# Patient Record
Sex: FEMALE | Race: WHITE | NOT HISPANIC OR LATINO | Employment: OTHER | URBAN - METROPOLITAN AREA
[De-identification: names, ages, dates, MRNs, and addresses within clinical notes are randomized per-mention and may not be internally consistent; named-entity substitution may affect disease eponyms.]

---

## 2017-01-03 ENCOUNTER — HOSPITAL ENCOUNTER (OUTPATIENT)
Dept: LAB | Facility: MEDICAL CENTER | Age: 82
End: 2017-01-03
Attending: FAMILY MEDICINE
Payer: MEDICARE

## 2017-01-03 DIAGNOSIS — E78.49 OTHER HYPERLIPIDEMIA: ICD-10-CM

## 2017-01-03 DIAGNOSIS — E03.8 SUBCLINICAL HYPOTHYROIDISM: ICD-10-CM

## 2017-01-03 LAB
CHOLEST SERPL-MCNC: 180 MG/DL (ref 100–199)
HDLC SERPL-MCNC: 66 MG/DL
LDLC SERPL CALC-MCNC: 99 MG/DL
T4 FREE SERPL-MCNC: 0.99 NG/DL (ref 0.53–1.43)
TRIGL SERPL-MCNC: 76 MG/DL (ref 0–149)
TSH SERPL DL<=0.005 MIU/L-ACNC: 4.8 UIU/ML (ref 0.3–3.7)

## 2017-01-03 PROCEDURE — 80061 LIPID PANEL: CPT

## 2017-01-03 PROCEDURE — 84439 ASSAY OF FREE THYROXINE: CPT

## 2017-01-03 PROCEDURE — 36415 COLL VENOUS BLD VENIPUNCTURE: CPT

## 2017-01-03 PROCEDURE — 84443 ASSAY THYROID STIM HORMONE: CPT

## 2017-01-05 ENCOUNTER — TELEPHONE (OUTPATIENT)
Dept: MEDICAL GROUP | Facility: PHYSICIAN GROUP | Age: 82
End: 2017-01-05

## 2017-01-05 NOTE — TELEPHONE ENCOUNTER
----- Message from Ayesha Andujar D.O. sent at 1/5/2017  1:01 PM PST -----  Please have patient schedule an appointment to go over results.  Thank you,  Dr. Andujar

## 2017-01-13 ENCOUNTER — OFFICE VISIT (OUTPATIENT)
Dept: MEDICAL GROUP | Facility: PHYSICIAN GROUP | Age: 82
End: 2017-01-13
Payer: MEDICARE

## 2017-01-13 VITALS
BODY MASS INDEX: 30.73 KG/M2 | RESPIRATION RATE: 16 BRPM | WEIGHT: 167 LBS | HEIGHT: 62 IN | TEMPERATURE: 97.5 F | HEART RATE: 82 BPM | SYSTOLIC BLOOD PRESSURE: 150 MMHG | OXYGEN SATURATION: 92 % | DIASTOLIC BLOOD PRESSURE: 68 MMHG

## 2017-01-13 DIAGNOSIS — E03.9 HYPOTHYROIDISM, UNSPECIFIED TYPE: ICD-10-CM

## 2017-01-13 PROCEDURE — 99213 OFFICE O/P EST LOW 20 MIN: CPT | Performed by: FAMILY MEDICINE

## 2017-01-13 RX ORDER — LEVOTHYROXINE SODIUM 0.03 MG/1
25 TABLET ORAL
Qty: 30 TAB | Refills: 1 | Status: SHIPPED | OUTPATIENT
Start: 2017-01-13 | End: 2017-03-02 | Stop reason: SDUPTHER

## 2017-01-13 NOTE — PROGRESS NOTES
Subjective:     Chief Complaint   Patient presents with   • Results       Corrinne Alice Carmignani is a 81 y.o. female here today for follow-up abnormal TSH and labs.  Patient reports fatigue that is nonspecific. Patient denies any heat intolerance, cold intolerance, weight changes, hair loss, other factors significant for thyroid disease. On review of patient's chart TSH has been elevated for one year. Patient reports history of hypothyroidism many years prior for which she was on thyroid medication. Pt is interested in treatment as she does have a symptom of fatigue.      No Known Allergies  Current medicines (including changes today)  Current Outpatient Prescriptions   Medication Sig Dispense Refill   • levothyroxine (SYNTHROID) 25 MCG Tab Take 1 Tab by mouth Every morning on an empty stomach. 30 Tab 1   • anastrozole (ARIMIDEX) 1 MG Tab RX by oncologist 30 Tab 0   • simvastatin (ZOCOR) 20 MG Tab TAKE 1 TABLET EVERY EVENINGPLEASE MAKE AN APPOINTMENT WITH YOUR DOCTOR 90 Tab 1   • vitamin D (CHOLECALCIFEROL) 1000 UNIT Tab Take 1,000 Units by mouth every evening.     • ascorbic acid (ASCORBIC ACID) 500 MG Tab Take 500 mg by mouth every day.     • Multiple Vitamin (MULTI-VITAMIN DAILY PO) Take  by mouth.     • Cyanocobalamin (VITAMIN B-12) 1000 MCG TABS Take 1,000 mcg by mouth every evening.       No current facility-administered medications for this visit.     Social History   Substance Use Topics   • Smoking status: Former Smoker -- 0.50 packs/day for 45 years     Types: Cigarettes     Quit date: 1998   • Smokeless tobacco: Never Used      Comment: avoid all tobacco products   • Alcohol Use: 0.0 oz/week     0 Standard drinks or equivalent per week      Comment: 7/year     Family Status   Relation Status Death Age   • Mother     • Father       Family History   Problem Relation Age of Onset   • Heart Disease Mother    • Other Father      She    has a past medical history of Hyperlipidemia; Back  "pain; Hypothyroid; Cancer (CMS-HCC); Lumbar spondylosis; Arthritis; Dental disorder; Snoring; and Cataract.        ROS   GEN: no weight loss, fevers, or chills  HEENT: no blurry vision or change in vision  Resp: no shortness of breath, no cough  CV: no racing heart, no irregular beats, no chest pain  Abd: no nausea, no vomiting, no diarrhea, no constipation, no blood in stool, no dark stools, no incontinence  MSK: no muscle aches, no joint pain, no limited motion         Objective:     Blood pressure 150/68, pulse 82, temperature 36.4 °C (97.5 °F), resp. rate 16, height 1.575 m (5' 2.01\"), weight 75.751 kg (167 lb), SpO2 92 %. Body mass index is 30.54 kg/(m^2).   Physical Exam:  Constitutional: Alert, no distress.  Skin: Warm, dry, good turgor, no rashes in visible areas.  Eye: Equal, round and reactive, conjunctiva clear, lids normal.  ENMT: Lips without lesions, good dentition, oropharynx non-erythematous, no exudate, moist oral mucosa  Neck: Trachea midline, no masses, no thyromegaly. No cervical or supraclavicular lymphadenopathy. Full ROM  Respiratory: Unlabored respiratory effort, good air movement, lungs clear to auscultation, no wheezes, no ronchi.  Cardiovascular:RRR, +S1, S2, no murmur, no peripheral edema, pedal and radial pulses equal and intact bilat  Abdomen: Soft, non-tender, no masses, no hepatosplenomegaly.  MSK:5/5 muscle strength in upper extremities as well as lower extremity bilaterally  Psych: Alert and oriented x3, appropriate affect and mood.  Neuro: CN2-12 intact, no gross motor or sensory deficits      Assessment and Plan:   The following treatment plan was discussed    1. Hypothyroidism, unspecified type  New problem: Risk and benefits of treatment extensively discussed with patient. Patient is interested in treatment at this time. Advised patient to take medicine each morning on empty stomach with a full glass of water one hour before food. Patient understands the need for compliance with " this medication. Advised patient to have recheck of labs in 6 weeks for resolution. Signs and symptoms of hyper and hypothyroidism discussed.  - levothyroxine (SYNTHROID) 25 MCG Tab; Take 1 Tab by mouth Every morning on an empty stomach.  Dispense: 30 Tab; Refill: 1  - TSH WITH REFLEX TO FT4; Future      Followup: No Follow-up on file.    Please note that this dictation was created using voice recognition software. I have made every reasonable attempt to correct obvious errors, but I expect that there are errors of grammar and possibly content that I did not discover before finalizing the note.

## 2017-01-13 NOTE — MR AVS SNAPSHOT
"        Corrinne Alice Carmignnaveed   2017 2:00 PM   Office Visit   MRN: [id number]    Department:  [facility]   Dept Phone:  [phone]    Description:  Female : 1935   Provider:  Ayesha Andujar D.O.           Reason for Visit     Results           Allergies as of 2017     No Known Allergies      You were diagnosed with     Hypothyroidism, unspecified type   [[id number]]         Vital Signs     Blood Pressure Pulse Temperature Respirations Height Weight    150/68 mmHg 82 36.4 °C (97.5 °F) 16 1.575 m (5' 2.01\") 75.751 kg (167 lb)    Body Mass Index Oxygen Saturation Smoking Status             30.54 kg/m2 92% Former Smoker         Basic Information     Date Of Birth Sex Race Ethnicity Preferred Language    1935 Female White Non- English      Problem List              ICD-10-CM Priority Class Noted - Resolved    Hyperlipidemia E78.5 Low  2010 - Present    Hx of Nonspecific abnormal electrocardiogram (ECG) (EKG) R94.31 Medium  6/3/2014 - Present    Malignant neoplasm of right breast (CMS-HCC) C50.911   2016 - Present    Vitamin D deficiency E55.9   2016 - Present    Subclinical hypothyroidism E03.9   2016 - Present      Health Maintenance        Date Due Completion Dates    IMM INFLUENZA (1) 2016 ---    MAMMOGRAM 2017, 2016, 2016, 2015, 2015, 2014, 2014, 2014, 2013, 2013, 2012, 2011, 2010, 2010, 3/23/2009, 3/23/2009, 2008, 2008    IMM PNEUMOCOCCAL 65+ (ADULT) LOW/MEDIUM RISK SERIES (2 of 2 - PPSV23) 2017    COLONOSCOPY 10/2/2018 10/2/2013    BONE DENSITY 2021, 2013    IMM DTaP/Tdap/Td Vaccine (2 - Td) 2023            Current Immunizations     13-VALENT PCV PREVNAR 2016    SHINGLES VACCINE 2016    Tdap Vaccine 2013  8:25 AM      Below and/or attached are the medications your provider expects you to take. Review all of " your home medications and newly ordered medications with your provider and/or pharmacist. Follow medication instructions as directed by your provider and/or pharmacist. Please keep your medication list with you and share with your provider. Update the information when medications are discontinued, doses are changed, or new medications (including over-the-counter products) are added; and carry medication information at all times in the event of emergency situations     Allergies:  No Known Allergies          Medications  Valid as of: January 13, 2017 -  3:57 PM    Generic Name Brand Name Tablet Size Instructions for use    Anastrozole (Tab) ARIMIDEX 1 MG RX by oncologist        Ascorbic Acid (Tab) ascorbic acid 500 MG Take 500 mg by mouth every day.        Cholecalciferol (Tab) cholecalciferol 1000 UNIT Take 1,000 Units by mouth every evening.        Cyanocobalamin (Tab) vitamin B-12 1000 MCG Take 1,000 mcg by mouth every evening.        Levothyroxine Sodium (Tab) SYNTHROID 25 MCG Take 1 Tab by mouth Every morning on an empty stomach.        Multiple Vitamin   Take  by mouth.        Simvastatin (Tab) ZOCOR 20 MG TAKE 1 TABLET EVERY EVENINGPLEASE MAKE AN APPOINTMENT WITH YOUR DOCTOR        .                 Medicines prescribed today were sent to:     [facility] - [place] - [address]    [address] [place] [address]    Phone: [phone] Fax: [phone]    Open 24 Hours?: No    [facility] [place] [place] - [address] AT PORTAL TO REGISTERED [facility] SITES    [address] [facility] [address]    Phone: [phone] Fax: [phone]    Open 24 Hours?: No      Medication refill instructions:       If your prescription bottle indicates you have medication refills left, it is not necessary to call your provider’s office. Please contact your pharmacy and they will refill your medication.    If your prescription bottle indicates you do not have any refills  left, you may request refills at any time through one of the following ways: The online [employer] system (except Urgent Care), by calling your provider’s office, or by asking your pharmacy to contact your provider’s office with a refill request. Medication refills are processed only during regular business hours and may not be available until the next business day. Your provider may request additional information or to have a follow-up visit with you prior to refilling your medication.   *Please Note: Medication refills are assigned a new Rx number when refilled electronically. Your pharmacy may indicate that no refills were authorized even though a new prescription for the same medication is available at the pharmacy. Please request the medicine by name with the pharmacy before contacting your provider for a refill.        Your To Do List     Future Labs/Procedures Complete By Expires    TSH WITH REFLEX TO FT4  As directed 1/13/2018         [employer] Access Code: Activation code not generated  Current [employer] Status: Active

## 2017-01-21 ENCOUNTER — OFFICE VISIT (OUTPATIENT)
Dept: URGENT CARE | Facility: PHYSICIAN GROUP | Age: 82
End: 2017-01-21
Payer: MEDICARE

## 2017-01-21 ENCOUNTER — HOSPITAL ENCOUNTER (OUTPATIENT)
Facility: MEDICAL CENTER | Age: 82
End: 2017-01-21
Attending: FAMILY MEDICINE
Payer: MEDICARE

## 2017-01-21 VITALS
BODY MASS INDEX: 31.21 KG/M2 | TEMPERATURE: 98.1 F | HEIGHT: 62 IN | RESPIRATION RATE: 16 BRPM | SYSTOLIC BLOOD PRESSURE: 138 MMHG | OXYGEN SATURATION: 94 % | HEART RATE: 92 BPM | WEIGHT: 169.6 LBS | DIASTOLIC BLOOD PRESSURE: 70 MMHG

## 2017-01-21 DIAGNOSIS — N39.0 ACUTE UTI: ICD-10-CM

## 2017-01-21 LAB
APPEARANCE UR: NORMAL
BILIRUB UR STRIP-MCNC: NORMAL MG/DL
COLOR UR AUTO: YELLOW
GLUCOSE UR STRIP.AUTO-MCNC: NORMAL MG/DL
KETONES UR STRIP.AUTO-MCNC: NORMAL MG/DL
LEUKOCYTE ESTERASE UR QL STRIP.AUTO: NORMAL
NITRITE UR QL STRIP.AUTO: NORMAL
PH UR STRIP.AUTO: 6 [PH] (ref 5–8)
PROT UR QL STRIP: NORMAL MG/DL
RBC UR QL AUTO: NORMAL
SP GR UR STRIP.AUTO: 1.02
UROBILINOGEN UR STRIP-MCNC: NORMAL MG/DL

## 2017-01-21 PROCEDURE — 87086 URINE CULTURE/COLONY COUNT: CPT

## 2017-01-21 PROCEDURE — 99214 OFFICE O/P EST MOD 30 MIN: CPT | Performed by: FAMILY MEDICINE

## 2017-01-21 PROCEDURE — 99000 SPECIMEN HANDLING OFFICE-LAB: CPT | Performed by: FAMILY MEDICINE

## 2017-01-21 PROCEDURE — 81002 URINALYSIS NONAUTO W/O SCOPE: CPT | Performed by: FAMILY MEDICINE

## 2017-01-21 RX ORDER — CEFUROXIME AXETIL 250 MG/1
250 TABLET ORAL 2 TIMES DAILY
Qty: 14 TAB | Refills: 0 | Status: SHIPPED | OUTPATIENT
Start: 2017-01-21 | End: 2017-01-28

## 2017-01-21 ASSESSMENT — ENCOUNTER SYMPTOMS
MYALGIAS: 0
EYE DISCHARGE: 0
WEIGHT LOSS: 0
HEADACHES: 0
EYE REDNESS: 0

## 2017-01-21 NOTE — MR AVS SNAPSHOT
"        Corrinne Alice Carmignani   2017 12:15 PM   Office Visit   MRN: [id number]    Department:  [facility]   Dept Phone:  [phone]    Description:  Female : 1935   Provider:  Chavez Carlton M.D.           Reason for Visit     UTI discomfort while urinating / frequent urination       Allergies as of 2017     No Known Allergies      You were diagnosed with     Acute UTI   [[id number]]         Vital Signs     Blood Pressure Pulse Temperature Respirations Height Weight    138/70 mmHg 92 36.7 °C (98.1 °F) 16 1.575 m (5' 2.01\") 76.93 kg (169 lb 9.6 oz)    Body Mass Index Oxygen Saturation Smoking Status             31.01 kg/m2 94% Former Smoker         Basic Information     Date Of Birth Sex Race Ethnicity Preferred Language    1935 Female White Non- English      Problem List              ICD-10-CM Priority Class Noted - Resolved    Hyperlipidemia E78.5 Low  2010 - Present    Hx of Nonspecific abnormal electrocardiogram (ECG) (EKG) R94.31 Medium  6/3/2014 - Present    Malignant neoplasm of right breast (CMS-HCC) C50.911   2016 - Present    Vitamin D deficiency E55.9   2016 - Present    Subclinical hypothyroidism E03.9   2016 - Present      Health Maintenance        Date Due Completion Dates    IMM INFLUENZA (1) 2016 ---    MAMMOGRAM 2017, 2015, 2015, 2014, 2014, 2014, 2013, 2013, 2012, 2011, 2010, 2010, 3/23/2009, 3/23/2009, 2008, 2008    IMM PNEUMOCOCCAL 65+ (ADULT) LOW/MEDIUM RISK SERIES (2 of 2 - PPSV23) 2017    COLONOSCOPY 10/2/2018 10/2/2013    BONE DENSITY 2021, 2013    IMM DTaP/Tdap/Td Vaccine (2 - Td) 2023            Current Immunizations     13-VALENT PCV PREVNAR 2016    SHINGLES VACCINE 2016    Tdap Vaccine 2013  8:25 AM      Below and/or attached are the medications your provider expects you to take. Review all of " your home medications and newly ordered medications with your provider and/or pharmacist. Follow medication instructions as directed by your provider and/or pharmacist. Please keep your medication list with you and share with your provider. Update the information when medications are discontinued, doses are changed, or new medications (including over-the-counter products) are added; and carry medication information at all times in the event of emergency situations     Allergies:  No Known Allergies          Medications  Valid as of: January 21, 2017 - 12:36 PM    Generic Name Brand Name Tablet Size Instructions for use    Anastrozole (Tab) ARIMIDEX 1 MG RX by oncologist        Ascorbic Acid (Tab) ascorbic acid 500 MG Take 500 mg by mouth every day.        Cefuroxime Axetil (Tab) CEFTIN 250 MG Take 1 Tab by mouth 2 times a day for 7 days.        Cholecalciferol (Tab) cholecalciferol 1000 UNIT Take 1,000 Units by mouth every evening.        Cyanocobalamin (Tab) vitamin B-12 1000 MCG Take 1,000 mcg by mouth every evening.        Levothyroxine Sodium (Tab) SYNTHROID 25 MCG Take 1 Tab by mouth Every morning on an empty stomach.        Multiple Vitamin   Take  by mouth.        Simvastatin (Tab) ZOCOR 20 MG TAKE 1 TABLET EVERY EVENINGPLEASE MAKE AN APPOINTMENT WITH YOUR DOCTOR        .                 Medicines prescribed today were sent to:     [facility] - [place] - [address] [facility]    [address] [facility] [place] [address]    Phone: [phone] Fax: [phone]    Open 24 Hours?: No    [facility] [place], [place] - [address] AT PORTAL TO [facility] SITES    [address] [facility] [address]    Phone: [phone] Fax: [phone]    Open 24 Hours?: No      Medication refill instructions:       If your prescription bottle indicates you have medication refills left, it is not necessary to call your provider’s office. Please contact your pharmacy and they will  refill your medication.    If your prescription bottle indicates you do not have any refills left, you may request refills at any time through one of the following ways: The online [employer] system (except Urgent Care), by calling your provider’s office, or by asking your pharmacy to contact your provider’s office with a refill request. Medication refills are processed only during regular business hours and may not be available until the next business day. Your provider may request additional information or to have a follow-up visit with you prior to refilling your medication.   *Please Note: Medication refills are assigned a new Rx number when refilled electronically. Your pharmacy may indicate that no refills were authorized even though a new prescription for the same medication is available at the pharmacy. Please request the medicine by name with the pharmacy before contacting your provider for a refill.        Your To Do List     Future Labs/Procedures Complete By Expires    URINE CULTURE(NEW)  As directed 1/21/2018         [employer] Access Code: Activation code not generated  Current [employer] Status: Active

## 2017-01-21 NOTE — PROGRESS NOTES
"Subjective:      Corrinne Alice Carmignani is a 81 y.o. female who presents with UTI            UTI  This is a new problem. Episode onset: 1 month intermittent dysuria. Worse last night. Mild urgency and frequency. No hematuria. No fever No flank pain. No PMH pyelonephritis. No OTC medications.  Pertinent negatives include no headaches, myalgias or rash.   No other aggravating or alleviating factors.      Review of Systems   Constitutional: Positive for malaise/fatigue. Negative for weight loss.   Eyes: Negative for discharge and redness.   Musculoskeletal: Negative for myalgias and joint pain.   Skin: Negative for itching and rash.   Neurological: Negative for headaches.          Objective:     /70 mmHg  Pulse 92  Temp(Src) 36.7 °C (98.1 °F)  Resp 16  Ht 1.575 m (5' 2.01\")  Wt 76.93 kg (169 lb 9.6 oz)  BMI 31.01 kg/m2  SpO2 94%     Physical Exam   Constitutional: She appears well-developed and well-nourished. No distress.   Eyes: Conjunctivae are normal.   Genitourinary:   No cvat, no suprapubic tenderness   Neurological:   Speech is clear. Patient is appropriate and cooperative.     Skin: Skin is warm and dry. No rash noted.               Assessment/Plan:     There are no diagnoses linked to this encounter.      "

## 2017-01-22 DIAGNOSIS — N39.0 ACUTE UTI: ICD-10-CM

## 2017-01-24 LAB
BACTERIA UR CULT: NORMAL
SIGNIFICANT IND 70042: NORMAL
SOURCE SOURCE: NORMAL

## 2017-02-21 RX ORDER — SIMVASTATIN 20 MG
TABLET ORAL
Qty: 90 TAB | Refills: 0 | Status: SHIPPED | OUTPATIENT
Start: 2017-02-21 | End: 2017-04-30 | Stop reason: SDUPTHER

## 2017-02-21 NOTE — TELEPHONE ENCOUNTER
*LAST Rx SIG: REQUESTING FOR DR APPOINTMENT 08/12/16*  Was the patient seen in the last year in this department? Yes     Does patient have an active prescription for medications requested? No     Received Request Via: Pharmacy      Pt met protocol?: Yes    LAST OV 01/13/17    LAST LIPID PROFILE 01/03/17    BP Readings from Last 1 Encounters:   01/21/17 138/70

## 2017-02-21 NOTE — TELEPHONE ENCOUNTER
Last seen by PCP 1/17. Will send 3 months to pharmacy.  Lab Results   Component Value Date/Time    CHOLESTEROL, 01/03/2017 12:02 PM    LDL 99 01/03/2017 12:02 PM    HDL 66 01/03/2017 12:02 PM    TRIGLYCERIDES 76 01/03/2017 12:02 PM       Lab Results   Component Value Date/Time    SODIUM 140 04/29/2016 09:21 AM    POTASSIUM 4.0 04/29/2016 09:21 AM    CHLORIDE 102 04/29/2016 09:21 AM    CO2 31 04/29/2016 09:21 AM    GLUCOSE 96 04/29/2016 09:21 AM    BUN 16 04/29/2016 09:21 AM    CREATININE 0.64 04/29/2016 09:21 AM     Lab Results   Component Value Date/Time    ALKALINE PHOSPHATASE 69 04/29/2016 09:21 AM    AST(SGOT) 17 04/29/2016 09:21 AM    ALT(SGPT) 15 04/29/2016 09:21 AM    TOTAL BILIRUBIN 0.8 04/29/2016 09:21 AM

## 2017-02-24 ENCOUNTER — HOSPITAL ENCOUNTER (OUTPATIENT)
Dept: LAB | Facility: MEDICAL CENTER | Age: 82
End: 2017-02-24
Attending: FAMILY MEDICINE
Payer: MEDICARE

## 2017-02-24 DIAGNOSIS — E03.9 HYPOTHYROIDISM, UNSPECIFIED TYPE: ICD-10-CM

## 2017-02-24 LAB — TSH SERPL DL<=0.005 MIU/L-ACNC: 3.67 UIU/ML (ref 0.3–3.7)

## 2017-02-24 PROCEDURE — 84443 ASSAY THYROID STIM HORMONE: CPT

## 2017-02-24 PROCEDURE — 36415 COLL VENOUS BLD VENIPUNCTURE: CPT

## 2017-03-02 DIAGNOSIS — E03.9 HYPOTHYROIDISM, UNSPECIFIED TYPE: ICD-10-CM

## 2017-03-02 RX ORDER — LEVOTHYROXINE SODIUM 0.03 MG/1
25 TABLET ORAL
Qty: 30 TAB | Refills: 10 | Status: SHIPPED | OUTPATIENT
Start: 2017-03-02 | End: 2017-12-27 | Stop reason: SDUPTHER

## 2017-03-02 NOTE — TELEPHONE ENCOUNTER
Was the patient seen in the last year in this department? Yes     Does patient have an active prescription for medications requested? No     Received Request Via: Patient     Pt states she was suppose to do another blood test to see her TSH levels to see if her medication needs to be adjusted.  Her test results are in and now needs a refill.  She would like to know if she could get a 90 day supply?

## 2017-05-01 RX ORDER — SIMVASTATIN 20 MG
TABLET ORAL
Qty: 90 TAB | Refills: 1 | Status: SHIPPED | OUTPATIENT
Start: 2017-05-01 | End: 2017-06-09 | Stop reason: SDUPTHER

## 2017-05-01 NOTE — TELEPHONE ENCOUNTER
Pt has had OV within the 12 month protocol and lipid panel is current from 1/17. 6 month supply sent to pharmacy.   Lab Results   Component Value Date/Time    CHOLESTEROL, 01/03/2017 12:02 PM    LDL 99 01/03/2017 12:02 PM    HDL 66 01/03/2017 12:02 PM    TRIGLYCERIDES 76 01/03/2017 12:02 PM       Lab Results   Component Value Date/Time    SODIUM 140 04/29/2016 09:21 AM    POTASSIUM 4.0 04/29/2016 09:21 AM    CHLORIDE 102 04/29/2016 09:21 AM    CO2 31 04/29/2016 09:21 AM    GLUCOSE 96 04/29/2016 09:21 AM    BUN 16 04/29/2016 09:21 AM    CREATININE 0.64 04/29/2016 09:21 AM     Lab Results   Component Value Date/Time    ALKALINE PHOSPHATASE 69 04/29/2016 09:21 AM    AST(SGOT) 17 04/29/2016 09:21 AM    ALT(SGPT) 15 04/29/2016 09:21 AM    TOTAL BILIRUBIN 0.8 04/29/2016 09:21 AM

## 2017-05-29 ENCOUNTER — OFFICE VISIT (OUTPATIENT)
Dept: URGENT CARE | Facility: PHYSICIAN GROUP | Age: 82
End: 2017-05-29
Payer: MEDICARE

## 2017-05-29 VITALS
HEIGHT: 61 IN | SYSTOLIC BLOOD PRESSURE: 126 MMHG | RESPIRATION RATE: 20 BRPM | OXYGEN SATURATION: 94 % | DIASTOLIC BLOOD PRESSURE: 62 MMHG | TEMPERATURE: 99.7 F | BODY MASS INDEX: 30.96 KG/M2 | HEART RATE: 80 BPM | WEIGHT: 164 LBS

## 2017-05-29 DIAGNOSIS — J06.9 VIRAL URI WITH COUGH: ICD-10-CM

## 2017-05-29 LAB
INT CON NEG: NEGATIVE
INT CON POS: POSITIVE
S PYO AG THROAT QL: NEGATIVE

## 2017-05-29 PROCEDURE — 99213 OFFICE O/P EST LOW 20 MIN: CPT | Performed by: PHYSICIAN ASSISTANT

## 2017-05-29 PROCEDURE — G8419 CALC BMI OUT NRM PARAM NOF/U: HCPCS | Performed by: PHYSICIAN ASSISTANT

## 2017-05-29 PROCEDURE — G8432 DEP SCR NOT DOC, RNG: HCPCS | Performed by: PHYSICIAN ASSISTANT

## 2017-05-29 PROCEDURE — 4040F PNEUMOC VAC/ADMIN/RCVD: CPT | Performed by: PHYSICIAN ASSISTANT

## 2017-05-29 PROCEDURE — 1101F PT FALLS ASSESS-DOCD LE1/YR: CPT | Performed by: PHYSICIAN ASSISTANT

## 2017-05-29 PROCEDURE — 87880 STREP A ASSAY W/OPTIC: CPT | Performed by: PHYSICIAN ASSISTANT

## 2017-05-29 PROCEDURE — 1036F TOBACCO NON-USER: CPT | Performed by: PHYSICIAN ASSISTANT

## 2017-05-29 RX ORDER — ASPIRIN 81 MG/1
81 TABLET, CHEWABLE ORAL DAILY
COMMUNITY

## 2017-05-29 RX ORDER — FLUTICASONE PROPIONATE 50 MCG
1 SPRAY, SUSPENSION (ML) NASAL 2 TIMES DAILY
Qty: 16 G | Refills: 0 | Status: SHIPPED | OUTPATIENT
Start: 2017-05-29 | End: 2019-05-30

## 2017-05-29 NOTE — MR AVS SNAPSHOT
"        Corrinne Alice Carmignnaveed   2017 9:20 AM   Office Visit   MRN: [id number]    Department:  [facility]   Dept Phone:  [phone]    Description:  Female : 1935   Provider:  Palak Lagos PA-C           Reason for Visit     Sore Throat C/o sore throat, runny nose x2 days.  Took an old penicillin pill from previous encounter.      Allergies as of 2017     No Known Allergies      You were diagnosed with     Viral URI with cough   [[id number]]         Vital Signs     Blood Pressure Pulse Temperature Respirations Height Weight    126/62 mmHg 80 37.6 °C (99.7 °F) 20 1.562 m (5' 1.5\") 74.39 kg (164 lb)    Body Mass Index Oxygen Saturation Smoking Status             30.49 kg/m2 94% Former Smoker         Basic Information     Date Of Birth Sex Race Ethnicity Preferred Language    1935 Female White Non- English      Your appointments     2017  8:00 AM   MA [id number] with [facility] MG 3   [facility] [place] ([address])    [address] [place] [id number]   [phone]           No deodorant, powder, perfume or lotion under the arm or breast area.              Problem List              ICD-10-CM Priority Class Noted - Resolved    Hyperlipidemia E78.5 Low  2010 - Present    Hx of Nonspecific abnormal electrocardiogram (ECG) (EKG) R94.31 Medium  6/3/2014 - Present    Malignant neoplasm of right breast (CMS-HCC) C50.911   2016 - Present    Vitamin D deficiency E55.9   2016 - Present    Subclinical hypothyroidism E03.9   2016 - Present      Health Maintenance        Date Due Completion Dates    MAMMOGRAM 2017, 2015, 2015, 2014, 2014, 2014, 2013, 2013, 2012, 2011, 2010, 2010, 3/23/2009, 3/23/2009, 2008, 2008    IMM PNEUMOCOCCAL 65+ (ADULT) LOW/MEDIUM RISK SERIES (2 of 2 - PPSV23) 2017    COLONOSCOPY 10/2/2018 10/2/2013    BONE DENSITY " 4/12/2021 4/12/2016, 7/11/2013    IMM DTaP/Tdap/Td Vaccine (2 - Td) 7/9/2023 7/9/2013            Results     POCT Rapid Strep A      Component    Rapid Strep Screen    Negative    Internal Control Positive    Positive    Internal Control Negative    Negative                        Current Immunizations     13-VALENT PCV PREVNAR 9/8/2016    SHINGLES VACCINE 9/8/2016    Tdap Vaccine 7/9/2013  8:25 AM      Below and/or attached are the medications your provider expects you to take. Review all of your home medications and newly ordered medications with your provider and/or pharmacist. Follow medication instructions as directed by your provider and/or pharmacist. Please keep your medication list with you and share with your provider. Update the information when medications are discontinued, doses are changed, or new medications (including over-the-counter products) are added; and carry medication information at all times in the event of emergency situations     Allergies:  No Known Allergies          Medications  Valid as of: May 29, 2017 - 10:19 AM    Generic Name Brand Name Tablet Size Instructions for use    Anastrozole (Tab) ARIMIDEX 1 MG RX by oncologist        Ascorbic Acid (Tab) ascorbic acid 500 MG Take 500 mg by mouth every day.        Aspirin (Chew Tab) ASA 81 MG Take 81 mg by mouth every day.        Cholecalciferol (Tab) cholecalciferol 1000 UNIT Take 1,000 Units by mouth every evening.        Cholecalciferol   Take  by mouth.        Cyanocobalamin (Tab) vitamin B-12 1000 MCG Take 1,000 mcg by mouth every evening.        Fluticasone Propionate (Suspension) FLONASE 50 MCG/ACT Spray 1 Spray in nose 2 times a day.        Levothyroxine Sodium (Tab) SYNTHROID 25 MCG Take 1 Tab by mouth Every morning on an empty stomach.        Multiple Vitamin   Take  by mouth.        Simvastatin (Tab) ZOCOR 20 MG TAKE 1 TABLET EVERY  EVENING.        Sodium Fluoride (Paste) Sodium Fluoride 1.1 % by dental route.        .                  Medicines prescribed today were sent to:     [facility] - [place], [place] - [address] [facility]. [address]    [address] [facility]. [address] [place] [address]    Phone: [phone] Fax: [phone]    Open 24 Hours?: No      Medication refill instructions:       If your prescription bottle indicates you have medication refills left, it is not necessary to call your provider’s office. Please contact your pharmacy and they will refill your medication.    If your prescription bottle indicates you do not have any refills left, you may request refills at any time through one of the following ways: The online [email or web address] system (except Urgent Care), by calling your provider’s office, or by asking your pharmacy to contact your provider’s office with a refill request. Medication refills are processed only during regular business hours and may not be available until the next business day. Your provider may request additional information or to have a follow-up visit with you prior to refilling your medication.   *Please Note: Medication refills are assigned a new Rx number when refilled electronically. Your pharmacy may indicate that no refills were authorized even though a new prescription for the same medication is available at the pharmacy. Please request the medicine by name with the pharmacy before contacting your provider for a refill.           [email or web address] ([email or web address] Access Code: Activation code not generated  Current [email or web address] Status: Active

## 2017-05-29 NOTE — PROGRESS NOTES
Chief Complaint   Patient presents with   • Sore Throat     C/o sore throat, runny nose x2 days.  Took an old penicillin pill from previous encounter.       HISTORY OF PRESENT ILLNESS: Patient is a 81 y.o. female who presents today for 3 days of feeling unwell. She started feeling dry and aching throat and got worse every day.  She states her nose started running as well.  She has had slight headache but no temperature at home. She is coughing a bit in the night and today and feels due to the drainage. She took 1 old PCN she had leftover yesterday but it did not help. No other interventions.      Patient Active Problem List    Diagnosis Date Noted   • Hx of Nonspecific abnormal electrocardiogram (ECG) (EKG) 06/03/2014     Priority: Medium   • Hyperlipidemia 02/22/2010     Priority: Low   • Subclinical hypothyroidism 02/23/2016   • Vitamin D deficiency 02/18/2016   • Malignant neoplasm of right breast (CMS-HCC) 01/26/2016       Allergies:Review of patient's allergies indicates no known allergies.    Current Outpatient Prescriptions Ordered in [facility]   Medication Sig Dispense Refill   • simvastatin (ZOCOR) 20 MG Tab TAKE 1 TABLET EVERY  EVENING. 90 Tab 1   • levothyroxine (SYNTHROID) 25 MCG Tab Take 1 Tab by mouth Every morning on an empty stomach. 30 Tab 10   • anastrozole (ARIMIDEX) 1 MG Tab RX by oncologist 30 Tab 0   • vitamin D (CHOLECALCIFEROL) 1000 UNIT Tab Take 1,000 Units by mouth every evening.     • ascorbic acid (ASCORBIC ACID) 500 MG Tab Take 500 mg by mouth every day.     • Multiple Vitamin (MULTI-VITAMIN DAILY PO) Take  by mouth.     • Cyanocobalamin (VITAMIN B-12) 1000 MCG TABS Take 1,000 mcg by mouth every evening.       No current Epic-ordered facility-administered medications on file.       Past Medical History   Diagnosis Date   • Hyperlipidemia    • Back pain    • Hypothyroid    • Cancer (CMS-HCC)    • Lumbar spondylosis      with intermittent radiculopathy   • Arthritis      shoulder   • Dental  "disorder      lower partial   • Snoring      sleep study done, negative, years ago   • Cataract      genna IOLI       Social History   Substance Use Topics   • Smoking status: Former Smoker -- 0.50 packs/day for 45 years     Types: Cigarettes     Quit date: 1998   • Smokeless tobacco: Never Used      Comment: avoid all tobacco products   • Alcohol Use: 0.0 oz/week     0 Standard drinks or equivalent per week      Comment: 7/year       Family Status   Relation Status Death Age   • Mother     • Father       Family History   Problem Relation Age of Onset   • Heart Disease Mother    • Other Father        ROS:  Review of Systems   Constitutional: SEE HPI  HENT: SEE HPI  Eyes: Negative for blurred vision.   Respiratory: SEE HPI  Cardiovascular: Negative for chest pain, palpitations, orthopnea and leg swelling.   Gastrointestinal: Negative for heartburn, nausea, vomiting and abdominal pain.   All other systems reviewed and are negative.       Exam:  Blood pressure 126/62, pulse 80, temperature 37.6 °C (99.7 °F), resp. rate 20, height 1.562 m (5' 1.5\"), weight 74.39 kg (164 lb), SpO2 94 %.  General:  Well nourished, well developed female in NAD  Eyes: PERRLA, EOM within normal limits, no conjunctival injection, no scleral icterus, visual fields and acuity grossly intact.  Ears: Normal shape and symmetry, no tenderness, no discharge. External canals are without any significant edema or erythema. Tympanic membranes are without any inflammation, no effusion. Gross auditory acuity is intact  Nose: Symmetrical, sinuses without tenderness, clear rhinorrhea.   Mouth: reasonable hygiene, no erythema exudates or tonsillar enlargement.  Neck: no masses, range of motion within normal limits, no tracheal deviation. No lymphadenopathy  Pulmonary: Normal respiratory effort, no wheezes, crackles, or rhonchi.  Cardiovascular: regular rate and rhythm without murmurs, rubs, or gallops.  Skin: No visible rashes or lesion. " Warm, pink, dry.    Extremities: no clubbing, cyanosis, or edema.  Neuro: A&O x 3. Speech normal/clear.  Normal gait.         Assessment/Plan:  1. Viral URI with cough  fluticasone (FLONASE ALLERGY RELIEF) 50 MCG/ACT nasal spray    POCT Rapid Strep A       -lungs CTA today   -discussed that I felt this was viral in nature. Did not see any evidence of a bacterial process. Discussed natural progression and sx care.  -fluids, rest emphasized.  Flonase/Allegra or similar for post nasal drainage trial.   -humidifier/steam inhalations.    -salt gargles.    -RTC precautions.     Supportive care, differential diagnoses, and indications for immediate follow-up discussed with patient.   Pathogenesis of diagnosis discussed including typical length and natural progression.   Instructed to return to clinic or nearest emergency department for any change in condition, further concerns, or worsening of symptoms.  Patient states understanding of the plan of care and discharge instructions.      Palak Lagos PA-C

## 2017-06-09 DIAGNOSIS — E78.2 MIXED HYPERLIPIDEMIA: ICD-10-CM

## 2017-06-09 RX ORDER — SIMVASTATIN 20 MG
TABLET ORAL
Qty: 90 TAB | Refills: 4 | Status: SHIPPED | OUTPATIENT
Start: 2017-06-09 | End: 2018-01-04 | Stop reason: SDUPTHER

## 2017-06-20 ENCOUNTER — HOSPITAL ENCOUNTER (OUTPATIENT)
Dept: RADIOLOGY | Facility: MEDICAL CENTER | Age: 82
End: 2017-06-20
Attending: INTERNAL MEDICINE
Payer: MEDICARE

## 2017-06-20 DIAGNOSIS — Z12.31 VISIT FOR SCREENING MAMMOGRAM: ICD-10-CM

## 2017-06-20 PROCEDURE — 77063 BREAST TOMOSYNTHESIS BI: CPT

## 2017-06-21 ENCOUNTER — HOSPITAL ENCOUNTER (OUTPATIENT)
Dept: LAB | Facility: MEDICAL CENTER | Age: 82
End: 2017-06-21
Attending: INTERNAL MEDICINE
Payer: MEDICARE

## 2017-06-21 LAB
25(OH)D3 SERPL-MCNC: 35 NG/ML (ref 30–100)
ALBUMIN SERPL BCP-MCNC: 3.7 G/DL (ref 3.2–4.9)
ALBUMIN/GLOB SERPL: 1.4 G/DL
ALP SERPL-CCNC: 71 U/L (ref 30–99)
ALT SERPL-CCNC: 12 U/L (ref 2–50)
ANION GAP SERPL CALC-SCNC: 6 MMOL/L (ref 0–11.9)
AST SERPL-CCNC: 17 U/L (ref 12–45)
BILIRUB SERPL-MCNC: 0.8 MG/DL (ref 0.1–1.5)
BUN SERPL-MCNC: 15 MG/DL (ref 8–22)
CALCIUM SERPL-MCNC: 9.4 MG/DL (ref 8.5–10.5)
CHLORIDE SERPL-SCNC: 103 MMOL/L (ref 96–112)
CO2 SERPL-SCNC: 30 MMOL/L (ref 20–33)
CREAT SERPL-MCNC: 0.63 MG/DL (ref 0.5–1.4)
GFR SERPL CREATININE-BSD FRML MDRD: >60 ML/MIN/1.73 M 2
GLOBULIN SER CALC-MCNC: 2.7 G/DL (ref 1.9–3.5)
GLUCOSE SERPL-MCNC: 96 MG/DL (ref 65–99)
POTASSIUM SERPL-SCNC: 4 MMOL/L (ref 3.6–5.5)
PROT SERPL-MCNC: 6.4 G/DL (ref 6–8.2)
SODIUM SERPL-SCNC: 139 MMOL/L (ref 135–145)

## 2017-06-21 PROCEDURE — 80053 COMPREHEN METABOLIC PANEL: CPT

## 2017-06-21 PROCEDURE — 36415 COLL VENOUS BLD VENIPUNCTURE: CPT

## 2017-06-21 PROCEDURE — 82306 VITAMIN D 25 HYDROXY: CPT

## 2017-10-20 ENCOUNTER — HOSPITAL ENCOUNTER (OUTPATIENT)
Dept: LAB | Facility: MEDICAL CENTER | Age: 82
End: 2017-10-20
Attending: INTERNAL MEDICINE
Payer: MEDICARE

## 2017-10-20 LAB
25(OH)D3 SERPL-MCNC: 34 NG/ML (ref 30–100)
ALBUMIN SERPL BCP-MCNC: 3.8 G/DL (ref 3.2–4.9)
ALBUMIN/GLOB SERPL: 1.4 G/DL
ALP SERPL-CCNC: 74 U/L (ref 30–99)
ALT SERPL-CCNC: 11 U/L (ref 2–50)
ANION GAP SERPL CALC-SCNC: 5 MMOL/L (ref 0–11.9)
AST SERPL-CCNC: 18 U/L (ref 12–45)
BASOPHILS # BLD AUTO: 1.2 % (ref 0–1.8)
BASOPHILS # BLD: 0.08 K/UL (ref 0–0.12)
BILIRUB SERPL-MCNC: 0.8 MG/DL (ref 0.1–1.5)
BUN SERPL-MCNC: 14 MG/DL (ref 8–22)
CALCIUM SERPL-MCNC: 9.5 MG/DL (ref 8.5–10.5)
CHLORIDE SERPL-SCNC: 103 MMOL/L (ref 96–112)
CO2 SERPL-SCNC: 33 MMOL/L (ref 20–33)
CREAT SERPL-MCNC: 0.59 MG/DL (ref 0.5–1.4)
EOSINOPHIL # BLD AUTO: 0.13 K/UL (ref 0–0.51)
EOSINOPHIL NFR BLD: 1.9 % (ref 0–6.9)
ERYTHROCYTE [DISTWIDTH] IN BLOOD BY AUTOMATED COUNT: 44.9 FL (ref 35.9–50)
GFR SERPL CREATININE-BSD FRML MDRD: >60 ML/MIN/1.73 M 2
GLOBULIN SER CALC-MCNC: 2.7 G/DL (ref 1.9–3.5)
GLUCOSE SERPL-MCNC: 137 MG/DL (ref 65–99)
HCT VFR BLD AUTO: 46 % (ref 37–47)
HGB BLD-MCNC: 14.5 G/DL (ref 12–16)
IMM GRANULOCYTES # BLD AUTO: 0.01 K/UL (ref 0–0.11)
IMM GRANULOCYTES NFR BLD AUTO: 0.1 % (ref 0–0.9)
LYMPHOCYTES # BLD AUTO: 1.39 K/UL (ref 1–4.8)
LYMPHOCYTES NFR BLD: 20.3 % (ref 22–41)
MCH RBC QN AUTO: 29.8 PG (ref 27–33)
MCHC RBC AUTO-ENTMCNC: 31.5 G/DL (ref 33.6–35)
MCV RBC AUTO: 94.7 FL (ref 81.4–97.8)
MONOCYTES # BLD AUTO: 0.54 K/UL (ref 0–0.85)
MONOCYTES NFR BLD AUTO: 7.9 % (ref 0–13.4)
NEUTROPHILS # BLD AUTO: 4.71 K/UL (ref 2–7.15)
NEUTROPHILS NFR BLD: 68.6 % (ref 44–72)
NRBC # BLD AUTO: 0 K/UL
NRBC BLD AUTO-RTO: 0 /100 WBC
PLATELET # BLD AUTO: 232 K/UL (ref 164–446)
PMV BLD AUTO: 11.6 FL (ref 9–12.9)
POTASSIUM SERPL-SCNC: 3.9 MMOL/L (ref 3.6–5.5)
PROT SERPL-MCNC: 6.5 G/DL (ref 6–8.2)
RBC # BLD AUTO: 4.86 M/UL (ref 4.2–5.4)
SODIUM SERPL-SCNC: 141 MMOL/L (ref 135–145)
WBC # BLD AUTO: 6.9 K/UL (ref 4.8–10.8)

## 2017-10-20 PROCEDURE — 85025 COMPLETE CBC W/AUTO DIFF WBC: CPT

## 2017-10-20 PROCEDURE — 36415 COLL VENOUS BLD VENIPUNCTURE: CPT

## 2017-10-20 PROCEDURE — 82306 VITAMIN D 25 HYDROXY: CPT

## 2017-10-20 PROCEDURE — 80053 COMPREHEN METABOLIC PANEL: CPT

## 2017-12-27 DIAGNOSIS — E03.9 HYPOTHYROIDISM, UNSPECIFIED TYPE: ICD-10-CM

## 2017-12-28 RX ORDER — LEVOTHYROXINE SODIUM 0.03 MG/1
25 TABLET ORAL
Qty: 90 TAB | Refills: 0 | Status: SHIPPED | OUTPATIENT
Start: 2017-12-28 | End: 2018-03-09 | Stop reason: SDUPTHER

## 2018-01-04 DIAGNOSIS — E78.2 MIXED HYPERLIPIDEMIA: ICD-10-CM

## 2018-01-05 RX ORDER — SIMVASTATIN 20 MG
TABLET ORAL
Qty: 90 TAB | Refills: 1 | Status: SHIPPED | OUTPATIENT
Start: 2018-01-05 | End: 2018-03-09 | Stop reason: SDUPTHER

## 2018-01-05 NOTE — TELEPHONE ENCOUNTER
Pt has had OV within the 12 month protocol and lipid panel is current. 6 month supply sent to pharmacy.   Lab Results   Component Value Date/Time    CHOLSTRLTOT 180 01/03/2017 12:02 PM    LDL 99 01/03/2017 12:02 PM    HDL 66 01/03/2017 12:02 PM    TRIGLYCERIDE 76 01/03/2017 12:02 PM       Lab Results   Component Value Date/Time    SODIUM 141 10/20/2017 03:07 PM    POTASSIUM 3.9 10/20/2017 03:07 PM    CHLORIDE 103 10/20/2017 03:07 PM    CO2 33 10/20/2017 03:07 PM    GLUCOSE 137 (H) 10/20/2017 03:07 PM    BUN 14 10/20/2017 03:07 PM    CREATININE 0.59 10/20/2017 03:07 PM     Lab Results   Component Value Date/Time    ALKPHOSPHAT 74 10/20/2017 03:07 PM    ASTSGOT 18 10/20/2017 03:07 PM    ALTSGPT 11 10/20/2017 03:07 PM    TBILIRUBIN 0.8 10/20/2017 03:07 PM

## 2018-03-08 ENCOUNTER — TELEPHONE (OUTPATIENT)
Dept: MEDICAL GROUP | Facility: PHYSICIAN GROUP | Age: 83
End: 2018-03-08

## 2018-03-08 NOTE — TELEPHONE ENCOUNTER
Future Appointments       Provider Department Center    3/9/2018 8:20 AM Carrie Albrecht M.D. [facility]        ESTABLISHED PATIENT PRE-VISIT PLANNING     Note: Patient will not be contacted if there is no indication to call.     1.  Reviewed notes from the last few office visits within the medical group: Yes 01/13/2017    2.  If any orders were placed at last visit or intended to be done for this visit (i.e. 6 mos follow-up), do we have Results/Consult Notes?        •  Labs - Labs ordered, completed on 10/20/2017 and results are in chart.       •  Imaging - Imaging ordered, completed and results are in chart.       •  Referrals - No referrals were ordered at last office visit.    3. Is this appointment scheduled as a Hospital Follow-Up? No    4.  Immunizations were updated in Epic using WebIZ?: Yes       •  Web Iz Recommendations: MMR , PNEUMOVAX (PPSV23) and TD    5.  Patient is due for the following Health Maintenance Topics:   Health Maintenance Due   Topic Date Due   • Annual Wellness Visit  09/03/2016   • IMM PNEUMOCOCCAL (2 of 2 - PPSV23) 09/08/2017     6.  MDX printed and highlighted for Provider? N\A Ins MCR     7.  Patient was informed to arrive 15 min prior to their scheduled appointment and bring in their medication bottles.

## 2018-03-09 ENCOUNTER — OFFICE VISIT (OUTPATIENT)
Dept: MEDICAL GROUP | Facility: PHYSICIAN GROUP | Age: 83
End: 2018-03-09
Payer: MEDICARE

## 2018-03-09 VITALS
TEMPERATURE: 97.9 F | BODY MASS INDEX: 27.66 KG/M2 | RESPIRATION RATE: 16 BRPM | HEART RATE: 84 BPM | HEIGHT: 65 IN | DIASTOLIC BLOOD PRESSURE: 76 MMHG | WEIGHT: 166 LBS | OXYGEN SATURATION: 94 % | SYSTOLIC BLOOD PRESSURE: 124 MMHG

## 2018-03-09 DIAGNOSIS — E03.9 HYPOTHYROIDISM, UNSPECIFIED TYPE: ICD-10-CM

## 2018-03-09 DIAGNOSIS — C50.911 MALIGNANT NEOPLASM OF RIGHT FEMALE BREAST, UNSPECIFIED ESTROGEN RECEPTOR STATUS, UNSPECIFIED SITE OF BREAST (HCC): ICD-10-CM

## 2018-03-09 DIAGNOSIS — E03.8 OTHER SPECIFIED HYPOTHYROIDISM: ICD-10-CM

## 2018-03-09 DIAGNOSIS — R60.9 PERIPHERAL EDEMA: ICD-10-CM

## 2018-03-09 DIAGNOSIS — Z00.00 HEALTHCARE MAINTENANCE: ICD-10-CM

## 2018-03-09 DIAGNOSIS — E78.2 MIXED HYPERLIPIDEMIA: ICD-10-CM

## 2018-03-09 DIAGNOSIS — M51.36 DDD (DEGENERATIVE DISC DISEASE), LUMBAR: ICD-10-CM

## 2018-03-09 DIAGNOSIS — Z12.11 COLON CANCER SCREENING: ICD-10-CM

## 2018-03-09 PROBLEM — M51.369 DDD (DEGENERATIVE DISC DISEASE), LUMBAR: Status: ACTIVE | Noted: 2018-03-09

## 2018-03-09 PROBLEM — R60.0 PERIPHERAL EDEMA: Status: ACTIVE | Noted: 2018-03-09

## 2018-03-09 PROCEDURE — 99204 OFFICE O/P NEW MOD 45 MIN: CPT | Performed by: INTERNAL MEDICINE

## 2018-03-09 RX ORDER — SIMVASTATIN 20 MG
20 TABLET ORAL EVERY EVENING
Qty: 90 TAB | Refills: 2 | Status: SHIPPED | OUTPATIENT
Start: 2018-03-09 | End: 2018-06-19 | Stop reason: SDUPTHER

## 2018-03-09 RX ORDER — LEVOTHYROXINE SODIUM 0.03 MG/1
25 TABLET ORAL
Qty: 90 TAB | Refills: 2 | Status: SHIPPED | OUTPATIENT
Start: 2018-03-09 | End: 2019-01-09 | Stop reason: SDUPTHER

## 2018-03-09 ASSESSMENT — PATIENT HEALTH QUESTIONNAIRE - PHQ9: CLINICAL INTERPRETATION OF PHQ2 SCORE: 0

## 2018-03-09 NOTE — PROGRESS NOTES
CC:  To establish care with new PCP, hypothyroidism.    HISTORY OF THE PRESENT ILLNESS: Patient is a 82 y.o. female. This pleasant patient is here today to establish care with new primary care physician, discuss some medical problems as mentioned in history of present illness below.    Health Maintenance: Completed      DDD (degenerative disc disease), lumbar  Stable currently , took epidural injection in the back in 08/2016 . Not on any OTC medications but will be careful to avoid lifting wts and prevent the back pain.     Other specified hypothyroidism  This is a chronic health problem that is well controlled with current medications and lifestyle measures.diagnosed Since 2 yrs , TSH in 02/2017 came down to normal from the past elevated TSH. On levothyroxin 25 mcg QAM. Denies any symptoms of depression, cold intolerance, fatigue.    Hyperlipidemia  This is a chronic health problem that is well controlled with current medications and lifestyle measures. Currently On Simvastatin , last Lipid panel 01/2017 has improved to total cholesterol 180, triglycerides 76, HDL 66, LDL 99 compared to the previous LDL levels in 2014 and 13 when it was 108 and 180 respectively.        Healthcare maintenance  Colonoscopy 9 yrs back , due for it .  Will give referral for scheduling one. Got Flu shot this year. Last DEXA scan in 2016, showing osteopenia, patient on vitamin D supplements and her vitamin D deficiency, corrected in the last 2 checks.    Malignant neoplasm of right breast  This is a chronic health problem that is well controlled with current medications and lifestyle measures. Patient had her right partial mastectomy in 2016, followed by radiotherapy she did not need any chemotherapy. She follows oncology Dr. Ysabel Márquez whom she sees every 6 months last appointment in February 2018. We'll get the records from him. Currently she is on anastrozole 1 mg daily as managed by the oncologist. She gets yearly mammogram last  one done in June 2017 negative for any malignancy.      Peripheral edema  This is a new problem which patient is experiencing with bilateral pedal edema. Denies any exertional dyspnea, orthopnea, chest pain palpitations or syncope. Does admit that she's been stressful and most of the time stranding due to her  being hospitalized.    PHQ score 0, BMI within normal limits, former tobacco user counseled to remain abstained, no fall injuries    Allergies: Patient has no known allergies.    Current Outpatient Prescriptions Ordered in [facility]   Medication Sig Dispense Refill   • simvastatin (ZOCOR) 20 MG Tab Take 1 Tab by mouth every evening. 90 Tab 2   • levothyroxine (SYNTHROID) 25 MCG Tab Take 1 Tab by mouth Every morning on an empty stomach. 90 Tab 2   • aspirin (ASA) 81 MG Chew Tab chewable tablet Take 81 mg by mouth every day.     • Sodium Fluoride (PREVIDENT 5000 BOOSTER PLUS) 1.1 % Paste by dental route.     • Cholecalciferol (VITAMIN D-3 PO) Take  by mouth.     • anastrozole (ARIMIDEX) 1 MG Tab RX by oncologist 30 Tab 0   • vitamin D (CHOLECALCIFEROL) 1000 UNIT Tab Take 1,000 Units by mouth every evening.     • ascorbic acid (ASCORBIC ACID) 500 MG Tab Take 500 mg by mouth every day.     • Multiple Vitamin (MULTI-VITAMIN DAILY PO) Take  by mouth.     • Cyanocobalamin (VITAMIN B-12) 1000 MCG TABS Take 1,000 mcg by mouth every evening.     • fluticasone (FLONASE ALLERGY RELIEF) 50 MCG/ACT nasal spray Spray 1 Spray in nose 2 times a day. 16 g 0     No current Epic-ordered facility-administered medications on file.        Past Medical History:   Diagnosis Date   • Arthritis     shoulder   • Back pain    • Cancer (CMS-HCC)    • Cataract     genna IOLI   • DDD (degenerative disc disease), lumbar 3/9/2018   • Dental disorder     lower partial   • Healthcare maintenance 3/9/2018   • Hyperlipidemia    • Hypothyroid    • Lumbar spondylosis     with intermittent radiculopathy   • Peripheral edema 3/9/2018   • Snoring      sleep study done, negative, years ago       Past Surgical History:   Procedure Laterality Date   • MASTECTOMY Right 1/26/2016    Procedure: MASTECTOMY partial  ;  Surgeon: Rosy Parry M.D.;  Location: SURGERY [facility];  Service:    • NODE BIOPSY SENTINEL Right 1/26/2016    Procedure: NODE BIOPSY SENTINEL Lymph  ;  Surgeon: Rosy Parry M.D.;  Location: SURGERY SAME DAY [facility];  Service:    • ABDOMINAL HYSTERECTOMY TOTAL      non-cancer   • OOPHORECTOMY      tumor   • OTHER  2009, 2010    cataract IOLI genna   • US-NEEDLE CORE BX-BREAST PANEL         Social History   Substance Use Topics   • Smoking status: Former Smoker     Packs/day: 0.50     Years: 45.00     Types: Cigarettes     Quit date: 1/1/1998   • Smokeless tobacco: Never Used      Comment: avoid all tobacco products   • Alcohol use 0.0 oz/week      Comment: 7/year       Social History     Social History Narrative   • No narrative on file       Family History   Problem Relation Age of Onset   • Heart Disease Mother    • Other Father        ROS:     - Constitutional: Negative for fever, chills, unexpected weight change, and fatigue/generalized weakness.     - HEENT: Negative for headaches, vision changes, hearing changes, ear pain, ear discharge, rhinorrhea, sinus congestion, sore throat, and neck pain.      - Respiratory: Negative for cough, sputum production, chest congestion, dyspnea, wheezing, and crackles.      - Cardiovascular: Negative for chest pain, palpitations, orthopnea, and bilateral lower extremity edema.     - Gastrointestinal: Negative for heartburn, nausea, vomiting, abdominal pain, hematochezia, melena, diarrhea, constipation, and greasy/foul-smelling stools.     - Genitourinary: Negative for dysuria, polyuria, hematuria, pyuria, urinary urgency, and urinary incontinence.     - Musculoskeletal: Negative for myalgias, back pain, and joint pain.     - Skin: Negative for rash, itching, cyanotic skin color change.     -  "Neurological: Negative for dizziness, tingling, tremors, focal sensory deficit, focal weakness and headaches.     - Endo/Heme/Allergies: Does not bruise/bleed easily.     - Psychiatric/Behavioral: Negative for depression, suicidal/homicidal ideation and memory loss.        Exam: Blood pressure 124/76, pulse 84, temperature 36.6 °C (97.9 °F), resp. rate 16, height 1.654 m (5' 5.1\"), weight 75.3 kg (166 lb), SpO2 94 %. Body mass index is 27.54 kg/m².    General: Normal appearing. No distress.  HEENT: Normocephalic. Eyes conjunctiva clear lids without ptosis, pupils equal and reactive to light accommodation, ears normal shape and contour, canals are clear bilaterally, tympanic membranes are benign, nasal mucosa benign, oropharynx is without erythema, edema or exudates.   Neck: Supple without JVD or bruit. Thyroid is not enlarged.  Pulmonary: Clear to ausculation.  Normal effort. No rales, ronchi, or wheezing.  Cardiovascular: Regular rate and rhythm without murmur. Carotid and radial pulses are intact and equal bilaterally. Bilateral pedal edema of 1+.  Abdomen: Soft, nontender, nondistended. Normal bowel sounds. Liver and spleen are not palpable  Neurologic: Grossly nonfocal  Lymph: No cervical, supraclavicular or axillary lymph nodes are palpable  Skin: Warm and dry.  No obvious lesions.  Musculoskeletal: Normal gait. No extremity cyanosis, clubbing, or edema.  Psych: Normal mood and affect. Alert and oriented x3. Judgment and insight is normal.    Please note that this dictation was created using voice recognition software. I have made every reasonable attempt to correct obvious errors, but I expect that there are errors of grammar and possibly content that I did not discover before finalizing the note.      Assessment/Plan  1. Mixed hyperlipidemia  Well-controlled on current simvastatin 20 mg every afternoon and to continue the same we'll do the results.  - LIPID PROFILE; Future  - simvastatin (ZOCOR) 20 MG Tab; " Take 1 Tab by mouth every evening.  Dispense: 90 Tab; Refill: 2    2. Hypothyroidism, unspecified type  Well-controlled on current levothyroxine dose of 25 µg every a.m., will recheck the TSH levels this year.  - TSH WITH REFLEX TO FT4; Future  - levothyroxine (SYNTHROID) 25 MCG Tab; Take 1 Tab by mouth Every morning on an empty stomach.  Dispense: 90 Tab; Refill: 2    3. Malignant neoplasm of right female breast, unspecified estrogen receptor status, unspecified site of breast (CMS-HCC)  Stable currently as per the last mammogram in June 2017. Patient follows Dr. Márquez oncologist and is on anastrozole medication managed by him. We will give the orders for repeat mammogram in June 2018. Patient signed medical's request form and we'll get the records from the oncologist.  - [id number]; Future    4. Peripheral edema  Likely dependent edema given absence of any symptoms of cardiovascular or respiratory. We will check a BNP level and give elastic stockings at this time.  - BTYPE NATRIURETIC PEPTIDE; Future  - ELASTIC SUPPORT STOCKINGS    5. DDD (degenerative disc disease), lumbar  As seen in the CT lumbar spine at L4-L5 levels with foraminal stenosis . Stable with no symptoms, previous epidural last taken in 2016.    6. Colon cancer screening  Last colonoscopy done 9 years back which was within normal limits done by Dr. Anjelica OLMSTEAD. Patient prefers to follow only him again we'll give a referral for colonoscopy to be scheduled.  - REFERRAL TO GI FOR COLONOSCOPY

## 2018-03-09 NOTE — ASSESSMENT & PLAN NOTE
This is a chronic health problem that is well controlled with current medications and lifestyle measures. Patient had her right partial mastectomy in 2016, followed by radiotherapy she did not need any chemotherapy. She follows oncology Dr. Ysabel Márquez whom she sees every 6 months last appointment in February 2018. We'll get the records from him. Currently she is on anastrozole 1 mg daily as managed by the oncologist. She gets yearly mammogram last one done in June 2017 negative for any malignancy.

## 2018-03-09 NOTE — ASSESSMENT & PLAN NOTE
This is a chronic health problem that is well controlled with current medications and lifestyle measures. Currently On Simvastatin , last Lipid panel 01/2017 has improved to total cholesterol 180, triglycerides 76, HDL 66, LDL 99 compared to the previous LDL levels in 2014 and 13 when it was 108 and 180 respectively.

## 2018-03-09 NOTE — ASSESSMENT & PLAN NOTE
Stable currently , took epidural injection in the back in 08/2016 . Not on any OTC medications but will be careful to avoid lifting wts and prevent the back pain.

## 2018-03-09 NOTE — ASSESSMENT & PLAN NOTE
This is a chronic health problem that is well controlled with current medications and lifestyle measures.diagnosed Since 2 yrs , TSH in 02/2017 came down to normal from the past elevated TSH. On levothyroxin 25 mcg QAM. Denies any symptoms of depression, cold intolerance, fatigue.

## 2018-03-09 NOTE — ASSESSMENT & PLAN NOTE
This is a new problem which patient is experiencing with bilateral pedal edema. Denies any exertional dyspnea, orthopnea, chest pain palpitations or syncope. Does admit that she's been stressful and most of the time stranding due to her  being hospitalized.

## 2018-03-09 NOTE — ASSESSMENT & PLAN NOTE
Colonoscopy 9 yrs back , due for it .  Will give referral for scheduling one. Got Flu shot this year. Last DEXA scan in 2016, showing osteopenia, patient on vitamin D supplements and her vitamin D deficiency, corrected in the last 2 checks.

## 2018-03-12 ENCOUNTER — HOSPITAL ENCOUNTER (OUTPATIENT)
Dept: LAB | Facility: MEDICAL CENTER | Age: 83
End: 2018-03-12
Attending: INTERNAL MEDICINE
Payer: MEDICARE

## 2018-03-12 DIAGNOSIS — E03.9 HYPOTHYROIDISM, UNSPECIFIED TYPE: ICD-10-CM

## 2018-03-12 DIAGNOSIS — E78.2 MIXED HYPERLIPIDEMIA: ICD-10-CM

## 2018-03-12 DIAGNOSIS — R60.9 PERIPHERAL EDEMA: ICD-10-CM

## 2018-03-12 LAB
25(OH)D3 SERPL-MCNC: 43 NG/ML (ref 30–100)
ALBUMIN SERPL BCP-MCNC: 3.7 G/DL (ref 3.2–4.9)
ALBUMIN/GLOB SERPL: 1.5 G/DL
ALP SERPL-CCNC: 62 U/L (ref 30–99)
ALT SERPL-CCNC: 14 U/L (ref 2–50)
ANION GAP SERPL CALC-SCNC: 6 MMOL/L (ref 0–11.9)
AST SERPL-CCNC: 17 U/L (ref 12–45)
BILIRUB SERPL-MCNC: 0.9 MG/DL (ref 0.1–1.5)
BNP SERPL-MCNC: 67 PG/ML (ref 0–100)
BUN SERPL-MCNC: 11 MG/DL (ref 8–22)
CALCIUM SERPL-MCNC: 9.3 MG/DL (ref 8.5–10.5)
CHLORIDE SERPL-SCNC: 104 MMOL/L (ref 96–112)
CHOLEST SERPL-MCNC: 142 MG/DL (ref 100–199)
CO2 SERPL-SCNC: 31 MMOL/L (ref 20–33)
CREAT SERPL-MCNC: 0.65 MG/DL (ref 0.5–1.4)
GLOBULIN SER CALC-MCNC: 2.4 G/DL (ref 1.9–3.5)
GLUCOSE SERPL-MCNC: 94 MG/DL (ref 65–99)
HDLC SERPL-MCNC: 59 MG/DL
LDLC SERPL CALC-MCNC: 70 MG/DL
POTASSIUM SERPL-SCNC: 4.3 MMOL/L (ref 3.6–5.5)
PROT SERPL-MCNC: 6.1 G/DL (ref 6–8.2)
SODIUM SERPL-SCNC: 141 MMOL/L (ref 135–145)
TRIGL SERPL-MCNC: 67 MG/DL (ref 0–149)
TSH SERPL DL<=0.005 MIU/L-ACNC: 4.94 UIU/ML (ref 0.38–5.33)

## 2018-03-12 PROCEDURE — 80053 COMPREHEN METABOLIC PANEL: CPT

## 2018-03-12 PROCEDURE — 84443 ASSAY THYROID STIM HORMONE: CPT

## 2018-03-12 PROCEDURE — 36415 COLL VENOUS BLD VENIPUNCTURE: CPT

## 2018-03-12 PROCEDURE — 82306 VITAMIN D 25 HYDROXY: CPT

## 2018-03-12 PROCEDURE — 83880 ASSAY OF NATRIURETIC PEPTIDE: CPT | Mod: GA

## 2018-03-12 PROCEDURE — 80061 LIPID PANEL: CPT

## 2018-03-16 ENCOUNTER — TELEPHONE (OUTPATIENT)
Dept: URGENT CARE | Facility: PHYSICIAN GROUP | Age: 83
End: 2018-03-16

## 2018-03-16 NOTE — TELEPHONE ENCOUNTER
----- Message from Carrie Albrecht M.D. sent at 3/15/2018 11:47 PM PDT -----      ----- Message -----  From: Kath Dowd, Bryson Ass't  Sent: 3/15/2018   1:33 PM  To: Carrie Albrecht M.D.    Please forward to correct pool sent to [facility] med group.Thanks Katey   ----- Message -----  From: Carrie Albrecht M.D.  Sent: 3/15/2018  12:45 PM  To: [facility]    All of your labs were normal. You may see some that are highlighted, however these have no clinical significance.  Carrie Albrecht M.D.

## 2018-06-19 DIAGNOSIS — E78.2 MIXED HYPERLIPIDEMIA: ICD-10-CM

## 2018-06-19 NOTE — TELEPHONE ENCOUNTER
Was the patient seen in the last year in this department? Yes     Does patient have an active prescription for medications requested? Yes     Received Request Via: Pharmacy      Pt met protocol?: Yes pt last ov 3/18 change in pharmacy   Lab Results  Component Value Date/Time   CHOLSTRLTOT 142 03/12/2018 0738   TRIGLYCERIDE 67 03/12/2018 0738   HDL 59 03/12/2018 0738   LDL 70 03/12/2018 0738

## 2018-06-20 DIAGNOSIS — E03.9 HYPOTHYROIDISM, UNSPECIFIED TYPE: ICD-10-CM

## 2018-06-20 RX ORDER — SIMVASTATIN 20 MG
20 TABLET ORAL EVERY EVENING
Qty: 90 TAB | Refills: 2 | Status: SHIPPED | OUTPATIENT
Start: 2018-06-20 | End: 2019-01-18 | Stop reason: SDUPTHER

## 2018-06-20 RX ORDER — LEVOTHYROXINE SODIUM 0.03 MG/1
25 TABLET ORAL
Qty: 90 TAB | Refills: 2 | OUTPATIENT
Start: 2018-06-20

## 2018-06-20 NOTE — TELEPHONE ENCOUNTER
Pt has had OV within the 12 month protocol and lipid panel is current. 9 month supply sent to pharmacy.   Lab Results   Component Value Date/Time    CHOLSTRLTOT 142 03/12/2018 07:38 AM    LDL 70 03/12/2018 07:38 AM    HDL 59 03/12/2018 07:38 AM    TRIGLYCERIDE 67 03/12/2018 07:38 AM       Lab Results   Component Value Date/Time    SODIUM 141 03/12/2018 07:37 AM    POTASSIUM 4.3 03/12/2018 07:37 AM    CHLORIDE 104 03/12/2018 07:37 AM    CO2 31 03/12/2018 07:37 AM    GLUCOSE 94 03/12/2018 07:37 AM    BUN 11 03/12/2018 07:37 AM    CREATININE 0.65 03/12/2018 07:37 AM     Lab Results   Component Value Date/Time    ALKPHOSPHAT 62 03/12/2018 07:37 AM    ASTSGOT 17 03/12/2018 07:37 AM    ALTSGPT 14 03/12/2018 07:37 AM    TBILIRUBIN 0.9 03/12/2018 07:37 AM

## 2018-06-21 ENCOUNTER — HOSPITAL ENCOUNTER (OUTPATIENT)
Dept: RADIOLOGY | Facility: MEDICAL CENTER | Age: 83
End: 2018-06-21
Attending: INTERNAL MEDICINE
Payer: MEDICARE

## 2018-06-21 DIAGNOSIS — Z12.31 VISIT FOR SCREENING MAMMOGRAM: ICD-10-CM

## 2018-06-21 PROCEDURE — 77063 BREAST TOMOSYNTHESIS BI: CPT

## 2018-09-19 ENCOUNTER — TELEPHONE (OUTPATIENT)
Dept: MEDICAL GROUP | Facility: PHYSICIAN GROUP | Age: 83
End: 2018-09-19

## 2018-09-19 NOTE — TELEPHONE ENCOUNTER
Future Appointments       Provider Department Center    9/20/2018 8:00 AM Carrie Albrecht M.D. [facility]        ESTABLISHED PATIENT PRE-VISIT PLANNING     Note: Patient will not be contacted if there is no indication to call.     1.  Reviewed notes from the last few office visits within the medical group: Yes    2.  If any orders were placed at last visit or intended to be done for this visit (i.e. 6 mos follow-up), do we have Results/Consult Notes?        •  Labs - Labs ordered, completed on 03/12/18 and results are in chart.       •  Imaging - Imaging ordered, completed and results are in chart.       •  Referrals - No referrals were ordered at last office visit.    3. Is this appointment scheduled as a Hospital Follow-Up? No    4.  Immunizations were updated in [facility] using WebIZ?: Yes       •  Web Iz Recommendations: FLU, PREVNAR (PCV13) , TDAP and ZOSTAVAX (Shingles)    5.  Patient is due for the following Health Maintenance Topics:   Health Maintenance Due   Topic Date Due   • Annual Wellness Visit  09/03/2016   • IMM ZOSTER VACCINES (2 of 3) 11/03/2016   • IMM PNEUMOCOCCAL  (2 of 2 - PPSV23) 09/08/2017   • IMM INFLUENZA (1) 09/01/2018   • COLONOSCOPY  10/02/2018     6.  MDX printed for Provider? NO    7.  Patient was informed to arrive 15 min prior to their scheduled appointment and bring in their medication bottles.

## 2018-09-20 ENCOUNTER — OFFICE VISIT (OUTPATIENT)
Dept: MEDICAL GROUP | Facility: PHYSICIAN GROUP | Age: 83
End: 2018-09-20
Payer: MEDICARE

## 2018-09-20 VITALS
SYSTOLIC BLOOD PRESSURE: 128 MMHG | DIASTOLIC BLOOD PRESSURE: 74 MMHG | HEART RATE: 88 BPM | BODY MASS INDEX: 33.38 KG/M2 | WEIGHT: 170 LBS | OXYGEN SATURATION: 94 % | HEIGHT: 60 IN | TEMPERATURE: 97.8 F

## 2018-09-20 DIAGNOSIS — M51.36 DDD (DEGENERATIVE DISC DISEASE), LUMBAR: ICD-10-CM

## 2018-09-20 DIAGNOSIS — E66.9 OBESITY (BMI 30-39.9): ICD-10-CM

## 2018-09-20 DIAGNOSIS — Z23 NEED FOR VACCINATION: ICD-10-CM

## 2018-09-20 DIAGNOSIS — C50.911 MALIGNANT NEOPLASM OF RIGHT FEMALE BREAST, UNSPECIFIED ESTROGEN RECEPTOR STATUS, UNSPECIFIED SITE OF BREAST (HCC): ICD-10-CM

## 2018-09-20 DIAGNOSIS — E03.8 OTHER SPECIFIED HYPOTHYROIDISM: ICD-10-CM

## 2018-09-20 DIAGNOSIS — E66.9 CLASS 1 OBESITY WITH BODY MASS INDEX (BMI) OF 33.0 TO 33.9 IN ADULT, UNSPECIFIED OBESITY TYPE, UNSPECIFIED WHETHER SERIOUS COMORBIDITY PRESENT: ICD-10-CM

## 2018-09-20 PROCEDURE — 90662 IIV NO PRSV INCREASED AG IM: CPT | Performed by: INTERNAL MEDICINE

## 2018-09-20 PROCEDURE — 99214 OFFICE O/P EST MOD 30 MIN: CPT | Mod: 25 | Performed by: INTERNAL MEDICINE

## 2018-09-20 PROCEDURE — G0008 ADMIN INFLUENZA VIRUS VAC: HCPCS | Performed by: INTERNAL MEDICINE

## 2018-09-20 NOTE — ASSESSMENT & PLAN NOTE
This is a chronic health problem that is well controlled with current medications and lifestyle measures. Last TSH in 03/2018. On LT 25 mcg QAM.

## 2018-09-20 NOTE — ASSESSMENT & PLAN NOTE
This is a chronic health problem that is well controlled with current medications and lifestyle measures. Has appointment with  in 10/2018. Mammogram in 06/2018 was normal. On Anastrazole 1mg daily.

## 2018-09-20 NOTE — ASSESSMENT & PLAN NOTE
This is a chronic health problem that is well controlled with current medications and lifestyle measures. Not on pain meds, S/P 2016.

## 2018-09-20 NOTE — PROGRESS NOTES
CC: Follow-up visit, breast cancer.  Requesting for DMV paperwork to be filled in was done in this visit.    HISTORY OF PRESENT ILLNESS: Patient is a 82 y.o. female established patient who presents today to discuss her medical conditions as mentioned in history of present illness below.    Health Maintenance: Completed    Malignant neoplasm of right breast  This is a chronic health problem that is well controlled with current medications and lifestyle measures. Has appointment with  in 10/2018. Mammogram in 06/2018 was normal. On Anastrazole 1mg daily.    DDD (degenerative disc disease), lumbar  This is a chronic health problem that is well controlled with current medications and lifestyle measures. Not on pain meds, S/P 2016.    Other specified hypothyroidism  This is a chronic health problem that is well controlled with current medications and lifestyle measures. Last TSH in 03/2018. On LT 25 mcg QAM.      PHQ score 0, BMI > 33 , Former tobacco, no fall injuries.    Patient Active Problem List    Diagnosis Date Noted   • Hx of Nonspecific abnormal electrocardiogram (ECG) (EKG) 06/03/2014     Priority: Medium   • Hyperlipidemia 02/22/2010     Priority: Low   • Obesity (BMI 30-39.9) 09/20/2018   • DDD (degenerative disc disease), lumbar 03/09/2018   • Healthcare maintenance 03/09/2018   • Peripheral edema 03/09/2018   • Other specified hypothyroidism 02/23/2016   • Malignant neoplasm of right breast (HCC) 01/26/2016      Allergies:Patient has no known allergies.    Current Outpatient Prescriptions   Medication Sig Dispense Refill   • NON SPECIFIED Please give Shingrix vaccine 1 Each 0   • simvastatin (ZOCOR) 20 MG Tab Take 1 Tab by mouth every evening. 90 Tab 2   • levothyroxine (SYNTHROID) 25 MCG Tab Take 1 Tab by mouth Every morning on an empty stomach. 90 Tab 2   • aspirin (ASA) 81 MG Chew Tab chewable tablet Take 81 mg by mouth every day.     • Sodium Fluoride (PREVIDENT 5000 BOOSTER PLUS) 1.1 % Paste  by dental route.     • Cholecalciferol (VITAMIN D-3 PO) Take  by mouth.     • fluticasone (FLONASE ALLERGY RELIEF) 50 MCG/ACT nasal spray Spray 1 Spray in nose 2 times a day. 16 g 0   • anastrozole (ARIMIDEX) 1 MG Tab RX by oncologist 30 Tab 0   • vitamin D (CHOLECALCIFEROL) 1000 UNIT Tab Take 1,000 Units by mouth every evening.     • ascorbic acid (ASCORBIC ACID) 500 MG Tab Take 500 mg by mouth every day.     • Multiple Vitamin (MULTI-VITAMIN DAILY PO) Take  by mouth.     • Cyanocobalamin (VITAMIN B-12) 1000 MCG TABS Take 1,000 mcg by mouth every evening.       No current facility-administered medications for this visit.        Social History   Substance Use Topics   • Smoking status: Former Smoker     Packs/day: 0.50     Years: 45.00     Types: Cigarettes     Quit date: 1/1/1998   • Smokeless tobacco: Never Used      Comment: avoid all tobacco products   • Alcohol use 0.0 oz/week      Comment: 7/year     Social History     Social History Narrative   • No narrative on file       Family History   Problem Relation Age of Onset   • Heart Disease Mother    • Other Father         ROS:     - Constitutional:  Negative for fever, chills, unexpected weight change, and fatigue/generalized weakness.    - HEENT:  Negative for headaches, vision changes, hearing changes, ear pain, ear discharge, rhinorrhea, sinus congestion, sore throat, and neck pain.      - Respiratory: Negative for cough, sputum production, chest congestion, dyspnea, wheezing, and crackles.      - Cardiovascular: Negative for chest pain, palpitations, orthopnea, and bilateral lower extremity edema.     - Gastrointestinal: Negative for heartburn, nausea, vomiting, abdominal pain, hematochezia, melena, diarrhea, constipation, and greasy/foul-smelling stools.     - Genitourinary: Negative for dysuria, polyuria, hematuria, pyuria, urinary urgency, and urinary incontinence.     - Musculoskeletal: Negative for myalgias, back pain, and joint pain.     - Skin:  "Negative for rash, itching, cyanotic skin color change.     - Neurological: Negative for dizziness, tingling, tremors, focal sensory deficit, focal weakness and headaches.     - Endo/Heme/Allergies: Does not bruise/bleed easily.     - Psychiatric/Behavioral: Negative for depression, suicidal/homicidal ideation and memory loss.      Last lab work in March 2008 and reviewed and discussed with the patient.    Exam:    Blood pressure 128/74, pulse 88, temperature 36.6 °C (97.8 °F), temperature source Temporal, height 1.511 m (4' 11.5\"), weight 77.1 kg (170 lb), SpO2 94 %, not currently breastfeeding. Body mass index is 33.76 kg/m².    General:  Well nourished, well developed female in NAD  Head is grossly normal.  Neck: Supple without JVD or bruit. Thyroid is not enlarged.  Pulmonary: Clear to ausculation and percussion.  Normal effort. No rales, ronchi, or wheezing.  Cardiovascular: Regular rate and rhythm without murmur. Carotid and radial pulses are intact and equal bilaterally.  Extremities: no clubbing, cyanosis, or edema.  Back exam : No tenderness on lumbosacral spine or paraspinal tenderness. Patient is able to move around very easily without any difficulties, get up and go test successful.    Please note that this dictation was created using voice recognition software. I have made every reasonable attempt to correct obvious errors, but I expect that there are errors of grammar and possibly content that I did not discover before finalizing the note.    Assessment/Plan:  1. DDD (degenerative disc disease), lumbar  Well controlled, not on any pain medications at this time. To continue vitamin D 2000 units daily, when necessary Tylenol for pain.    2. Malignant neoplasm of right female breast, unspecified estrogen receptor status, unspecified site of breast (HCC)  Stable, continue to follow up with oncology Dr. Márquez. Continue anastrozole 1 mg daily.    3. Other specified hypothyroidism   Well-controlled, last TSH " in March 2008 and within normal limits. Continue the current levothyroxine 25 mg every morning.    4. Need for vaccination  - NON SPECIFIED; Please give Shingrix vaccine  Dispense: 1 Each; Refill: 0  - INFLUENZA VACCINE, HIGH DOSE (65+ ONLY)  - PneumoVax PPV23 =>1yo    5. Obesity (BMI 30-39.9)  6. Class 1 obesity with body mass index (BMI) of 33.0 to 33.9 in adult, unspecified obesity type, unspecified whether serious comorbidity present  Pt educated on the increase of morbidity and mortality associated with excess weight including DM, Heart Disease, HTN, stroke, and sleep apnea.  Pt advised weight loss of 5% through reduced calorie, low fat diet and 150 mins of exercise a week  Recommend obesity clinic if patient is unsuccessful with weight loss  - Patient identified as having weight management issue.  Appropriate orders and counseling given.

## 2018-09-20 NOTE — PATIENT INSTRUCTIONS
Back Pain, Adult  Back pain is very common in adults. The cause of back pain is rarely dangerous and the pain often gets better over time. The cause of your back pain may not be known. Some common causes of back pain include:  · Strain of the muscles or ligaments supporting the spine.  · Wear and tear (degeneration) of the spinal disks.  · Arthritis.  · Direct injury to the back.  For many people, back pain may return. Since back pain is rarely dangerous, most people can learn to manage this condition on their own.  Follow these instructions at home:  Watch your back pain for any changes. The following actions may help to lessen any discomfort you are feeling:  · Remain active. It is stressful on your back to sit or  one place for long periods of time. Do not sit, drive, or  one place for more than 30 minutes at a time. Take short walks on even surfaces as soon as you are able. Try to increase the length of time you walk each day.  · Exercise regularly as directed by your health care provider. Exercise helps your back heal faster. It also helps avoid future injury by keeping your muscles strong and flexible.  · Do not stay in bed. Resting more than 1-2 days can delay your recovery.  · Pay attention to your body when you bend and lift. The most comfortable positions are those that put less stress on your recovering back. Always use proper lifting techniques, including:  ¨ Bending your knees.  ¨ Keeping the load close to your body.  ¨ Avoiding twisting.  · Find a comfortable position to sleep. Use a firm mattress and lie on your side with your knees slightly bent. If you lie on your back, put a pillow under your knees.  · Avoid feeling anxious or stressed. Stress increases muscle tension and can worsen back pain. It is important to recognize when you are anxious or stressed and learn ways to manage it, such as with exercise.  · Take medicines only as directed by your health care provider.  Over-the-counter medicines to reduce pain and inflammation are often the most helpful. Your health care provider may prescribe muscle relaxant drugs. These medicines help dull your pain so you can more quickly return to your normal activities and healthy exercise.  · Apply ice to the injured area:  ¨ Put ice in a plastic bag.  ¨ Place a towel between your skin and the bag.  ¨ Leave the ice on for 20 minutes, 2-3 times a day for the first 2-3 days. After that, ice and heat may be alternated to reduce pain and spasms.  · Maintain a healthy weight. Excess weight puts extra stress on your back and makes it difficult to maintain good posture.  Contact a health care provider if:  · You have pain that is not relieved with rest or medicine.  · You have increasing pain going down into the legs or buttocks.  · You have pain that does not improve in one week.  · You have night pain.  · You lose weight.  · You have a fever or chills.  Get help right away if:  · You develop new bowel or bladder control problems.  · You have unusual weakness or numbness in your arms or legs.  · You develop nausea or vomiting.  · You develop abdominal pain.  · You feel faint.  This information is not intended to replace advice given to you by your health care provider. Make sure you discuss any questions you have with your health care provider.  Document Released: 12/18/2006 Document Revised: 04/27/2017 Document Reviewed: 04/21/2015  [employer] Patient Education © 2017 [employer]

## 2018-12-16 ENCOUNTER — OFFICE VISIT (OUTPATIENT)
Dept: URGENT CARE | Facility: PHYSICIAN GROUP | Age: 83
End: 2018-12-16
Payer: MEDICARE

## 2018-12-16 ENCOUNTER — APPOINTMENT (OUTPATIENT)
Dept: RADIOLOGY | Facility: IMAGING CENTER | Age: 83
End: 2018-12-16
Attending: PHYSICIAN ASSISTANT
Payer: MEDICARE

## 2018-12-16 VITALS
DIASTOLIC BLOOD PRESSURE: 80 MMHG | HEIGHT: 59 IN | OXYGEN SATURATION: 90 % | HEART RATE: 95 BPM | WEIGHT: 170 LBS | BODY MASS INDEX: 34.27 KG/M2 | SYSTOLIC BLOOD PRESSURE: 128 MMHG | TEMPERATURE: 98.5 F | RESPIRATION RATE: 17 BRPM

## 2018-12-16 DIAGNOSIS — R05.9 COUGH: ICD-10-CM

## 2018-12-16 DIAGNOSIS — J06.9 ACUTE URI: ICD-10-CM

## 2018-12-16 LAB
FLUAV+FLUBV AG SPEC QL IA: NEGATIVE
INT CON NEG: NEGATIVE
INT CON POS: POSITIVE

## 2018-12-16 PROCEDURE — 87804 INFLUENZA ASSAY W/OPTIC: CPT | Performed by: PHYSICIAN ASSISTANT

## 2018-12-16 PROCEDURE — 71046 X-RAY EXAM CHEST 2 VIEWS: CPT | Mod: TC | Performed by: PHYSICIAN ASSISTANT

## 2018-12-16 PROCEDURE — 99214 OFFICE O/P EST MOD 30 MIN: CPT | Performed by: PHYSICIAN ASSISTANT

## 2018-12-16 RX ORDER — CODEINE PHOSPHATE/GUAIFENESIN 10-100MG/5
10 LIQUID (ML) ORAL 4 TIMES DAILY PRN
Qty: 200 ML | Refills: 0 | Status: SHIPPED | OUTPATIENT
Start: 2018-12-16 | End: 2018-12-21

## 2018-12-16 ASSESSMENT — ENCOUNTER SYMPTOMS
PALPITATIONS: 0
COUGH: 1
WHEEZING: 0
SPUTUM PRODUCTION: 1
SHORTNESS OF BREATH: 0
FEVER: 0
SINUS PAIN: 0
HEADACHES: 0
SORE THROAT: 0
RHINORRHEA: 1
STRIDOR: 0
MYALGIAS: 0

## 2018-12-16 NOTE — PROGRESS NOTES
Subjective:      Corrinne Alice Carmignani is a 83 y.o. female who presents with Cough (chest congestion x 4 days )    PMH:  has a past medical history of Arthritis; Back pain; Cancer (HCC); Cataract; DDD (degenerative disc disease), lumbar (3/9/2018); Dental disorder; Healthcare maintenance (3/9/2018); Hyperlipidemia; Hypothyroid; Lumbar spondylosis; Peripheral edema (3/9/2018); and Snoring.  MEDS:   Current Outpatient Prescriptions:   •  NON SPECIFIED, Please give Shingrix vaccine, Disp: 1 Each, Rfl: 0  •  simvastatin (ZOCOR) 20 MG Tab, Take 1 Tab by mouth every evening., Disp: 90 Tab, Rfl: 2  •  levothyroxine (SYNTHROID) 25 MCG Tab, Take 1 Tab by mouth Every morning on an empty stomach., Disp: 90 Tab, Rfl: 2  •  aspirin (ASA) 81 MG Chew Tab chewable tablet, Take 81 mg by mouth every day., Disp: , Rfl:   •  Sodium Fluoride (PREVIDENT 5000 BOOSTER PLUS) 1.1 % Paste, by dental route., Disp: , Rfl:   •  Cholecalciferol (VITAMIN D-3 PO), Take  by mouth., Disp: , Rfl:   •  fluticasone (FLONASE ALLERGY RELIEF) 50 MCG/ACT nasal spray, Spray 1 Spray in nose 2 times a day., Disp: 16 g, Rfl: 0  •  anastrozole (ARIMIDEX) 1 MG Tab, RX by oncologist, Disp: 30 Tab, Rfl: 0  •  vitamin D (CHOLECALCIFEROL) 1000 UNIT Tab, Take 1,000 Units by mouth every evening., Disp: , Rfl:   •  ascorbic acid (ASCORBIC ACID) 500 MG Tab, Take 500 mg by mouth every day., Disp: , Rfl:   •  Multiple Vitamin (MULTI-VITAMIN DAILY PO), Take  by mouth., Disp: , Rfl:   •  Cyanocobalamin (VITAMIN B-12) 1000 MCG TABS, Take 1,000 mcg by mouth every evening., Disp: , Rfl:   ALLERGIES: No Known Allergies  SURGHX:   Past Surgical History:   Procedure Laterality Date   • MASTECTOMY Right 1/26/2016    Procedure: MASTECTOMY partial  ;  Surgeon: Rosy Parry M.D.;  Location: SURGERY SAME DAY [facility];  Service:    • NODE BIOPSY SENTINEL Right 1/26/2016    Procedure: NODE BIOPSY SENTINEL Lymph  ;  Surgeon: Rosy Parry M.D.;  Location: SURGERY SAME DAY  [facility];  Service:    • ABDOMINAL HYSTERECTOMY TOTAL      non-cancer   • OOPHORECTOMY      tumor   • OTHER  2009, 2010    cataract IOLI genna   • US-NEEDLE CORE BX-BREAST PANEL       SOCHX:  reports that she quit smoking about 20 years ago. Her smoking use included Cigarettes. She has a 22.50 pack-year smoking history. She has never used smokeless tobacco. She reports that she drinks alcohol. She reports that she does not use drugs.  FH: Reviewed with patient, not pertinent to this visit.           Patient presents with:  Cough: chest congestion x 4 days .  Pt  has had recent knee surgery wants to make sure she does not have flu or pneumonia.  Pt states symptoms began as sneezing, runny nose and congestion for the first 2 days, however she feels that that has now settled down into her chest and her upper respiratory symptoms are gone.  Patient has tried Mucinex D as well as TheraFlu some improvement in the daytime but not very helpful at night.  Patient states her cough keeps her awake.  Patient denies any other complaint today.        Cough   This is a new problem. The current episode started in the past 7 days. The problem has been gradually worsening. The problem occurs every few minutes. Cough characteristics: Mostly dry but sometimes productive. Associated symptoms include nasal congestion, postnasal drip and rhinorrhea. Pertinent negatives include no chest pain, fever, headaches, myalgias, sore throat, shortness of breath or wheezing. The symptoms are aggravated by exercise, cold air and lying down. Risk factors for lung disease include smoking/tobacco exposure. She has tried body position changes, OTC cough suppressant and rest for the symptoms. The treatment provided no relief. Her past medical history is significant for bronchitis. There is no history of asthma.       Review of Systems   Constitutional: Negative for fever.   HENT: Positive for congestion, postnasal drip and rhinorrhea. Negative  "for sinus pain and sore throat.    Respiratory: Positive for cough and sputum production. Negative for shortness of breath, wheezing and stridor.    Cardiovascular: Negative for chest pain and palpitations.   Musculoskeletal: Negative for myalgias.   Neurological: Negative for headaches.   All other systems reviewed and are negative.         Objective:     /80   Pulse 95   Temp 36.9 °C (98.5 °F) (Temporal)   Resp 17   Ht 1.499 m (4' 11\")   Wt 77.1 kg (170 lb)   SpO2 90%   BMI 34.34 kg/m²      Physical Exam   Constitutional: She is oriented to person, place, and time. She appears well-developed and well-nourished. She is cooperative. She does not appear ill. No distress.   HENT:   Head: Normocephalic and atraumatic.   Right Ear: Tympanic membrane normal.   Left Ear: Tympanic membrane normal.   Nose: Rhinorrhea present. No mucosal edema.   Mouth/Throat: Uvula is midline, oropharynx is clear and moist and mucous membranes are normal. No posterior oropharyngeal erythema.   Eyes: Pupils are equal, round, and reactive to light. Conjunctivae and EOM are normal.   Neck: Normal range of motion. Neck supple.   Cardiovascular: Normal rate, regular rhythm and normal heart sounds.    Pulmonary/Chest: Effort normal. No respiratory distress. She has no decreased breath sounds. She has no wheezes. She has rhonchi. She has no rales.   Dry cough   Abdominal: Soft.   Musculoskeletal: Normal range of motion.   Neurological: She is alert and oriented to person, place, and time. Gait normal.   Skin: Skin is warm and dry. Capillary refill takes less than 2 seconds.   Psychiatric: She has a normal mood and affect.   Nursing note and vitals reviewed.         Xray images viewed and interpreted by me, confirmed by radiology:    Impression     No acute cardiopulmonary disease.          Reading Provider Reading Date   Isiah Milton M.D. Dec 16, 2018          Signing Provider Signing Date Signing Time   Isiah Milton M.D. " Dec 16, 2018 11:55 AM         Flu: Neg    Assessment/Plan:     1. Cough  POCT Influenza A/B    DX-CHEST-2 VIEWS    guaifenesin-codeine (TUSSI-ORGANIDIN NR) 100-10 MG/5ML syrup   2. Acute URI  guaifenesin-codeine (TUSSI-ORGANIDIN NR) 100-10 MG/5ML syrup       Discussed that I felt this was viral in nature. Did not see any evidence of a bacterial process. Discussed natural progression and sx care.    PT instructed not to drive or operate heavy machinery or drink alcohol while taking this medication because it contains either a narcotic or benzodiazepines which causes drowsiness. PT verbalized understanding of these instructions.     [facility] Aware web site evaluation: I have obtained and reviewed patient utilization report from [facility] of the [facility] pharmacy database prior to writing prescription for controlled substance.  No history of abuse.    PT should follow up with PCP in 1-2 days for re-evaluation if symptoms have not improved.  Discussed red flags and reasons to return to UC or ED.  Pt and/or family verbalized understanding of diagnosis and follow up instructions and was offered informational handout on diagnosis.  PT discharged.

## 2019-01-09 DIAGNOSIS — E03.9 HYPOTHYROIDISM, UNSPECIFIED TYPE: ICD-10-CM

## 2019-01-09 RX ORDER — LEVOTHYROXINE SODIUM 0.03 MG/1
25 TABLET ORAL
Qty: 90 TAB | Refills: 1 | Status: SHIPPED | OUTPATIENT
Start: 2019-01-09 | End: 2019-07-15 | Stop reason: SDUPTHER

## 2019-01-09 NOTE — TELEPHONE ENCOUNTER
Was the patient seen in the last year in this department? Yes    Does patient have an active prescription for medications requested? No     Received Request Via: Pharmacy      Pt met protocol?: Yes, labs 3/18 tsh 4.940, ov 9/18

## 2019-01-18 DIAGNOSIS — E78.2 MIXED HYPERLIPIDEMIA: ICD-10-CM

## 2019-01-21 RX ORDER — SIMVASTATIN 20 MG
TABLET ORAL
Qty: 90 TAB | Refills: 1 | Status: SHIPPED | OUTPATIENT
Start: 2019-01-21 | End: 2019-03-29 | Stop reason: SDUPTHER

## 2019-01-21 NOTE — TELEPHONE ENCOUNTER
Pt has had OV within the 12 month protocol and lipid panel is current. 6 month supply sent to pharmacy.   Lab Results   Component Value Date/Time    CHOLSTRLTOT 142 03/12/2018 07:38 AM    LDL 70 03/12/2018 07:38 AM    HDL 59 03/12/2018 07:38 AM    TRIGLYCERIDE 67 03/12/2018 07:38 AM       Lab Results   Component Value Date/Time    SODIUM 141 03/12/2018 07:37 AM    POTASSIUM 4.3 03/12/2018 07:37 AM    CHLORIDE 104 03/12/2018 07:37 AM    CO2 31 03/12/2018 07:37 AM    GLUCOSE 94 03/12/2018 07:37 AM    BUN 11 03/12/2018 07:37 AM    CREATININE 0.65 03/12/2018 07:37 AM     Lab Results   Component Value Date/Time    ALKPHOSPHAT 62 03/12/2018 07:37 AM    ASTSGOT 17 03/12/2018 07:37 AM    ALTSGPT 14 03/12/2018 07:37 AM    TBILIRUBIN 0.9 03/12/2018 07:37 AM

## 2019-03-29 DIAGNOSIS — R60.9 PERIPHERAL EDEMA: ICD-10-CM

## 2019-03-29 DIAGNOSIS — E78.2 MIXED HYPERLIPIDEMIA: ICD-10-CM

## 2019-03-29 NOTE — TELEPHONE ENCOUNTER
Was the patient seen in the last year in this department? Yes    Does patient have an active prescription for medications requested? No     Received Request Via: Pharmacy      Pt met protocol?: NO    OV 9/18       Lab Results  Component Value Date/Time   CHOLSTRLTOT 142 03/12/2018 0738   TRIGLYCERIDE 67 03/12/2018 0738   HDL 59 03/12/2018 0738   LDL 70 03/12/2018 0738

## 2019-03-31 RX ORDER — SIMVASTATIN 20 MG
TABLET ORAL
Qty: 90 TAB | Refills: 0 | Status: SHIPPED | OUTPATIENT
Start: 2019-03-31 | End: 2019-06-19 | Stop reason: SDUPTHER

## 2019-05-30 ENCOUNTER — OFFICE VISIT (OUTPATIENT)
Dept: MEDICAL GROUP | Facility: PHYSICIAN GROUP | Age: 84
End: 2019-05-30
Payer: MEDICARE

## 2019-05-30 VITALS
SYSTOLIC BLOOD PRESSURE: 138 MMHG | BODY MASS INDEX: 33.47 KG/M2 | HEIGHT: 59 IN | DIASTOLIC BLOOD PRESSURE: 64 MMHG | HEART RATE: 70 BPM | TEMPERATURE: 97.9 F | WEIGHT: 166 LBS | OXYGEN SATURATION: 95 %

## 2019-05-30 DIAGNOSIS — E66.9 OBESITY (BMI 30-39.9): ICD-10-CM

## 2019-05-30 DIAGNOSIS — Z23 NEED FOR VACCINATION: ICD-10-CM

## 2019-05-30 DIAGNOSIS — C50.911 MALIGNANT NEOPLASM OF RIGHT FEMALE BREAST, UNSPECIFIED ESTROGEN RECEPTOR STATUS, UNSPECIFIED SITE OF BREAST (HCC): ICD-10-CM

## 2019-05-30 DIAGNOSIS — E03.8 OTHER SPECIFIED HYPOTHYROIDISM: ICD-10-CM

## 2019-05-30 DIAGNOSIS — E78.2 MIXED HYPERLIPIDEMIA: ICD-10-CM

## 2019-05-30 DIAGNOSIS — H61.23 CERUMEN DEBRIS ON TYMPANIC MEMBRANE OF BOTH EARS: ICD-10-CM

## 2019-05-30 DIAGNOSIS — E66.9 CLASS 1 OBESITY WITH BODY MASS INDEX (BMI) OF 33.0 TO 33.9 IN ADULT, UNSPECIFIED OBESITY TYPE, UNSPECIFIED WHETHER SERIOUS COMORBIDITY PRESENT: ICD-10-CM

## 2019-05-30 DIAGNOSIS — E55.9 VITAMIN D DEFICIENCY: ICD-10-CM

## 2019-05-30 PROCEDURE — 99214 OFFICE O/P EST MOD 30 MIN: CPT | Performed by: INTERNAL MEDICINE

## 2019-05-30 ASSESSMENT — PATIENT HEALTH QUESTIONNAIRE - PHQ9: CLINICAL INTERPRETATION OF PHQ2 SCORE: 0

## 2019-05-30 NOTE — PROGRESS NOTES
CC: Follow-up visit, hypothyroidism.    HISTORY OF PRESENT ILLNESS: Patient is a 83 y.o. female established patient who presents today to discuss on medical conditions as mentioned in HPI below.    Health Maintenance: Due for colonoscopy, will reprinted orders and give her which was placed in the last visit and she could not make that appointment in spite of getting multiple calls from the referral department.  All the risks understood.  Due for pneumonia vaccine which she believes she got it at release pharmacy, given instructions to submit the records.  Due for mammogram at this time which she already got it scheduled for next month.    Malignant neoplasm of right breast  This is a chronic health problem that is well controlled with current medications and lifestyle measures.  S/P surgical removal on Rt breast and also radiation therapy with prophylaxis on the left breast. Patient follows oncology Dr. Márquez, due for a mammogram next month the one done last year in June 2018 was normal.  Currently on anastrozole 1 mg daily. Recently seen him in 02/2019 and did the CBC which was normal. Visits him every 6 months, next appointment in 07/2019.    Other specified hypothyroidism  This is a chronic health problem that is well controlled with current medications and lifestyle measures.  Last TSH in March 2018 was normal, currently on levothyroxine 25 mcg nightly.    Hyperlipidemia  This is a chronic health problem that is well controlled with current medications and lifestyle measures. Last lipid panel in 03/2018 was normal. On Simvastatin 20 mg daily.      PHQ score 0, BMI > 33 , former tobacco, no fall injuries.    Patient Active Problem List    Diagnosis Date Noted   • Hx of Nonspecific abnormal electrocardiogram (ECG) (EKG) 06/03/2014     Priority: Medium   • Hyperlipidemia 02/22/2010     Priority: Low   • Obesity (BMI 30-39.9) 09/20/2018   • DDD (degenerative disc disease), lumbar 03/09/2018   • Healthcare  maintenance 03/09/2018   • Peripheral edema 03/09/2018   • Other specified hypothyroidism 02/23/2016   • Malignant neoplasm of right breast (HCC) 01/26/2016      Allergies:Patient has no known allergies.    Current Outpatient Prescriptions   Medication Sig Dispense Refill   • Calcium Carb-Cholecalciferol (CALCIUM 1000 + D PO) Take  by mouth.     • simvastatin (ZOCOR) 20 MG Tab TAKE 1 TABLET BY MOUTH  EVERY EVENING 90 Tab 0   • levothyroxine (SYNTHROID) 25 MCG Tab Take 1 Tab by mouth Every morning on an empty stomach. 90 Tab 1   • aspirin (ASA) 81 MG Chew Tab chewable tablet Take 81 mg by mouth every day.     • Cholecalciferol (VITAMIN D-3 PO) Take  by mouth.     • anastrozole (ARIMIDEX) 1 MG Tab RX by oncologist 30 Tab 0   • Multiple Vitamin (MULTI-VITAMIN DAILY PO) Take  by mouth.     • Cyanocobalamin (VITAMIN B-12) 1000 MCG TABS Take 1,000 mcg by mouth every evening.       No current facility-administered medications for this visit.        Social History   Substance Use Topics   • Smoking status: Former Smoker     Packs/day: 0.50     Years: 45.00     Types: Cigarettes     Quit date: 1/1/1998   • Smokeless tobacco: Never Used      Comment: avoid all tobacco products   • Alcohol use 0.0 oz/week      Comment: 7/year     Social History     Social History Narrative   • No narrative on file       Family History   Problem Relation Age of Onset   • Heart Disease Mother    • Other Father         ROS:     - Constitutional:  Negative for fever, chills, unexpected weight change, and fatigue/generalized weakness.    - HEENT: Positive for fullness in the ears.  Negative for headaches, vision changes, hearing changes, ear pain, ear discharge, rhinorrhea, sinus congestion, sore throat, and neck pain.      - Respiratory: Negative for cough, sputum production, chest congestion, dyspnea, wheezing, and crackles.      - Cardiovascular: Negative for chest pain, palpitations, orthopnea, and bilateral lower extremity edema.     -  "Gastrointestinal: Negative for heartburn, nausea, vomiting, abdominal pain, hematochezia, melena, diarrhea, constipation, and greasy/foul-smelling stools.     - Genitourinary: Negative for dysuria, polyuria, hematuria, pyuria, urinary urgency, and urinary incontinence.     - Musculoskeletal: Negative for myalgias, back pain, and joint pain.     - Skin: Negative for rash, itching, cyanotic skin color change.     - Neurological: Negative for dizziness, tingling, tremors, focal sensory deficit, focal weakness and headaches.     - Endo/Heme/Allergies: Does not bruise/bleed easily.     - Psychiatric/Behavioral: Negative for depression, suicidal/homicidal ideation and memory loss.        Last lab work in March 2018 reviewed and discussed with patient, did review the CBC done by oncology in February 2019.  It is scanned in the patient's chart today.      Exam:    /64 (BP Location: Right arm, Patient Position: Sitting, BP Cuff Size: Adult)   Pulse 70   Temp 36.6 °C (97.9 °F) (Temporal)   Ht 1.499 m (4' 11\")   Wt 75.3 kg (166 lb)   SpO2 95%  Body mass index is 33.53 kg/m².    General:  Well nourished, well developed female in NAD  Head is grossly normal.  Positive for bilateral cerumen more on the right side.  Patient does not want to use the eardrops at this time but would prefer ear lavage in the office today.  Neck: Supple without JVD or bruit. Thyroid is not enlarged.  Pulmonary: Clear to ausculation and percussion.  Normal effort. No rales, ronchi, or wheezing.  Cardiovascular: Regular rate and rhythm without murmur. Carotid and radial pulses are intact and equal bilaterally.  Extremities: no clubbing, cyanosis, or edema.    Please note that this dictation was created using voice recognition software. I have made every reasonable attempt to correct obvious errors, but I expect that there are errors of grammar and possibly content that I did not discover before finalizing the note.    Assessment/Plan:  1. " Vitamin D deficiency  Previous history of vitamin D deficiency in 2016, letter is been normal.  Given her anastrozole use we will recheck and replete if needed.  To continue over-the-counter vitamin D 2000 units daily.  - VITAMIN D,25 HYDROXY; Future    2. Malignant neoplasm of right female breast, unspecified estrogen receptor status, unspecified site of breast (HCC)  Stable, continue current anastrozole 1 mg daily.  Continue to follow-up with oncology Dr. Márquez as scheduled.    3. Other specified hypothyroidism  Well-controlled, continue current levothyroxine 25 mcg every morning, will check the TSH at this time and adjust the dose if needed.  Last done in March 2018 within normal limits.  - TSH WITH REFLEX TO FT4; Future    4. Obesity (BMI 30-39.9)  5. Class 1 obesity with body mass index (BMI) of 33.0 to 33.9 in adult, unspecified obesity type, unspecified whether serious comorbidity present  There was not enough time to  on this today.    6. Mixed hyperlipidemia  Well-controlled, continue current simvastatin 20 mg nightly.  Will check lipid panel at this time and also LFTs, last checked in March 2018 within normal limits.  - Comp Metabolic Panel; Future  - Lipid Profile; Future    7. Cerumen debris on tympanic membrane of both ears  - Ear Cerumen Removal

## 2019-05-30 NOTE — ASSESSMENT & PLAN NOTE
This is a chronic health problem that is well controlled with current medications and lifestyle measures. Last lipid panel in 03/2018 was normal. On Simvastatin 20 mg daily.

## 2019-05-30 NOTE — ASSESSMENT & PLAN NOTE
This is a chronic health problem that is well controlled with current medications and lifestyle measures.  S/P surgical removal on Rt breast and also radiation therapy with prophylaxis on the left breast. Patient follows oncology Dr. Márquez, due for a mammogram next month the one done last year in June 2018 was normal.  Currently on anastrozole 1 mg daily. Recently seen him in 02/2019 and did the CBC which was normal. Visits him every 6 months, next appointment in 07/2019.

## 2019-05-30 NOTE — ASSESSMENT & PLAN NOTE
This is a chronic health problem that is well controlled with current medications and lifestyle measures.  Last TSH in March 2018 was normal, currently on levothyroxine 25 mcg QAM.

## 2019-06-06 ENCOUNTER — HOSPITAL ENCOUNTER (OUTPATIENT)
Dept: LAB | Facility: MEDICAL CENTER | Age: 84
End: 2019-06-06
Attending: INTERNAL MEDICINE
Payer: MEDICARE

## 2019-06-06 DIAGNOSIS — E55.9 VITAMIN D DEFICIENCY: ICD-10-CM

## 2019-06-06 DIAGNOSIS — E03.8 OTHER SPECIFIED HYPOTHYROIDISM: ICD-10-CM

## 2019-06-06 DIAGNOSIS — E78.2 MIXED HYPERLIPIDEMIA: ICD-10-CM

## 2019-06-06 LAB
25(OH)D3 SERPL-MCNC: 41 NG/ML (ref 30–100)
ALBUMIN SERPL BCP-MCNC: 3.9 G/DL (ref 3.2–4.9)
ALBUMIN/GLOB SERPL: 1.5 G/DL
ALP SERPL-CCNC: 70 U/L (ref 30–99)
ALT SERPL-CCNC: 14 U/L (ref 2–50)
ANION GAP SERPL CALC-SCNC: 7 MMOL/L (ref 0–11.9)
AST SERPL-CCNC: 17 U/L (ref 12–45)
BILIRUB SERPL-MCNC: 1 MG/DL (ref 0.1–1.5)
BUN SERPL-MCNC: 12 MG/DL (ref 8–22)
CALCIUM SERPL-MCNC: 9.6 MG/DL (ref 8.5–10.5)
CHLORIDE SERPL-SCNC: 102 MMOL/L (ref 96–112)
CHOLEST SERPL-MCNC: 151 MG/DL (ref 100–199)
CO2 SERPL-SCNC: 31 MMOL/L (ref 20–33)
CREAT SERPL-MCNC: 0.62 MG/DL (ref 0.5–1.4)
GLOBULIN SER CALC-MCNC: 2.6 G/DL (ref 1.9–3.5)
GLUCOSE SERPL-MCNC: 88 MG/DL (ref 65–99)
HDLC SERPL-MCNC: 70 MG/DL
LDLC SERPL CALC-MCNC: 66 MG/DL
POTASSIUM SERPL-SCNC: 4 MMOL/L (ref 3.6–5.5)
PROT SERPL-MCNC: 6.5 G/DL (ref 6–8.2)
SODIUM SERPL-SCNC: 140 MMOL/L (ref 135–145)
TRIGL SERPL-MCNC: 75 MG/DL (ref 0–149)
TSH SERPL DL<=0.005 MIU/L-ACNC: 3.76 UIU/ML (ref 0.38–5.33)

## 2019-06-06 PROCEDURE — 36415 COLL VENOUS BLD VENIPUNCTURE: CPT

## 2019-06-06 PROCEDURE — 84443 ASSAY THYROID STIM HORMONE: CPT

## 2019-06-06 PROCEDURE — 80053 COMPREHEN METABOLIC PANEL: CPT

## 2019-06-06 PROCEDURE — 80061 LIPID PANEL: CPT

## 2019-06-06 PROCEDURE — 82306 VITAMIN D 25 HYDROXY: CPT

## 2019-06-19 RX ORDER — SIMVASTATIN 20 MG
TABLET ORAL
Qty: 90 TAB | Refills: 3 | Status: SHIPPED | OUTPATIENT
Start: 2019-06-19 | End: 2020-04-15

## 2019-06-19 NOTE — TELEPHONE ENCOUNTER
Pt has had OV within the 12 month protocol and lipid panel is current. 12 month supply sent to pharmacy.   Lab Results   Component Value Date/Time    CHOLSTRLTOT 151 06/06/2019 08:53 AM    LDL 66 06/06/2019 08:53 AM    HDL 70 06/06/2019 08:53 AM    TRIGLYCERIDE 75 06/06/2019 08:53 AM       Lab Results   Component Value Date/Time    SODIUM 140 06/06/2019 08:53 AM    POTASSIUM 4.0 06/06/2019 08:53 AM    CHLORIDE 102 06/06/2019 08:53 AM    CO2 31 06/06/2019 08:53 AM    GLUCOSE 88 06/06/2019 08:53 AM    BUN 12 06/06/2019 08:53 AM    CREATININE 0.62 06/06/2019 08:53 AM     Lab Results   Component Value Date/Time    ALKPHOSPHAT 70 06/06/2019 08:53 AM    ASTSGOT 17 06/06/2019 08:53 AM    ALTSGPT 14 06/06/2019 08:53 AM    TBILIRUBIN 1.0 06/06/2019 08:53 AM

## 2019-06-19 NOTE — TELEPHONE ENCOUNTER
Was the patient seen in the last year in this department? Yes    Does patient have an active prescription for medications requested? No     Received Request Via: Pharmacy      Pt met protocol?: Yes   Pt last ov 5/19   Lab Results  Component Value Date/Time   CHOLSTRLTOT 151 06/06/2019 0853   TRIGLYCERIDE 75 06/06/2019 0853   HDL 70 06/06/2019 0853   LDL 66 06/06/2019 0853       No results found for: HBA1C

## 2019-06-22 ENCOUNTER — HOSPITAL ENCOUNTER (OUTPATIENT)
Dept: RADIOLOGY | Facility: MEDICAL CENTER | Age: 84
End: 2019-06-22
Attending: INTERNAL MEDICINE
Payer: MEDICARE

## 2019-06-22 DIAGNOSIS — Z12.39 BREAST CANCER SCREENING: ICD-10-CM

## 2019-06-22 PROCEDURE — 77063 BREAST TOMOSYNTHESIS BI: CPT

## 2019-07-15 DIAGNOSIS — E03.9 HYPOTHYROIDISM, UNSPECIFIED TYPE: ICD-10-CM

## 2019-07-15 NOTE — TELEPHONE ENCOUNTER
Was the patient seen in the last year in this department? Yes    Does patient have an active prescription for medications requested? No     Received Request Via: Pharmacy      Pt met protocol?: Yes, OV 5/19, TSH checked last month (within range)

## 2019-07-16 RX ORDER — LEVOTHYROXINE SODIUM 0.03 MG/1
25 TABLET ORAL
Qty: 90 TAB | Refills: 3 | Status: SHIPPED | OUTPATIENT
Start: 2019-07-16 | End: 2020-06-02 | Stop reason: SDUPTHER

## 2019-10-11 ENCOUNTER — OFFICE VISIT (OUTPATIENT)
Dept: MEDICAL GROUP | Facility: PHYSICIAN GROUP | Age: 84
End: 2019-10-11
Payer: MEDICARE

## 2019-10-11 VITALS
HEART RATE: 82 BPM | HEIGHT: 59 IN | SYSTOLIC BLOOD PRESSURE: 170 MMHG | OXYGEN SATURATION: 93 % | TEMPERATURE: 97.6 F | DIASTOLIC BLOOD PRESSURE: 88 MMHG | BODY MASS INDEX: 32.9 KG/M2 | WEIGHT: 163.2 LBS

## 2019-10-11 DIAGNOSIS — Z23 NEED FOR VACCINATION: ICD-10-CM

## 2019-10-11 DIAGNOSIS — E03.9 HYPOTHYROIDISM, UNSPECIFIED TYPE: ICD-10-CM

## 2019-10-11 DIAGNOSIS — E66.9 OBESITY (BMI 30-39.9): ICD-10-CM

## 2019-10-11 DIAGNOSIS — C50.911 MALIGNANT NEOPLASM OF RIGHT FEMALE BREAST, UNSPECIFIED ESTROGEN RECEPTOR STATUS, UNSPECIFIED SITE OF BREAST (HCC): ICD-10-CM

## 2019-10-11 DIAGNOSIS — E66.9 CLASS 1 OBESITY WITH BODY MASS INDEX (BMI) OF 32.0 TO 32.9 IN ADULT, UNSPECIFIED OBESITY TYPE, UNSPECIFIED WHETHER SERIOUS COMORBIDITY PRESENT: ICD-10-CM

## 2019-10-11 DIAGNOSIS — E78.2 MIXED HYPERLIPIDEMIA: ICD-10-CM

## 2019-10-11 DIAGNOSIS — I10 ESSENTIAL HYPERTENSION: ICD-10-CM

## 2019-10-11 PROCEDURE — G0009 ADMIN PNEUMOCOCCAL VACCINE: HCPCS | Performed by: INTERNAL MEDICINE

## 2019-10-11 PROCEDURE — 99214 OFFICE O/P EST MOD 30 MIN: CPT | Mod: 25 | Performed by: INTERNAL MEDICINE

## 2019-10-11 PROCEDURE — 90732 PPSV23 VACC 2 YRS+ SUBQ/IM: CPT | Performed by: INTERNAL MEDICINE

## 2019-10-11 RX ORDER — CHLORTHALIDONE 25 MG/1
25 TABLET ORAL DAILY
Qty: 100 TAB | Refills: 1 | Status: SHIPPED | OUTPATIENT
Start: 2019-10-11 | End: 2020-03-23

## 2019-10-11 NOTE — ASSESSMENT & PLAN NOTE
This is a chronic health problem that is well controlled with current medications and lifestyle measures.  Recent TSH in June 2019 within normal limits, currently on levothyroxine 25 mcg QAM.

## 2019-10-11 NOTE — PROGRESS NOTES
CC: Follow-up visit, breast cancer.    HISTORY OF PRESENT ILLNESS: Patient is a 83 y.o. female established patient who presents today to discuss on medical conditions as mentioned in HPI below.    Health Maintenance: Due for Pneumovax okay to get in the office today.  Already received the flu vaccine at outside pharmacy.    Malignant neoplasm of right breast  This is a chronic health problem that is well controlled with current medications and lifestyle measures.  S/P surgical removal on Rt breast and also radiation therapy with prophylaxis on the left breast. Patient follows oncology Dr. Márquez, due for a mammogram next month the one done last year in June 2018 was normal.  Currently on anastrozole 1 mg daily. Recently seen him in 06/2019 and did the CBC which was normal. Visits him every 6 months, next appointment in 01/2020.    Hypothyroidism  This is a chronic health problem that is well controlled with current medications and lifestyle measures.  Recent TSH in June 2019 within normal limits, currently on levothyroxine 25 mcg QAM.    Hyperlipidemia  This is a chronic health problem that is well controlled with current medications and lifestyle measures.      PHQ score 0, BMI > 32 , no tobacco, no fall injuries.    Patient Active Problem List    Diagnosis Date Noted   • Hx of Nonspecific abnormal electrocardiogram (ECG) (EKG) 06/03/2014     Priority: Medium   • Hyperlipidemia 02/22/2010     Priority: Low   • Essential hypertension 10/11/2019   • Obesity (BMI 30-39.9) 09/20/2018   • DDD (degenerative disc disease), lumbar 03/09/2018   • Healthcare maintenance 03/09/2018   • Peripheral edema 03/09/2018   • Hypothyroidism 02/23/2016   • Malignant neoplasm of right breast (HCC) 01/26/2016      Allergies:Patient has no known allergies.    Current Outpatient Medications   Medication Sig Dispense Refill   • chlorthalidone (HYGROTON) 25 MG Tab Take 1 Tab by mouth every day. 100 Tab 1   • levothyroxine (SYNTHROID) 25 MCG  Tab Take 1 Tab by mouth Every morning on an empty stomach. 90 Tab 3   • simvastatin (ZOCOR) 20 MG Tab TAKE 1 TABLET BY MOUTH  EVERY EVENING 90 Tab 3   • Calcium Carb-Cholecalciferol (CALCIUM 1000 + D PO) Take  by mouth.     • aspirin (ASA) 81 MG Chew Tab chewable tablet Take 81 mg by mouth every day.     • Cholecalciferol (VITAMIN D-3 PO) Take  by mouth.     • anastrozole (ARIMIDEX) 1 MG Tab RX by oncologist 30 Tab 0   • Multiple Vitamin (MULTI-VITAMIN DAILY PO) Take  by mouth.     • Cyanocobalamin (VITAMIN B-12) 1000 MCG TABS Take 1,000 mcg by mouth every evening.       No current facility-administered medications for this visit.        Social History     Tobacco Use   • Smoking status: Former Smoker     Packs/day: 0.50     Years: 45.00     Pack years: 22.50     Types: Cigarettes     Last attempt to quit: 1998     Years since quittin.7   • Smokeless tobacco: Never Used   • Tobacco comment: avoid all tobacco products   Substance Use Topics   • Alcohol use: Yes     Alcohol/week: 0.0 oz     Comment: 7/year   • Drug use: No     Social History     Social History Narrative   • Not on file       Family History   Problem Relation Age of Onset   • Heart Disease Mother    • Other Father         ROS:     - Constitutional:  Negative for fever, chills, unexpected weight change, and fatigue/generalized weakness.    - HEENT:  Negative for headaches, vision changes, hearing changes, ear pain, ear discharge, rhinorrhea, sinus congestion, sore throat, and neck pain.      - Respiratory: Negative for cough, sputum production, chest congestion, dyspnea, wheezing, and crackles.      - Cardiovascular: Negative for chest pain, palpitations, orthopnea, and bilateral lower extremity edema.     - Gastrointestinal: Negative for heartburn, nausea, vomiting, abdominal pain, hematochezia, melena, diarrhea, constipation, and greasy/foul-smelling stools.     - Genitourinary: Negative for dysuria, polyuria, hematuria, pyuria, urinary  "urgency, and urinary incontinence.     - Musculoskeletal: Negative for myalgias, back pain, and joint pain.     - Skin: Negative for rash, itching, cyanotic skin color change.     - Neurological: Negative for dizziness, tingling, tremors, focal sensory deficit, focal weakness and headaches.     - Endo/Heme/Allergies: Does not bruise/bleed easily.     - Psychiatric/Behavioral: Negative for depression, suicidal/homicidal ideation and memory loss.      Last lab work in June 2019 reviewed and discussed with the patient.      Exam:    BP (!) 170/88 (BP Location: Right arm, Patient Position: Sitting, BP Cuff Size: Adult)   Pulse 82   Temp 36.4 °C (97.6 °F) (Temporal)   Ht 1.499 m (4' 11\")   Wt 74 kg (163 lb 3.2 oz)   SpO2 93%  Body mass index is 32.96 kg/m².    General:  Well nourished, well developed female in NAD  Head is grossly normal.  Neck: Supple without JVD or bruit. Thyroid is not enlarged.  Pulmonary: Clear to ausculation and percussion.  Normal effort. No rales, ronchi, or wheezing.  Cardiovascular: Regular rate and rhythm without murmur. Carotid and radial pulses are intact and equal bilaterally.  Positive for bilateral lower extremity edema 1+.  Extremities: no clubbing, cyanosis.    Please note that this dictation was created using voice recognition software. I have made every reasonable attempt to correct obvious errors, but I expect that there are errors of grammar and possibly content that I did not discover before finalizing the note.    Assessment/Plan:  1. Essential hypertension  New problem identified on the vital signs today, will start her on low-dose of chlorthalidone given her peripheral edema.  Given instructions to send me the BP log for next 5 days which patient understood and agreed.  - chlorthalidone (HYGROTON) 25 MG Tab; Take 1 Tab by mouth every day.  Dispense: 100 Tab; Refill: 1    2. Hypothyroidism, unspecified type  Well-controlled, continue current levothyroxine.    3. Malignant " neoplasm of right female breast, unspecified estrogen receptor status, unspecified site of breast (HCC)  Well-controlled, continue to follow Dr. Márquez oncology and continue the current anastrozole.  Recent mammogram in June 2019 within normal limits.    4. Obesity (BMI 30-39.9)  5. Class 1 obesity with body mass index (BMI) of 32.0 to 32.9 in adult, unspecified obesity type, unspecified whether serious comorbidity present  - Patient identified as having weight management issue.  Appropriate orders and counseling given.    6. Mixed hyperlipidemia  Well-controlled, continue current simvastatin.    7. Need for vaccination  - PneumoVax PPV23 =>3yo

## 2019-10-11 NOTE — ASSESSMENT & PLAN NOTE
This is a chronic health problem that is well controlled with current medications and lifestyle measures.  S/P surgical removal on Rt breast and also radiation therapy with prophylaxis on the left breast. Patient follows oncology Dr. Márquez, due for a mammogram next month the one done last year in June 2018 was normal.  Currently on anastrozole 1 mg daily. Recently seen him in 06/2019 and did the CBC which was normal. Visits him every 6 months, next appointment in 01/2020.

## 2020-03-19 DIAGNOSIS — I10 ESSENTIAL HYPERTENSION: ICD-10-CM

## 2020-03-23 RX ORDER — CHLORTHALIDONE 25 MG/1
TABLET ORAL
Qty: 100 TAB | Refills: 1 | Status: SHIPPED | OUTPATIENT
Start: 2020-03-23 | End: 2020-04-15 | Stop reason: SDUPTHER

## 2020-03-23 NOTE — TELEPHONE ENCOUNTER
Refill X 6 months, sent to pharmacy.Pt. Seen in the last 6 months per protocol.   Lab Results   Component Value Date/Time    SODIUM 140 06/06/2019 08:53 AM    POTASSIUM 4.0 06/06/2019 08:53 AM    CHLORIDE 102 06/06/2019 08:53 AM    CO2 31 06/06/2019 08:53 AM    GLUCOSE 88 06/06/2019 08:53 AM    BUN 12 06/06/2019 08:53 AM    CREATININE 0.62 06/06/2019 08:53 AM

## 2020-04-07 ENCOUNTER — OFFICE VISIT (OUTPATIENT)
Dept: MEDICAL GROUP | Facility: PHYSICIAN GROUP | Age: 85
End: 2020-04-07
Payer: MEDICARE

## 2020-04-07 ENCOUNTER — HOSPITAL ENCOUNTER (OUTPATIENT)
Dept: LAB | Facility: MEDICAL CENTER | Age: 85
End: 2020-04-07
Attending: FAMILY MEDICINE
Payer: MEDICARE

## 2020-04-07 VITALS
WEIGHT: 160.4 LBS | HEIGHT: 59 IN | DIASTOLIC BLOOD PRESSURE: 72 MMHG | BODY MASS INDEX: 32.34 KG/M2 | OXYGEN SATURATION: 96 % | TEMPERATURE: 98.6 F | SYSTOLIC BLOOD PRESSURE: 122 MMHG | HEART RATE: 78 BPM

## 2020-04-07 DIAGNOSIS — C50.911 MALIGNANT NEOPLASM OF RIGHT FEMALE BREAST, UNSPECIFIED ESTROGEN RECEPTOR STATUS, UNSPECIFIED SITE OF BREAST (HCC): ICD-10-CM

## 2020-04-07 DIAGNOSIS — I10 ESSENTIAL HYPERTENSION: ICD-10-CM

## 2020-04-07 DIAGNOSIS — E03.9 HYPOTHYROIDISM, UNSPECIFIED TYPE: ICD-10-CM

## 2020-04-07 DIAGNOSIS — M85.89 OSTEOPENIA OF MULTIPLE SITES: ICD-10-CM

## 2020-04-07 DIAGNOSIS — Z12.11 SCREENING FOR COLORECTAL CANCER: ICD-10-CM

## 2020-04-07 DIAGNOSIS — Z12.12 SCREENING FOR COLORECTAL CANCER: ICD-10-CM

## 2020-04-07 DIAGNOSIS — Z91.89 AT HIGH RISK FOR OSTEOPOROSIS: ICD-10-CM

## 2020-04-07 DIAGNOSIS — E78.2 MIXED HYPERLIPIDEMIA: ICD-10-CM

## 2020-04-07 LAB
25(OH)D3 SERPL-MCNC: 61 NG/ML (ref 30–100)
ALBUMIN SERPL BCP-MCNC: 4 G/DL (ref 3.2–4.9)
ALBUMIN/GLOB SERPL: 1.3 G/DL
ALP SERPL-CCNC: 69 U/L (ref 30–99)
ALT SERPL-CCNC: 14 U/L (ref 2–50)
ANION GAP SERPL CALC-SCNC: 9 MMOL/L (ref 7–16)
AST SERPL-CCNC: 17 U/L (ref 12–45)
BILIRUB SERPL-MCNC: 0.7 MG/DL (ref 0.1–1.5)
BUN SERPL-MCNC: 18 MG/DL (ref 8–22)
CALCIUM SERPL-MCNC: 9.2 MG/DL (ref 8.5–10.5)
CHLORIDE SERPL-SCNC: 98 MMOL/L (ref 96–112)
CHOLEST SERPL-MCNC: 168 MG/DL (ref 100–199)
CO2 SERPL-SCNC: 28 MMOL/L (ref 20–33)
CREAT SERPL-MCNC: 0.59 MG/DL (ref 0.5–1.4)
FASTING STATUS PATIENT QL REPORTED: NORMAL
GLOBULIN SER CALC-MCNC: 3 G/DL (ref 1.9–3.5)
GLUCOSE SERPL-MCNC: 104 MG/DL (ref 65–99)
HDLC SERPL-MCNC: 66 MG/DL
LDLC SERPL CALC-MCNC: 83 MG/DL
POTASSIUM SERPL-SCNC: 3.9 MMOL/L (ref 3.6–5.5)
PROT SERPL-MCNC: 7 G/DL (ref 6–8.2)
SODIUM SERPL-SCNC: 135 MMOL/L (ref 135–145)
TRIGL SERPL-MCNC: 95 MG/DL (ref 0–149)
TSH SERPL DL<=0.005 MIU/L-ACNC: 3.34 UIU/ML (ref 0.38–5.33)

## 2020-04-07 PROCEDURE — 80061 LIPID PANEL: CPT

## 2020-04-07 PROCEDURE — 80053 COMPREHEN METABOLIC PANEL: CPT

## 2020-04-07 PROCEDURE — 82306 VITAMIN D 25 HYDROXY: CPT | Mod: GA

## 2020-04-07 PROCEDURE — 84443 ASSAY THYROID STIM HORMONE: CPT

## 2020-04-07 PROCEDURE — 99203 OFFICE O/P NEW LOW 30 MIN: CPT | Performed by: FAMILY MEDICINE

## 2020-04-07 PROCEDURE — 36415 COLL VENOUS BLD VENIPUNCTURE: CPT

## 2020-04-07 RX ORDER — ALENDRONATE SODIUM 70 MG/1
TABLET ORAL
COMMUNITY
Start: 2020-03-10 | End: 2020-04-07

## 2020-04-07 ASSESSMENT — PATIENT HEALTH QUESTIONNAIRE - PHQ9: CLINICAL INTERPRETATION OF PHQ2 SCORE: 0

## 2020-04-07 NOTE — PROGRESS NOTES
"cc: osteopenia      Subjective:     Corrinne Alice Carmignani is a 84 y.o. female presenting for the following:     History of breast cancer: patient has been taking anastrozole daily and does see oncology. She was then placed on alendronate a few weeks ago. But unfortunately, she did develop acid reflux.  She had some nausea and some mild burning with urination.  She did stay sitting up after taking this but it was not helpful.    Patient does have history of hypothyroidism.  She is taking low-dose Synthroid daily.  She has never been on a higher dose of this that she remember. Denies feeling hot/cold, constipation/diarrhea, palpitations, racing heart, weight gain/loss, or thinning hair.      Review of systems:  All others reviewed and are negative.       Current Outpatient Medications:   •  chlorthalidone (HYGROTON) 25 MG Tab, TAKE ONE TABLET BY MOUTH EVERY DAY, Disp: 100 Tab, Rfl: 1  •  levothyroxine (SYNTHROID) 25 MCG Tab, Take 1 Tab by mouth Every morning on an empty stomach., Disp: 90 Tab, Rfl: 3  •  simvastatin (ZOCOR) 20 MG Tab, TAKE 1 TABLET BY MOUTH  EVERY EVENING, Disp: 90 Tab, Rfl: 3  •  Calcium Carb-Cholecalciferol (CALCIUM 1000 + D PO), Take  by mouth., Disp: , Rfl:   •  aspirin (ASA) 81 MG Chew Tab chewable tablet, Take 81 mg by mouth every day., Disp: , Rfl:   •  Cholecalciferol (VITAMIN D-3 PO), Take  by mouth., Disp: , Rfl:   •  anastrozole (ARIMIDEX) 1 MG Tab, RX by oncologist, Disp: 30 Tab, Rfl: 0  •  Multiple Vitamin (MULTI-VITAMIN DAILY PO), Take  by mouth., Disp: , Rfl:   •  Cyanocobalamin (VITAMIN B-12) 1000 MCG TABS, Take 1,000 mcg by mouth every evening., Disp: , Rfl:     Allergies, past medical history, past surgical history, family history, social history reviewed and updated    Objective:     Vitals: /72 (BP Location: Right arm, Patient Position: Sitting, BP Cuff Size: Adult)   Pulse 78   Temp 37 °C (98.6 °F) (Temporal)   Ht 1.499 m (4' 11\")   Wt 72.8 kg (160 lb 6.4 oz)   SpO2 " 96%   BMI 32.40 kg/m²   General: Alert, pleasant, NAD  HEENT: Normocephalic.   EOMI, no icterus or pallor.  Conjunctivae and lids normal. External ears and nose normal. Oropharynx non-erythematous, mucous membranes moist.    Neck supple.  No thyromegaly or masses palpated. No cervical or supraclavicular lymphadenopathy.  Heart: Regular rate and rhythm.  S1 and S2 normal.  No murmurs appreciated.  Respiratory: Normal respiratory effort.  Clear to auscultation bilaterally.  Abdomen: Non-distended, soft  Skin: Warm, dry, no rashes in exposed areas.  Musculoskeletal: Gait is normal.  Moves all extremities well.  Extremities: No leg edema.    Neurological: sensation grossly intact,  tone/strength normal, gait is normal, CN2-12 grossly intact  Psych:  Affect is normal, judgement is good, grooming is appropriate.    Assessment/Plan:     Corrinne was seen today for establish care.    Diagnoses and all orders for this visit:    Hypothyroidism, unspecified type: We will check TSH to ensure this is well controlled.  -     TSH WITH REFLEX TO FT4; Future    Essential hypertension well-controlled chronic problem.  Will check potassium levels and renal function.  -     Comp Metabolic Panel; Future      Patient is at high risk for osteoporosis and with known osteopenia as she is on anastrozole for history of breast cancer.  Patient did not tolerate oral alendronate and she is wondering about the absolute necessity of this medication, she would like to avoid any medications she does not need.  Will check vitamin D level and a DEXA scan to further evaluate this.  Malignant neoplasm of right female breast, unspecified estrogen receptor status, unspecified site of breast (HCC)  -     VITAMIN D,25 HYDROXY; Future  At high risk for osteoporosis  -     VITAMIN D,25 HYDROXY; Future  -     DS-BONE DENSITY STUDY (DEXA); Future  Osteopenia of multiple sites  -     DS-BONE DENSITY STUDY (DEXA); Future    Mixed hyperlipidemia  -     Lipid  Profile; Future    Screening for colorectal cancer  -     REFERRAL TO GI FOR COLONOSCOPY    Return in about 3 months (around 7/7/2020), or if symptoms worsen or fail to improve.

## 2020-04-15 DIAGNOSIS — I10 ESSENTIAL HYPERTENSION: ICD-10-CM

## 2020-04-15 RX ORDER — CHLORTHALIDONE 25 MG/1
25 TABLET ORAL
Qty: 100 TAB | Refills: 1 | Status: SHIPPED | OUTPATIENT
Start: 2020-04-15 | End: 2020-09-01 | Stop reason: SDUPTHER

## 2020-04-15 RX ORDER — SIMVASTATIN 20 MG
TABLET ORAL
Qty: 90 TAB | Refills: 3 | Status: SHIPPED | OUTPATIENT
Start: 2020-04-15 | End: 2021-04-05

## 2020-04-15 NOTE — TELEPHONE ENCOUNTER
Pt has had OV within the 12 month protocol and lipid panel is current. 6 month supply sent to pharmacy.   Lab Results   Component Value Date/Time    CHOLSTRLTOT 168 04/07/2020 10:42 AM    LDL 83 04/07/2020 10:42 AM    HDL 66 04/07/2020 10:42 AM    TRIGLYCERIDE 95 04/07/2020 10:42 AM       Lab Results   Component Value Date/Time    SODIUM 135 04/07/2020 10:42 AM    POTASSIUM 3.9 04/07/2020 10:42 AM    CHLORIDE 98 04/07/2020 10:42 AM    CO2 28 04/07/2020 10:42 AM    GLUCOSE 104 (H) 04/07/2020 10:42 AM    BUN 18 04/07/2020 10:42 AM    CREATININE 0.59 04/07/2020 10:42 AM     Lab Results   Component Value Date/Time    ALKPHOSPHAT 69 04/07/2020 10:42 AM    ASTSGOT 17 04/07/2020 10:42 AM    ALTSGPT 14 04/07/2020 10:42 AM    TBILIRUBIN 0.7 04/07/2020 10:42 AM

## 2020-04-15 NOTE — TELEPHONE ENCOUNTER
Was the patient seen in the last year in this department? Yes    Does patient have an active prescription for medications requested? Yes    Received Request Via: Pharmacy      Pt met protocol?: Yes, ov 4/7/20 bp 122/72 send to mail order

## 2020-05-21 ENCOUNTER — HOSPITAL ENCOUNTER (OUTPATIENT)
Dept: LAB | Facility: MEDICAL CENTER | Age: 85
End: 2020-05-21
Attending: NURSE PRACTITIONER
Payer: MEDICARE

## 2020-05-21 PROCEDURE — 80053 COMPREHEN METABOLIC PANEL: CPT

## 2020-05-21 PROCEDURE — 84100 ASSAY OF PHOSPHORUS: CPT

## 2020-05-21 PROCEDURE — 82306 VITAMIN D 25 HYDROXY: CPT

## 2020-05-21 PROCEDURE — 83735 ASSAY OF MAGNESIUM: CPT

## 2020-05-22 LAB
25(OH)D3 SERPL-MCNC: 57 NG/ML (ref 30–100)
ALBUMIN SERPL BCP-MCNC: 4.3 G/DL (ref 3.2–4.9)
ALBUMIN/GLOB SERPL: 2 G/DL
ALP SERPL-CCNC: 62 U/L (ref 30–99)
ALT SERPL-CCNC: 16 U/L (ref 2–50)
ANION GAP SERPL CALC-SCNC: 12 MMOL/L (ref 7–16)
AST SERPL-CCNC: 16 U/L (ref 12–45)
BILIRUB SERPL-MCNC: 0.8 MG/DL (ref 0.1–1.5)
BUN SERPL-MCNC: 17 MG/DL (ref 8–22)
CALCIUM SERPL-MCNC: 9.3 MG/DL (ref 8.5–10.5)
CHLORIDE SERPL-SCNC: 100 MMOL/L (ref 96–112)
CO2 SERPL-SCNC: 29 MMOL/L (ref 20–33)
CREAT SERPL-MCNC: 0.65 MG/DL (ref 0.5–1.4)
GLOBULIN SER CALC-MCNC: 2.1 G/DL (ref 1.9–3.5)
GLUCOSE SERPL-MCNC: 168 MG/DL (ref 65–99)
MAGNESIUM SERPL-MCNC: 1.7 MG/DL (ref 1.5–2.5)
PHOSPHATE SERPL-MCNC: 3.4 MG/DL (ref 2.5–4.5)
POTASSIUM SERPL-SCNC: 3.6 MMOL/L (ref 3.6–5.5)
PROT SERPL-MCNC: 6.4 G/DL (ref 6–8.2)
SODIUM SERPL-SCNC: 141 MMOL/L (ref 135–145)

## 2020-06-02 DIAGNOSIS — E03.9 HYPOTHYROIDISM, UNSPECIFIED TYPE: ICD-10-CM

## 2020-06-04 RX ORDER — LEVOTHYROXINE SODIUM 0.03 MG/1
25 TABLET ORAL
Qty: 90 TAB | Refills: 1 | Status: SHIPPED | OUTPATIENT
Start: 2020-06-04 | End: 2020-10-13 | Stop reason: SDUPTHER

## 2020-06-04 NOTE — TELEPHONE ENCOUNTER
Last seen by PCP 04/7/2020. Will send 9 month(s) to the pharmacy.    TSH   Date Value Ref Range Status   04/07/2020 3.340 0.380 - 5.330 uIU/mL Final     Comment:     Please note new reference ranges effective 12/14/2017 10:00 AM  Pregnant Females, 1st Trimester  0.050-3.700  Pregnant Females, 2nd Trimester  0.310-4.350  Pregnant Females, 3rd Trimester  0.410-5.180

## 2020-09-01 DIAGNOSIS — I10 ESSENTIAL HYPERTENSION: ICD-10-CM

## 2020-09-01 RX ORDER — CHLORTHALIDONE 25 MG/1
25 TABLET ORAL
Qty: 100 TAB | Refills: 0 | Status: SHIPPED | OUTPATIENT
Start: 2020-09-01 | End: 2020-09-15 | Stop reason: SDUPTHER

## 2020-09-01 NOTE — TELEPHONE ENCOUNTER
Patient states that mail order pharmacy received a cancellation notice on the Chlorthalidone, she states changes were never discussed. Pended 1 refill below.  Patient has establishing appointment on 9/29/2020.

## 2020-09-10 ENCOUNTER — HOSPITAL ENCOUNTER (OUTPATIENT)
Dept: RADIOLOGY | Facility: MEDICAL CENTER | Age: 85
End: 2020-09-10
Attending: FAMILY MEDICINE
Payer: MEDICARE

## 2020-09-10 ENCOUNTER — HOSPITAL ENCOUNTER (OUTPATIENT)
Dept: RADIOLOGY | Facility: MEDICAL CENTER | Age: 85
End: 2020-09-10
Attending: INTERNAL MEDICINE
Payer: MEDICARE

## 2020-09-10 DIAGNOSIS — Z12.31 VISIT FOR SCREENING MAMMOGRAM: ICD-10-CM

## 2020-09-10 DIAGNOSIS — Z91.89 AT HIGH RISK FOR OSTEOPOROSIS: ICD-10-CM

## 2020-09-10 DIAGNOSIS — C50.511 MALIGNANT NEOPLASM OF LOWER-OUTER QUADRANT OF RIGHT FEMALE BREAST, UNSPECIFIED ESTROGEN RECEPTOR STATUS (HCC): ICD-10-CM

## 2020-09-10 DIAGNOSIS — M85.89 OSTEOPENIA OF MULTIPLE SITES: ICD-10-CM

## 2020-09-10 PROCEDURE — 77080 DXA BONE DENSITY AXIAL: CPT

## 2020-09-10 PROCEDURE — 77067 SCR MAMMO BI INCL CAD: CPT

## 2020-09-15 DIAGNOSIS — I10 ESSENTIAL HYPERTENSION: ICD-10-CM

## 2020-09-15 RX ORDER — CHLORTHALIDONE 25 MG/1
25 TABLET ORAL
Qty: 100 TAB | Refills: 0 | Status: SHIPPED | OUTPATIENT
Start: 2020-09-15 | End: 2020-11-09

## 2020-09-29 ENCOUNTER — OFFICE VISIT (OUTPATIENT)
Dept: MEDICAL GROUP | Facility: PHYSICIAN GROUP | Age: 85
End: 2020-09-29
Payer: MEDICARE

## 2020-09-29 ENCOUNTER — HOSPITAL ENCOUNTER (OUTPATIENT)
Dept: LAB | Facility: MEDICAL CENTER | Age: 85
End: 2020-09-29
Attending: FAMILY MEDICINE
Payer: MEDICARE

## 2020-09-29 VITALS
BODY MASS INDEX: 32.46 KG/M2 | SYSTOLIC BLOOD PRESSURE: 128 MMHG | HEART RATE: 83 BPM | OXYGEN SATURATION: 94 % | TEMPERATURE: 97.5 F | WEIGHT: 161 LBS | DIASTOLIC BLOOD PRESSURE: 64 MMHG | HEIGHT: 59 IN

## 2020-09-29 DIAGNOSIS — R73.01 ELEVATED FASTING BLOOD SUGAR: ICD-10-CM

## 2020-09-29 DIAGNOSIS — E03.9 HYPOTHYROIDISM, UNSPECIFIED TYPE: ICD-10-CM

## 2020-09-29 DIAGNOSIS — M85.89 OSTEOPENIA OF MULTIPLE SITES: ICD-10-CM

## 2020-09-29 DIAGNOSIS — I10 ESSENTIAL HYPERTENSION: ICD-10-CM

## 2020-09-29 DIAGNOSIS — C50.911 MALIGNANT NEOPLASM OF RIGHT FEMALE BREAST, UNSPECIFIED ESTROGEN RECEPTOR STATUS, UNSPECIFIED SITE OF BREAST (HCC): ICD-10-CM

## 2020-09-29 PROBLEM — Z00.00 HEALTHCARE MAINTENANCE: Status: RESOLVED | Noted: 2018-03-09 | Resolved: 2020-09-29

## 2020-09-29 LAB
ALBUMIN SERPL BCP-MCNC: 4.1 G/DL (ref 3.2–4.9)
ALBUMIN/GLOB SERPL: 1.6 G/DL
ALP SERPL-CCNC: 61 U/L (ref 30–99)
ALT SERPL-CCNC: 16 U/L (ref 2–50)
ANION GAP SERPL CALC-SCNC: 10 MMOL/L (ref 7–16)
AST SERPL-CCNC: 13 U/L (ref 12–45)
BILIRUB SERPL-MCNC: 0.8 MG/DL (ref 0.1–1.5)
BUN SERPL-MCNC: 17 MG/DL (ref 8–22)
CALCIUM SERPL-MCNC: 9.7 MG/DL (ref 8.5–10.5)
CHLORIDE SERPL-SCNC: 98 MMOL/L (ref 96–112)
CO2 SERPL-SCNC: 30 MMOL/L (ref 20–33)
CREAT SERPL-MCNC: 0.65 MG/DL (ref 0.5–1.4)
EST. AVERAGE GLUCOSE BLD GHB EST-MCNC: 117 MG/DL
GLOBULIN SER CALC-MCNC: 2.6 G/DL (ref 1.9–3.5)
GLUCOSE SERPL-MCNC: 96 MG/DL (ref 65–99)
HBA1C MFR BLD: 5.7 % (ref 0–5.6)
POTASSIUM SERPL-SCNC: 3.7 MMOL/L (ref 3.6–5.5)
PROT SERPL-MCNC: 6.7 G/DL (ref 6–8.2)
SODIUM SERPL-SCNC: 138 MMOL/L (ref 135–145)

## 2020-09-29 PROCEDURE — 36415 COLL VENOUS BLD VENIPUNCTURE: CPT | Mod: GA

## 2020-09-29 PROCEDURE — 83036 HEMOGLOBIN GLYCOSYLATED A1C: CPT | Mod: GA

## 2020-09-29 PROCEDURE — 99214 OFFICE O/P EST MOD 30 MIN: CPT | Performed by: FAMILY MEDICINE

## 2020-09-29 PROCEDURE — 80053 COMPREHEN METABOLIC PANEL: CPT

## 2020-09-29 NOTE — PROGRESS NOTES
CC: Elevated sugar    HISTORY OF THE PRESENT ILLNESS: Patient is a 84 y.o. female. This pleasant patient is here today to discuss following issues and establish care.    Patient is here today primarily to establish care.  She does note that her biggest concern today is her recent sugars.  She had recent fasting sugar which was elevated in April at 104.  Had recent nonfasting CMP per her oncologist and sugar was at 168.  She reports she is never had issues with her sugar in the past but reports concern.  She feels it may be related to her Prolia which she takes for her osteopenia.    Patient also wants to review her most recent DEXA scan.  It did show osteopenia with increased risk of fracture in the femur.  She does have a history of breast cancer and is on Arimidex.  Per her oncologist she is already on Prolia.  She also reports she takes a vitamin D supplement and a calcium supplement daily.    Has known hypertension.  Currently well controlled on chlorthalidone only.  This medication was started by another PCP, unclear why this is her antihypertensive.  Recent potassium and renal function within normal limits.    Allergies: Fosamax [alendronate sodium]    Current Outpatient Medications Ordered in Epic   Medication Sig Dispense Refill   • chlorthalidone (HYGROTON) 25 MG Tab Take 1 Tab by mouth every day. 100 Tab 0   • levothyroxine (SYNTHROID) 25 MCG Tab Take 1 Tab by mouth Every morning on an empty stomach. 90 Tab 1   • simvastatin (ZOCOR) 20 MG Tab TAKE 1 TABLET BY MOUTH  EVERY EVENING 90 Tab 3   • Calcium Carb-Cholecalciferol (CALCIUM 1000 + D PO) Take  by mouth.     • aspirin (ASA) 81 MG Chew Tab chewable tablet Take 81 mg by mouth every day.     • Cholecalciferol (VITAMIN D-3 PO) Take  by mouth.     • anastrozole (ARIMIDEX) 1 MG Tab RX by oncologist 30 Tab 0   • Multiple Vitamin (MULTI-VITAMIN DAILY PO) Take  by mouth.     • Cyanocobalamin (VITAMIN B-12) 1000 MCG TABS Take 1,000 mcg by mouth every evening.        No current Epic-ordered facility-administered medications on file.        Past Medical History:   Diagnosis Date   • Arthritis     shoulder   • Back pain    • Cancer (HCC)    • Cataract     genna IOLI   • DDD (degenerative disc disease), lumbar 3/9/2018   • Dental disorder     lower partial   • Healthcare maintenance 3/9/2018   • Hyperlipidemia    • Hypothyroid    • Lumbar spondylosis     with intermittent radiculopathy   • Peripheral edema 3/9/2018   • Snoring     sleep study done, negative, years ago       Past Surgical History:   Procedure Laterality Date   • MASTECTOMY Right 2016    Procedure: MASTECTOMY partial  ;  Surgeon: Rosy Parry M.D.;  Location: SURGERY SAME DAY [facility];  Service:    • NODE BIOPSY SENTINEL Right 2016    Procedure: NODE BIOPSY SENTINEL Lymph  ;  Surgeon: Rosy Parry M.D.;  Location: SURGERY SAME DAY [facility];  Service:    • ABDOMINAL HYSTERECTOMY TOTAL      non-cancer   • OOPHORECTOMY      tumor   • OTHER  ,     cataract IOLI genna   • US-NEEDLE CORE BX-BREAST PANEL         Social History     Tobacco Use   • Smoking status: Former Smoker     Packs/day: 0.50     Years: 45.00     Pack years: 22.50     Types: Cigarettes     Quit date: 1998     Years since quittin.7   • Smokeless tobacco: Never Used   • Tobacco comment: avoid all tobacco products   Substance Use Topics   • Alcohol use: Yes     Alcohol/week: 0.0 oz     Comment: 7/year   • Drug use: No       Social History     Social History Narrative   • Not on file       Family History   Problem Relation Age of Onset   • Heart Disease Mother    • Other Father        ROS:     - Constitutional: Negative for fever, chills, unexpected weight change, and fatigue/generalized weakness.     - Respiratory: Negative for cough, sputum production, chest congestion, dyspnea, wheezing, and crackles.      - Cardiovascular: Negative for chest pain, palpitations, orthopnea, PND, and bilateral lower extremity edema.  "    Labs: Labs reviewed from April 7, 2020 and May 21, 2020 and questions answered with patient.    Exam: /64 (BP Location: Left arm, Patient Position: Sitting, BP Cuff Size: Adult)   Pulse 83   Temp 36.4 °C (97.5 °F) (Temporal)   Ht 1.499 m (4' 11\")   Wt 73 kg (161 lb)   SpO2 94%  Body mass index is 32.52 kg/m².    General: Well appearing, NAD  Pulmonary: Clear to ausculation.  Normal effort. No rales, ronchi, or wheezing.  Cardiovascular: Regular rate and rhythm without murmur, rubs or gallop.   Skin: Warm and dry.  No obvious lesions.  Musculoskeletal:  No extremity cyanosis, clubbing, or edema.  Psych: Normal mood and affect. Alert and oriented. Judgment and insight is normal.    Please note that this dictation was created using voice recognition software. I have made every reasonable attempt to correct obvious errors, but I expect that there are errors of grammar and possibly content that I did not discover before finalizing the note.      Assessment/Plan  Corrinne was seen today for establish care.    Diagnoses and all orders for this visit:    Elevated fasting blood sugar  Noted on recent labs.  Patient is quite concerned and wants to get it rechecked.  Recommend checking A1c as well which has been ordered today.  -     Comp Metabolic Panel; Future  -     HEMOGLOBIN A1C; Future    Osteopenia of multiple sites  Chronic, known issue.  DEXA scan reviewed with patient today.  Recommend continuing current regimen as per HPI.    Malignant neoplasm of right female breast, unspecified estrogen receptor status, unspecified site of breast (HCC)  Previous issue for the patient.  She reports she is doing well and continues to follow with Dr. Márquez in oncology.  On Arimidex.  Up-to-date on mammogram.    Hypothyroidism, unspecified type  Chronic, well controlled problem.  Continue current dose of levothyroxine.  TSH normal in April 2020.    Essential hypertension  Chronic well-controlled problem.  Continue " current medications.    Follow-up in about 6 months or sooner if needed.    Olga Espinoza DO  [facility]

## 2020-10-10 DIAGNOSIS — I10 ESSENTIAL HYPERTENSION: ICD-10-CM

## 2020-10-12 RX ORDER — CHLORTHALIDONE 25 MG/1
TABLET ORAL
Qty: 90 TAB | Refills: 3 | OUTPATIENT
Start: 2020-10-12

## 2020-10-13 DIAGNOSIS — E03.9 HYPOTHYROIDISM, UNSPECIFIED TYPE: ICD-10-CM

## 2020-10-13 RX ORDER — LEVOTHYROXINE SODIUM 0.03 MG/1
25 TABLET ORAL
Qty: 90 TAB | Refills: 1 | Status: SHIPPED | OUTPATIENT
Start: 2020-10-13 | End: 2020-12-11 | Stop reason: SDUPTHER

## 2020-11-05 DIAGNOSIS — I10 ESSENTIAL HYPERTENSION: ICD-10-CM

## 2020-11-09 RX ORDER — CHLORTHALIDONE 25 MG/1
TABLET ORAL
Qty: 90 TAB | Refills: 1 | Status: SHIPPED | OUTPATIENT
Start: 2020-11-09 | End: 2021-04-19

## 2020-11-09 NOTE — TELEPHONE ENCOUNTER
Refill X 6 months, sent to pharmacy.Pt. Seen in the last 6 months per protocol.   Lab Results   Component Value Date/Time    SODIUM 138 09/29/2020 10:03 AM    POTASSIUM 3.7 09/29/2020 10:03 AM    CHLORIDE 98 09/29/2020 10:03 AM    CO2 30 09/29/2020 10:03 AM    GLUCOSE 96 09/29/2020 10:03 AM    BUN 17 09/29/2020 10:03 AM    CREATININE 0.65 09/29/2020 10:03 AM

## 2020-11-11 ENCOUNTER — HOSPITAL ENCOUNTER (OUTPATIENT)
Dept: RADIOLOGY | Facility: MEDICAL CENTER | Age: 85
End: 2020-11-11
Attending: SPECIALIST
Payer: MEDICARE

## 2020-11-11 DIAGNOSIS — M54.50 LUMBAR PAIN: ICD-10-CM

## 2020-11-11 PROCEDURE — 73502 X-RAY EXAM HIP UNI 2-3 VIEWS: CPT | Mod: RT

## 2020-11-11 PROCEDURE — 72100 X-RAY EXAM L-S SPINE 2/3 VWS: CPT

## 2020-11-20 ENCOUNTER — HOSPITAL ENCOUNTER (OUTPATIENT)
Dept: RADIOLOGY | Facility: MEDICAL CENTER | Age: 85
End: 2020-11-20
Attending: SPECIALIST
Payer: MEDICARE

## 2020-11-20 DIAGNOSIS — M54.50 LOW BACK PAIN, UNSPECIFIED BACK PAIN LATERALITY, UNSPECIFIED CHRONICITY, UNSPECIFIED WHETHER SCIATICA PRESENT: ICD-10-CM

## 2020-11-20 PROCEDURE — 700117 HCHG RX CONTRAST REV CODE 255: Performed by: SPECIALIST

## 2020-11-20 PROCEDURE — 72194 CT PELVIS W/O & W/DYE: CPT

## 2020-11-20 RX ADMIN — IOHEXOL 100 ML: 350 INJECTION, SOLUTION INTRAVENOUS at 15:28

## 2020-12-07 ENCOUNTER — TELEPHONE (OUTPATIENT)
Dept: MEDICAL GROUP | Facility: PHYSICIAN GROUP | Age: 85
End: 2020-12-07

## 2020-12-07 NOTE — TELEPHONE ENCOUNTER
Patient had a CT, and a couple Xrays ordered by Dr Benitez. She wanted you to take a look at the results when you get a chance. It sounds like she has some calcification in her abdominal aorta.

## 2020-12-11 DIAGNOSIS — E03.9 HYPOTHYROIDISM, UNSPECIFIED TYPE: ICD-10-CM

## 2020-12-11 RX ORDER — LEVOTHYROXINE SODIUM 0.03 MG/1
25 TABLET ORAL
Qty: 90 TAB | Refills: 3 | Status: SHIPPED | OUTPATIENT
Start: 2020-12-11 | End: 2021-09-24

## 2021-01-11 DIAGNOSIS — Z23 NEED FOR VACCINATION: ICD-10-CM

## 2021-01-28 ENCOUNTER — IMMUNIZATION (OUTPATIENT)
Dept: FAMILY PLANNING/WOMEN'S HEALTH CLINIC | Facility: IMMUNIZATION CENTER | Age: 86
End: 2021-01-28
Attending: INTERNAL MEDICINE
Payer: MEDICARE

## 2021-01-28 DIAGNOSIS — Z23 NEED FOR VACCINATION: ICD-10-CM

## 2021-01-28 DIAGNOSIS — Z23 ENCOUNTER FOR VACCINATION: Primary | ICD-10-CM

## 2021-01-28 PROCEDURE — 0011A MODERNA SARS-COV-2 VACCINE: CPT | Performed by: INTERNAL MEDICINE

## 2021-01-28 PROCEDURE — 91301 MODERNA SARS-COV-2 VACCINE: CPT | Performed by: INTERNAL MEDICINE

## 2021-03-05 ENCOUNTER — IMMUNIZATION (OUTPATIENT)
Dept: FAMILY PLANNING/WOMEN'S HEALTH CLINIC | Facility: IMMUNIZATION CENTER | Age: 86
End: 2021-03-05
Attending: INTERNAL MEDICINE
Payer: MEDICARE

## 2021-03-05 DIAGNOSIS — Z23 ENCOUNTER FOR VACCINATION: Primary | ICD-10-CM

## 2021-03-05 PROCEDURE — 91301 MODERNA SARS-COV-2 VACCINE: CPT | Performed by: INTERNAL MEDICINE

## 2021-03-05 PROCEDURE — 0012A MODERNA SARS-COV-2 VACCINE: CPT | Performed by: INTERNAL MEDICINE

## 2021-04-05 RX ORDER — SIMVASTATIN 20 MG
TABLET ORAL
Qty: 90 TABLET | Refills: 1 | Status: SHIPPED | OUTPATIENT
Start: 2021-04-05 | End: 2021-09-17

## 2021-04-05 NOTE — TELEPHONE ENCOUNTER
Pt has had OV within the 12 month protocol and lipid panel is current. 6 month supply sent to pharmacy.   Lab Results   Component Value Date/Time    CHOLSTRLTOT 168 04/07/2020 10:42 AM    LDL 83 04/07/2020 10:42 AM    HDL 66 04/07/2020 10:42 AM    TRIGLYCERIDE 95 04/07/2020 10:42 AM       Lab Results   Component Value Date/Time    SODIUM 138 09/29/2020 10:03 AM    POTASSIUM 3.7 09/29/2020 10:03 AM    CHLORIDE 98 09/29/2020 10:03 AM    CO2 30 09/29/2020 10:03 AM    GLUCOSE 96 09/29/2020 10:03 AM    BUN 17 09/29/2020 10:03 AM    CREATININE 0.65 09/29/2020 10:03 AM     Lab Results   Component Value Date/Time    ALKPHOSPHAT 61 09/29/2020 10:03 AM    ASTSGOT 13 09/29/2020 10:03 AM    ALTSGPT 16 09/29/2020 10:03 AM    TBILIRUBIN 0.8 09/29/2020 10:03 AM

## 2021-04-16 DIAGNOSIS — I10 ESSENTIAL HYPERTENSION: ICD-10-CM

## 2021-04-19 RX ORDER — CHLORTHALIDONE 25 MG/1
TABLET ORAL
Qty: 90 TABLET | Refills: 0 | Status: SHIPPED | OUTPATIENT
Start: 2021-04-19 | End: 2021-07-08

## 2021-04-23 ENCOUNTER — HOSPITAL ENCOUNTER (OUTPATIENT)
Dept: LAB | Facility: MEDICAL CENTER | Age: 86
End: 2021-04-23
Attending: FAMILY MEDICINE
Payer: MEDICARE

## 2021-04-23 ENCOUNTER — OFFICE VISIT (OUTPATIENT)
Dept: MEDICAL GROUP | Facility: PHYSICIAN GROUP | Age: 86
End: 2021-04-23
Payer: MEDICARE

## 2021-04-23 VITALS
BODY MASS INDEX: 29.43 KG/M2 | HEIGHT: 59 IN | DIASTOLIC BLOOD PRESSURE: 60 MMHG | HEART RATE: 75 BPM | SYSTOLIC BLOOD PRESSURE: 122 MMHG | OXYGEN SATURATION: 93 % | WEIGHT: 146 LBS | TEMPERATURE: 97.2 F

## 2021-04-23 DIAGNOSIS — I10 ESSENTIAL HYPERTENSION: ICD-10-CM

## 2021-04-23 DIAGNOSIS — Z00.00 ENCOUNTER FOR PREVENTIVE CARE: ICD-10-CM

## 2021-04-23 DIAGNOSIS — R73.01 ELEVATED FASTING BLOOD SUGAR: ICD-10-CM

## 2021-04-23 DIAGNOSIS — E03.9 HYPOTHYROIDISM, UNSPECIFIED TYPE: ICD-10-CM

## 2021-04-23 DIAGNOSIS — C50.911 MALIGNANT NEOPLASM OF RIGHT FEMALE BREAST, UNSPECIFIED ESTROGEN RECEPTOR STATUS, UNSPECIFIED SITE OF BREAST (HCC): ICD-10-CM

## 2021-04-23 DIAGNOSIS — M85.89 OSTEOPENIA OF MULTIPLE SITES: ICD-10-CM

## 2021-04-23 LAB
ALBUMIN SERPL BCP-MCNC: 3.9 G/DL (ref 3.2–4.9)
ALBUMIN/GLOB SERPL: 1.4 G/DL
ALP SERPL-CCNC: 54 U/L (ref 30–99)
ALT SERPL-CCNC: 15 U/L (ref 2–50)
ANION GAP SERPL CALC-SCNC: 10 MMOL/L (ref 7–16)
AST SERPL-CCNC: 16 U/L (ref 12–45)
BILIRUB SERPL-MCNC: 0.9 MG/DL (ref 0.1–1.5)
BUN SERPL-MCNC: 14 MG/DL (ref 8–22)
CALCIUM SERPL-MCNC: 9.7 MG/DL (ref 8.5–10.5)
CHLORIDE SERPL-SCNC: 104 MMOL/L (ref 96–112)
CO2 SERPL-SCNC: 30 MMOL/L (ref 20–33)
CREAT SERPL-MCNC: 0.59 MG/DL (ref 0.5–1.4)
EST. AVERAGE GLUCOSE BLD GHB EST-MCNC: 120 MG/DL
GLOBULIN SER CALC-MCNC: 2.7 G/DL (ref 1.9–3.5)
GLUCOSE SERPL-MCNC: 86 MG/DL (ref 65–99)
HBA1C MFR BLD: 5.8 % (ref 4–5.6)
POTASSIUM SERPL-SCNC: 3.7 MMOL/L (ref 3.6–5.5)
PROT SERPL-MCNC: 6.6 G/DL (ref 6–8.2)
SODIUM SERPL-SCNC: 144 MMOL/L (ref 135–145)
T4 FREE SERPL-MCNC: 1.28 NG/DL (ref 0.93–1.7)
TSH SERPL DL<=0.005 MIU/L-ACNC: 2.9 UIU/ML (ref 0.38–5.33)

## 2021-04-23 PROCEDURE — 99214 OFFICE O/P EST MOD 30 MIN: CPT | Performed by: FAMILY MEDICINE

## 2021-04-23 PROCEDURE — 83036 HEMOGLOBIN GLYCOSYLATED A1C: CPT | Mod: GA

## 2021-04-23 PROCEDURE — 84439 ASSAY OF FREE THYROXINE: CPT

## 2021-04-23 PROCEDURE — 80053 COMPREHEN METABOLIC PANEL: CPT

## 2021-04-23 PROCEDURE — 84443 ASSAY THYROID STIM HORMONE: CPT

## 2021-04-23 PROCEDURE — 36415 COLL VENOUS BLD VENIPUNCTURE: CPT

## 2021-04-23 ASSESSMENT — PATIENT HEALTH QUESTIONNAIRE - PHQ9: CLINICAL INTERPRETATION OF PHQ2 SCORE: 0

## 2021-04-23 NOTE — PROGRESS NOTES
CC: Elevated fasting sugar    HISTORY OF THE PRESENT ILLNESS: Patient is a 85 y.o. female.     Patient is here today for routine follow-up.     She notes recently she has been tracking her sugars in the morning and monitoring them on an Excel spreadsheet.  Fasting sugars have been mildly elevated.  She has had known prediabetes in the past with last A1c of 5.7.  She is working on cutting back on sugar.    She continues to get Prolia shots through her oncologist and tolerates them well.  She is up-to-date on mammogram.    Blood pressure remains well controlled on current medications.    Allergies: Fosamax [alendronate sodium]    Current Outpatient Medications Ordered in Epic   Medication Sig Dispense Refill   • vitamin E 180 mg (400 units) Cap Take 180 mg by mouth every day.     • chlorthalidone (HYGROTON) 25 MG Tab TAKE 1 TABLET BY MOUTH  DAILY 90 tablet 0   • simvastatin (ZOCOR) 20 MG Tab TAKE 1 TABLET BY MOUTH IN  THE EVENING 90 tablet 1   • levothyroxine (SYNTHROID) 25 MCG Tab Take 1 Tab by mouth Every morning on an empty stomach. 90 Tab 3   • Calcium Carb-Cholecalciferol (CALCIUM 1000 + D PO) Take  by mouth.     • aspirin (ASA) 81 MG Chew Tab chewable tablet Take 81 mg by mouth every day.     • Cholecalciferol (VITAMIN D-3 PO) Take  by mouth.     • anastrozole (ARIMIDEX) 1 MG Tab RX by oncologist 30 Tab 0   • Multiple Vitamin (MULTI-VITAMIN DAILY PO) Take  by mouth.     • Cyanocobalamin (VITAMIN B-12) 1000 MCG TABS Take 1,000 mcg by mouth every evening.       No current Epic-ordered facility-administered medications on file.       Past Medical History:   Diagnosis Date   • Arthritis     shoulder   • Back pain    • Cancer (HCC)    • Cataract     genna IOLI   • DDD (degenerative disc disease), lumbar 3/9/2018   • Dental disorder     lower partial   • Healthcare maintenance 3/9/2018   • Hyperlipidemia    • Hypothyroid    • Lumbar spondylosis     with intermittent radiculopathy   • Peripheral edema 3/9/2018   •  "Snoring     sleep study done, negative, years ago       Past Surgical History:   Procedure Laterality Date   • MASTECTOMY Right 2016    Procedure: MASTECTOMY partial  ;  Surgeon: Rosy Parry M.D.;  Location: SURGERY SAME DAY [facility];  Service:    • NODE BIOPSY SENTINEL Right 2016    Procedure: NODE BIOPSY SENTINEL Lymph  ;  Surgeon: Rosy Parry M.D.;  Location: SURGERY SAME DAY [facility];  Service:    • ABDOMINAL HYSTERECTOMY TOTAL      non-cancer   • OOPHORECTOMY      tumor   • OTHER  ,     cataract IOLI genna   • US-NEEDLE CORE BX-BREAST PANEL         Social History     Tobacco Use   • Smoking status: Former Smoker     Packs/day: 0.50     Years: 45.00     Pack years: 22.50     Types: Cigarettes     Quit date: 1998     Years since quittin.3   • Smokeless tobacco: Never Used   • Tobacco comment: avoid all tobacco products   Substance Use Topics   • Alcohol use: Yes     Alcohol/week: 0.0 oz     Comment: 7/year   • Drug use: No       Social History     Social History Narrative   • Not on file       Family History   Problem Relation Age of Onset   • Heart Disease Mother    • Other Father        ROS:   Denies fever, chest pain, shortness of breath      Labs: Labs reviewed from 2020.    Exam: /60 (BP Location: Left arm, Patient Position: Sitting, BP Cuff Size: Adult)   Pulse 75   Temp 36.2 °C (97.2 °F) (Temporal)   Ht 1.499 m (4' 11\")   Wt 66.2 kg (146 lb)   SpO2 93%  Body mass index is 29.49 kg/m².    General: Well appearing, NAD  Pulmonary: Clear to ausculation.  Normal effort. No rales, ronchi, or wheezing.  Cardiovascular: Regular rate and rhythm without murmur, rubs or gallop.   Skin: Warm and dry.  No obvious lesions.  Musculoskeletal:  No extremity cyanosis, clubbing, or edema.  Psych: Normal mood and affect. Alert and oriented. Judgment and insight is normal.    Please note that this dictation was created using voice recognition software. I have " made every reasonable attempt to correct obvious errors, but I expect that there are errors of grammar and possibly content that I did not discover before finalizing the note.      Assessment/Plan  Corrinne was seen today for follow-up.    Diagnoses and all orders for this visit:    Elevated fasting blood sugar   Known issue.  Recommend we check A1c at this time which is a 3-month average of sugars.  Advised that carbohydrates are important as well as sugars in terms of cutting back.  -     HEMOGLOBIN A1C; Future    Essential hypertension  Chronic, well controlled on current medications.  -     Comp Metabolic Panel; Future    Hypothyroidism, unspecified type  Chronic, on levothyroxine.  Due for lab work.  -     TSH; Future  -     FREE THYROXINE; Future    Malignant neoplasm of right female breast, unspecified estrogen receptor status, unspecified site of breast (HCC)  Previous issue.  Up-to-date on mammogram, and follows with oncology.    Osteopenia of multiple sites  On Prolia injections.    Follow-up in 6 months or sooner if needed.    Olga Espinoza,   [facility]

## 2021-07-06 DIAGNOSIS — I10 ESSENTIAL HYPERTENSION: ICD-10-CM

## 2021-07-08 RX ORDER — CHLORTHALIDONE 25 MG/1
TABLET ORAL
Qty: 90 TABLET | Refills: 1 | Status: SHIPPED | OUTPATIENT
Start: 2021-07-08 | End: 2021-12-27

## 2021-07-08 NOTE — TELEPHONE ENCOUNTER
Refill X 6 months, sent to pharmacy.Pt. Seen in the last 6 months per protocol.   Lab Results   Component Value Date/Time    SODIUM 144 04/23/2021 11:42 AM    POTASSIUM 3.7 04/23/2021 11:42 AM    CHLORIDE 104 04/23/2021 11:42 AM    CO2 30 04/23/2021 11:42 AM    GLUCOSE 86 04/23/2021 11:42 AM    BUN 14 04/23/2021 11:42 AM    CREATININE 0.59 04/23/2021 11:42 AM

## 2021-09-17 RX ORDER — SIMVASTATIN 20 MG
TABLET ORAL
Qty: 90 TABLET | Refills: 3 | Status: SHIPPED | OUTPATIENT
Start: 2021-09-17 | End: 2022-08-08 | Stop reason: SDUPTHER

## 2021-09-22 DIAGNOSIS — E03.9 HYPOTHYROIDISM, UNSPECIFIED TYPE: ICD-10-CM

## 2021-09-23 ENCOUNTER — HOSPITAL ENCOUNTER (OUTPATIENT)
Dept: RADIOLOGY | Facility: MEDICAL CENTER | Age: 86
End: 2021-09-23
Attending: INTERNAL MEDICINE
Payer: MEDICARE

## 2021-09-23 DIAGNOSIS — Z12.31 VISIT FOR SCREENING MAMMOGRAM: ICD-10-CM

## 2021-09-23 PROCEDURE — 77063 BREAST TOMOSYNTHESIS BI: CPT

## 2021-09-24 RX ORDER — LEVOTHYROXINE SODIUM 0.03 MG/1
TABLET ORAL
Qty: 90 TABLET | Refills: 3 | Status: SHIPPED | OUTPATIENT
Start: 2021-09-24 | End: 2022-08-12 | Stop reason: SDUPTHER

## 2021-09-29 ENCOUNTER — HOSPITAL ENCOUNTER (OUTPATIENT)
Dept: RADIOLOGY | Facility: MEDICAL CENTER | Age: 86
End: 2021-09-29
Attending: SPECIALIST
Payer: MEDICARE

## 2021-09-29 DIAGNOSIS — M25.572 PAIN OF JOINT OF LEFT ANKLE AND FOOT: ICD-10-CM

## 2021-09-29 PROCEDURE — 73610 X-RAY EXAM OF ANKLE: CPT | Mod: LT

## 2021-10-08 ENCOUNTER — OFFICE VISIT (OUTPATIENT)
Dept: URGENT CARE | Facility: PHYSICIAN GROUP | Age: 86
End: 2021-10-08
Payer: MEDICARE

## 2021-10-08 VITALS
HEIGHT: 59 IN | RESPIRATION RATE: 12 BRPM | HEART RATE: 92 BPM | TEMPERATURE: 97.5 F | DIASTOLIC BLOOD PRESSURE: 60 MMHG | SYSTOLIC BLOOD PRESSURE: 120 MMHG | OXYGEN SATURATION: 95 % | WEIGHT: 149 LBS | BODY MASS INDEX: 30.04 KG/M2

## 2021-10-08 DIAGNOSIS — H60.392 OTHER INFECTIVE ACUTE OTITIS EXTERNA OF LEFT EAR: ICD-10-CM

## 2021-10-08 DIAGNOSIS — L08.9 ABRASION OF FINGER WITH INFECTION, INITIAL ENCOUNTER: ICD-10-CM

## 2021-10-08 DIAGNOSIS — H92.02 ACUTE OTALGIA, LEFT: ICD-10-CM

## 2021-10-08 DIAGNOSIS — S60.419A ABRASION OF FINGER WITH INFECTION, INITIAL ENCOUNTER: ICD-10-CM

## 2021-10-08 DIAGNOSIS — M79.645 FINGER PAIN, LEFT: ICD-10-CM

## 2021-10-08 PROCEDURE — 99214 OFFICE O/P EST MOD 30 MIN: CPT | Performed by: NURSE PRACTITIONER

## 2021-10-08 RX ORDER — SULFAMETHOXAZOLE AND TRIMETHOPRIM 800; 160 MG/1; MG/1
1 TABLET ORAL 2 TIMES DAILY
Qty: 14 TABLET | Refills: 0 | Status: SHIPPED | OUTPATIENT
Start: 2021-10-08 | End: 2021-10-15

## 2021-10-08 RX ORDER — GABAPENTIN 300 MG/1
1 CAPSULE ORAL 3 TIMES DAILY
COMMUNITY
Start: 2021-09-28 | End: 2023-03-10

## 2021-10-08 ASSESSMENT — ENCOUNTER SYMPTOMS
PSYCHIATRIC NEGATIVE: 1
EYES NEGATIVE: 1
RESPIRATORY NEGATIVE: 1
CARDIOVASCULAR NEGATIVE: 1
GASTROINTESTINAL NEGATIVE: 1
NEUROLOGICAL NEGATIVE: 1
CONSTITUTIONAL NEGATIVE: 1

## 2021-10-08 NOTE — PROGRESS NOTES
Subjective:   Corrinne Alice Carmignani is a 85 y.o. female who presents for Hand Pain (right middle finger , swelling was gardening and got something stuck)       HPI  Pt presents for evaluation of a new problem, reports right middle finger redness, swelling, and pain over the past several days.  Patient states that she recently was gardening as well as moving things and work around the yard, questions if she may have had a splinter.  Patient has not tried anything for this.  Additionally, patient states that she has had intermittent bleeding involving her left ear as well as left outer ear swelling over the past 2 days.  Patient states that occasionally she will use a Angel pin to clean her ear and is concerned she may have scratched her ear.  Patient denies any constitutional symptoms at this time.    Review of Systems   Constitutional: Negative.    HENT: Positive for ear discharge (intermittent bleeding, left) and ear pain.    Eyes: Negative.    Respiratory: Negative.    Cardiovascular: Negative.    Gastrointestinal: Negative.    Genitourinary: Negative.    Musculoskeletal: Positive for joint pain (right third finger).   Skin: Negative.    Neurological: Negative.    Psychiatric/Behavioral: Negative.    All other systems reviewed and are negative.      MEDS:   Current Outpatient Medications:   •  levothyroxine (SYNTHROID) 25 MCG Tab, TAKE 1 TABLET BY MOUTH IN  THE MORNING ON AN EMPTY  STOMACH, Disp: 90 Tablet, Rfl: 3  •  simvastatin (ZOCOR) 20 MG Tab, TAKE 1 TABLET BY MOUTH IN  THE EVENING, Disp: 90 Tablet, Rfl: 3  •  chlorthalidone (HYGROTON) 25 MG Tab, TAKE 1 TABLET BY MOUTH  DAILY, Disp: 90 tablet, Rfl: 1  •  vitamin E 180 mg (400 units) Cap, Take 180 mg by mouth every day., Disp: , Rfl:   •  Calcium Carb-Cholecalciferol (CALCIUM 1000 + D PO), Take  by mouth., Disp: , Rfl:   •  aspirin (ASA) 81 MG Chew Tab chewable tablet, Take 81 mg by mouth every day., Disp: , Rfl:   •  Cholecalciferol (VITAMIN D-3 PO), Take  " by mouth., Disp: , Rfl:   •  anastrozole (ARIMIDEX) 1 MG Tab, RX by oncologist, Disp: 30 Tab, Rfl: 0  •  Multiple Vitamin (MULTI-VITAMIN DAILY PO), Take  by mouth., Disp: , Rfl:   •  Cyanocobalamin (VITAMIN B-12) 1000 MCG TABS, Take 1,000 mcg by mouth every evening., Disp: , Rfl:   ALLERGIES:   Allergies   Allergen Reactions   • Fosamax [Alendronate Sodium]      Acid reflux        Patient's PMH, SocHx, SurgHx, FamHx, Drug allergies and medications were reviewed.     Objective:   /60 (BP Location: Right arm, Patient Position: Sitting, BP Cuff Size: Adult)   Pulse 92   Temp 36.4 °C (97.5 °F) (Temporal)   Resp 12   Ht 1.499 m (4' 11\")   Wt 67.6 kg (149 lb)   SpO2 95%   BMI 30.09 kg/m²     Physical Exam  Vitals and nursing note reviewed.   Constitutional:       General: She is awake.      Appearance: Normal appearance. She is well-developed.   HENT:      Head: Normocephalic and atraumatic.      Right Ear: External ear normal.      Left Ear: External ear normal. Swelling and tenderness present. Tympanic membrane is injected.      Nose: Nose normal.      Mouth/Throat:      Mouth: Mucous membranes are moist.      Pharynx: Oropharynx is clear.   Eyes:      Extraocular Movements: Extraocular movements intact.      Conjunctiva/sclera: Conjunctivae normal.   Cardiovascular:      Rate and Rhythm: Normal rate and regular rhythm.   Pulmonary:      Effort: Pulmonary effort is normal.      Breath sounds: Normal breath sounds.   Musculoskeletal:         General: Normal range of motion.        Hands:       Cervical back: Normal range of motion and neck supple.      Comments: Right aspect  third finger, medial has minimal erythema and edema, with splinter present.  Distal N/V intact, patient reports minimal pain.   Skin:     General: Skin is warm and dry.   Neurological:      Mental Status: She is alert and oriented to person, place, and time.   Psychiatric:         Mood and Affect: Mood normal.         Behavior: " Behavior normal.         Thought Content: Thought content normal.         Assessment/Plan:   Assessment    1. Other infective acute otitis externa of left ear  - sulfamethoxazole-trimethoprim (BACTRIM DS) 800-160 MG tablet; Take 1 Tablet by mouth 2 times a day for 7 days.  Dispense: 14 Tablet; Refill: 0    2. Abrasion of finger with infection, initial encounter  - sulfamethoxazole-trimethoprim (BACTRIM DS) 800-160 MG tablet; Take 1 Tablet by mouth 2 times a day for 7 days.  Dispense: 14 Tablet; Refill: 0    3. Acute otalgia, left    4. Finger pain, left    Vital signs stable at today's acute urgent care visit.  Begin medications as listed. Discussed management options (risks, benefits, and alternatives to treatment).     Advised the patient to follow-up with the primary care provider for recheck, reevaluation, and/or consideration of further management if necessary. Return to urgent care with any worsening symptoms or if there is no improvement in their current condition. Red flags discussed and indications to immediately call 911 or present to the ED.  All questions were encouraged and answered to the patient's satisfaction and understanding, and they agree to the plan of care.     I personally reviewed prior external notes and test results pertinent to today's visit.  I have independently reviewed and interpreted all diagnostics ordered during this urgent care acute visit. Time spent evaluating this patient was a minimum of 30 minutes and includes preparing for visit, counseling/education, exam, evaluation, obtaining history, and ordering lab/test/procedures.      Please note that this dictation was created using voice recognition software. I have made a reasonable attempt to correct obvious errors, but I expect that there are errors of grammar and possibly content that I did not discover before finalizing the note.

## 2021-11-02 ENCOUNTER — OFFICE VISIT (OUTPATIENT)
Dept: MEDICAL GROUP | Facility: PHYSICIAN GROUP | Age: 86
End: 2021-11-02
Payer: MEDICARE

## 2021-11-02 VITALS
BODY MASS INDEX: 29.25 KG/M2 | SYSTOLIC BLOOD PRESSURE: 114 MMHG | WEIGHT: 149 LBS | HEART RATE: 76 BPM | HEIGHT: 60 IN | DIASTOLIC BLOOD PRESSURE: 68 MMHG | OXYGEN SATURATION: 97 % | TEMPERATURE: 97.8 F

## 2021-11-02 DIAGNOSIS — E78.2 MIXED HYPERLIPIDEMIA: ICD-10-CM

## 2021-11-02 DIAGNOSIS — H61.23 BILATERAL IMPACTED CERUMEN: ICD-10-CM

## 2021-11-02 DIAGNOSIS — R73.01 ELEVATED FASTING BLOOD SUGAR: ICD-10-CM

## 2021-11-02 DIAGNOSIS — E03.9 HYPOTHYROIDISM, UNSPECIFIED TYPE: ICD-10-CM

## 2021-11-02 DIAGNOSIS — Z23 NEED FOR VACCINATION: ICD-10-CM

## 2021-11-02 DIAGNOSIS — I10 ESSENTIAL HYPERTENSION: ICD-10-CM

## 2021-11-02 DIAGNOSIS — M79.605 LEFT LEG PAIN: ICD-10-CM

## 2021-11-02 PROCEDURE — G0008 ADMIN INFLUENZA VIRUS VAC: HCPCS | Performed by: FAMILY MEDICINE

## 2021-11-02 PROCEDURE — 90662 IIV NO PRSV INCREASED AG IM: CPT | Performed by: FAMILY MEDICINE

## 2021-11-02 PROCEDURE — 99214 OFFICE O/P EST MOD 30 MIN: CPT | Mod: 25 | Performed by: FAMILY MEDICINE

## 2021-11-02 RX ORDER — ZOSTER VACCINE RECOMBINANT, ADJUVANTED 50 MCG/0.5
0.5 KIT INTRAMUSCULAR ONCE
Qty: 1 EACH | Refills: 0 | Status: SHIPPED | OUTPATIENT
Start: 2021-11-02 | End: 2021-11-02

## 2021-11-02 NOTE — PROGRESS NOTES
CC: Leg pain    HISTORY OF THE PRESENT ILLNESS: Patient is a 86 y.o. female.     Patient is here today for follow-up on chronic health issues including hypothyroidism, hyperlipidemia, hypertension.  She does note new concern of left leg pain.  She has seen Dr. Benitez in pain management for this issue.    She has lab work which includes CBC, CMP, phosphorus and magnesium levels per her oncologist.  She would like for me to add any additional lab work needed.    Allergies: Fosamax [alendronate sodium]    Current Outpatient Medications Ordered in Epic   Medication Sig Dispense Refill   • Zoster Vac Recomb Adjuvanted (SHINGRIX) 50 MCG/0.5ML Recon Susp Inject 0.5 mL into the shoulder, thigh, or buttocks one time for 1 dose. 1 Each 0   • gabapentin (NEURONTIN) 300 MG Cap Take 1 mg by mouth in the morning, at noon, and at bedtime.     • levothyroxine (SYNTHROID) 25 MCG Tab TAKE 1 TABLET BY MOUTH IN  THE MORNING ON AN EMPTY  STOMACH 90 Tablet 3   • simvastatin (ZOCOR) 20 MG Tab TAKE 1 TABLET BY MOUTH IN  THE EVENING 90 Tablet 3   • chlorthalidone (HYGROTON) 25 MG Tab TAKE 1 TABLET BY MOUTH  DAILY 90 tablet 1   • vitamin E 180 mg (400 units) Cap Take 180 mg by mouth every day.     • Calcium Carb-Cholecalciferol (CALCIUM 1000 + D PO) Take  by mouth.     • aspirin (ASA) 81 MG Chew Tab chewable tablet Take 81 mg by mouth every day.     • Cholecalciferol (VITAMIN D-3 PO) Take  by mouth.     • anastrozole (ARIMIDEX) 1 MG Tab RX by oncologist 30 Tab 0   • Multiple Vitamin (MULTI-VITAMIN DAILY PO) Take  by mouth.     • Cyanocobalamin (VITAMIN B-12) 1000 MCG TABS Take 1,000 mcg by mouth every evening.       No current Epic-ordered facility-administered medications on file.       Past Medical History:   Diagnosis Date   • Arthritis     shoulder   • Back pain    • Cancer (HCC)    • Cataract     genna IOLI   • DDD (degenerative disc disease), lumbar 3/9/2018   • Dental disorder     lower partial   • Healthcare maintenance 3/9/2018   •  "Hyperlipidemia    • Hypothyroid    • Lumbar spondylosis     with intermittent radiculopathy   • Peripheral edema 3/9/2018   • Snoring     sleep study done, negative, years ago       Past Surgical History:   Procedure Laterality Date   • MASTECTOMY Right 2016    Procedure: MASTECTOMY partial  ;  Surgeon: Rosy Parry M.D.;  Location: SURGERY SAME DAY [facility];  Service:    • NODE BIOPSY SENTINEL Right 2016    Procedure: NODE BIOPSY SENTINEL Lymph  ;  Surgeon: Rosy Parry M.D.;  Location: SURGERY SAME DAY [facility];  Service:    • ABDOMINAL HYSTERECTOMY TOTAL      non-cancer   • OOPHORECTOMY      tumor   • OTHER  ,     cataract IOLI genna   • US-NEEDLE CORE BX-BREAST PANEL         Social History     Tobacco Use   • Smoking status: Former Smoker     Packs/day: 0.50     Years: 45.00     Pack years: 22.50     Types: Cigarettes     Quit date: 1998     Years since quittin.8   • Smokeless tobacco: Never Used   • Tobacco comment: avoid all tobacco products   Vaping Use   • Vaping Use: Never used   Substance Use Topics   • Alcohol use: Yes     Alcohol/week: 0.0 oz     Comment: 7/year   • Drug use: No       Social History     Social History Narrative   • Not on file       Family History   Problem Relation Age of Onset   • Heart Disease Mother    • Other Father        Labs: Labs reviewed from 2021.      Exam: /68 (BP Location: Left arm, Patient Position: Sitting, BP Cuff Size: Adult)   Pulse 76   Temp 36.6 °C (97.8 °F) (Temporal)   Ht 1.53 m (5' 0.24\")   Wt 67.6 kg (149 lb)   SpO2 97%  Body mass index is 28.87 kg/m².    General: Well appearing, NAD  HEENT: Normocephalic. Conjunctiva clear, lids without ptosis, pupils equal and reactive to light accommodation, ears normal shape and contour, ears are bilaterally impacted with cerumen.  Neck: Supple without JVD. No thyromegaly or nodules  Pulmonary: Clear to ausculation.  Normal effort. No rales, ronchi, or " wheezing.  Cardiovascular: Regular rate and rhythm without murmur, rubs or gallop.  1-2+ lower extremity edema to midshin bilaterally, left greater than right.  Abdomen: Soft, nontender, nondistended. Normal bowel sounds. Liver and spleen are not palpable. No rebound or guarding  Neurologic: normal gait  Lymph: No cervical, supraclavicular lymph nodes are palpable  Skin: Warm and dry.  No obvious lesions.  Musculoskeletal:  No extremity cyanosis, clubbing, or edema.  Psych: Normal mood and affect. Alert and oriented. Judgment and insight is normal.    Please note that this dictation was created using voice recognition software. I have made every reasonable attempt to correct obvious errors, but I expect that there are errors of grammar and possibly content that I did not discover before finalizing the note.      Assessment/Plan  Corrinne was seen today for follow-up.    Diagnoses and all orders for this visit:    Need for vaccination  -     INFLUENZA VACCINE, HIGH DOSE (65+ ONLY)  -     Zoster Vac Recomb Adjuvanted (SHINGRIX) 50 MCG/0.5ML Recon Susp; Inject 0.5 mL into the shoulder, thigh, or buttocks one time for 1 dose.    Hypothyroidism, unspecified type  Chronic issue for the patient.  Historically well controlled on current dose of levothyroxine.  Will recheck at this time.  -     TSH; Future  -     FREE THYROXINE; Future    Elevated fasting blood sugar  A1c in the prediabetes range at 5.8 noted on lab work in April 2021.  Patient has been monitoring her sugars since that time and they are mostly ranging in the low 100s.  Will recheck A1c at this time.  -     HEMOGLOBIN A1C; Future    Mixed hyperlipidemia  Chronic issue, historically well controlled on simvastatin.  Due for lipid profile.  -     Lipid Profile; Future    Left leg pain  New problem for the patient.  She reports that the pain seems to radiate upward from ankle/knee all the way to the hip.  She saw her pain management specialist Dr. Benitez who  gave her an epidural which only lasted about 1 day.  She is using gabapentin which seems to help after a few hours of taking it.  She notes that Dr. Benitez ordered lumbar MRI and lower extremity venous ultrasound which she is trying to get scheduled today.    Bilateral impacted cerumen  Noted on exam today.  Recommend Debrox drops x1 week and return to clinic for ear lavage.    Essential hypertension  Very well controlled on current medication chlorthalidone.    Follow-up in 1 week for ear lavage, otherwise follow-up in 6 months for routine care.    Olga Espinoza,   [facility]

## 2021-11-09 ENCOUNTER — NON-PROVIDER VISIT (OUTPATIENT)
Dept: MEDICAL GROUP | Facility: PHYSICIAN GROUP | Age: 86
End: 2021-11-09
Payer: MEDICARE

## 2021-11-09 PROCEDURE — 99999 PR NO CHARGE: CPT | Performed by: FAMILY MEDICINE

## 2021-11-09 NOTE — PROGRESS NOTES
Corrinne Analijae Rider is a 86 y.o. female here for a non-provider visit for ear lavage    If abnormal was an in office provider notified today (if so, indicate provider)? No    Routed to PCP? No

## 2021-11-12 ENCOUNTER — HOSPITAL ENCOUNTER (OUTPATIENT)
Dept: RADIOLOGY | Facility: MEDICAL CENTER | Age: 86
End: 2021-11-12
Attending: SPECIALIST
Payer: MEDICARE

## 2021-11-12 DIAGNOSIS — Z09 RADIOTHERAPY FOLLOW-UP EXAMINATION: ICD-10-CM

## 2021-11-12 DIAGNOSIS — M54.16 LUMBAR RADICULOPATHY: ICD-10-CM

## 2021-11-12 DIAGNOSIS — M25.579 PAIN IN JOINT INVOLVING ANKLE AND FOOT, UNSPECIFIED LATERALITY: ICD-10-CM

## 2021-11-12 PROCEDURE — 72148 MRI LUMBAR SPINE W/O DYE: CPT | Mod: MH

## 2021-11-12 PROCEDURE — 93971 EXTREMITY STUDY: CPT | Mod: LT

## 2021-11-16 ENCOUNTER — HOSPITAL ENCOUNTER (OUTPATIENT)
Dept: LAB | Facility: MEDICAL CENTER | Age: 86
End: 2021-11-16
Attending: INTERNAL MEDICINE
Payer: MEDICARE

## 2021-11-16 ENCOUNTER — HOSPITAL ENCOUNTER (OUTPATIENT)
Dept: LAB | Facility: MEDICAL CENTER | Age: 86
End: 2021-11-16
Attending: FAMILY MEDICINE
Payer: MEDICARE

## 2021-11-16 DIAGNOSIS — E78.2 MIXED HYPERLIPIDEMIA: ICD-10-CM

## 2021-11-16 DIAGNOSIS — R73.01 ELEVATED FASTING BLOOD SUGAR: ICD-10-CM

## 2021-11-16 DIAGNOSIS — E03.9 HYPOTHYROIDISM, UNSPECIFIED TYPE: ICD-10-CM

## 2021-11-16 LAB
ALBUMIN SERPL BCP-MCNC: 3.8 G/DL (ref 3.2–4.9)
ALBUMIN/GLOB SERPL: 1.5 G/DL
ALP SERPL-CCNC: 54 U/L (ref 30–99)
ALT SERPL-CCNC: 16 U/L (ref 2–50)
ANION GAP SERPL CALC-SCNC: 8 MMOL/L (ref 7–16)
AST SERPL-CCNC: 14 U/L (ref 12–45)
BASOPHILS # BLD AUTO: 1 % (ref 0–1.8)
BASOPHILS # BLD: 0.06 K/UL (ref 0–0.12)
BILIRUB SERPL-MCNC: 0.9 MG/DL (ref 0.1–1.5)
BUN SERPL-MCNC: 21 MG/DL (ref 8–22)
CALCIUM SERPL-MCNC: 9.7 MG/DL (ref 8.5–10.5)
CHLORIDE SERPL-SCNC: 101 MMOL/L (ref 96–112)
CHOLEST SERPL-MCNC: 155 MG/DL (ref 100–199)
CO2 SERPL-SCNC: 31 MMOL/L (ref 20–33)
CREAT SERPL-MCNC: 0.57 MG/DL (ref 0.5–1.4)
EOSINOPHIL # BLD AUTO: 0.22 K/UL (ref 0–0.51)
EOSINOPHIL NFR BLD: 3.6 % (ref 0–6.9)
ERYTHROCYTE [DISTWIDTH] IN BLOOD BY AUTOMATED COUNT: 48 FL (ref 35.9–50)
EST. AVERAGE GLUCOSE BLD GHB EST-MCNC: 120 MG/DL
FASTING STATUS PATIENT QL REPORTED: NORMAL
GLOBULIN SER CALC-MCNC: 2.6 G/DL (ref 1.9–3.5)
GLUCOSE SERPL-MCNC: 91 MG/DL (ref 65–99)
HBA1C MFR BLD: 5.8 % (ref 4–5.6)
HCT VFR BLD AUTO: 42.9 % (ref 37–47)
HDLC SERPL-MCNC: 67 MG/DL
HGB BLD-MCNC: 14.1 G/DL (ref 12–16)
IMM GRANULOCYTES # BLD AUTO: 0.02 K/UL (ref 0–0.11)
IMM GRANULOCYTES NFR BLD AUTO: 0.3 % (ref 0–0.9)
LDLC SERPL CALC-MCNC: 79 MG/DL
LYMPHOCYTES # BLD AUTO: 1.11 K/UL (ref 1–4.8)
LYMPHOCYTES NFR BLD: 18.1 % (ref 22–41)
MAGNESIUM SERPL-MCNC: 1.7 MG/DL (ref 1.5–2.5)
MCH RBC QN AUTO: 31.9 PG (ref 27–33)
MCHC RBC AUTO-ENTMCNC: 32.9 G/DL (ref 33.6–35)
MCV RBC AUTO: 97.1 FL (ref 81.4–97.8)
MONOCYTES # BLD AUTO: 0.63 K/UL (ref 0–0.85)
MONOCYTES NFR BLD AUTO: 10.3 % (ref 0–13.4)
NEUTROPHILS # BLD AUTO: 4.08 K/UL (ref 2–7.15)
NEUTROPHILS NFR BLD: 66.7 % (ref 44–72)
NRBC # BLD AUTO: 0 K/UL
NRBC BLD-RTO: 0 /100 WBC
PHOSPHATE SERPL-MCNC: 3.9 MG/DL (ref 2.5–4.5)
PLATELET # BLD AUTO: 264 K/UL (ref 164–446)
PMV BLD AUTO: 11 FL (ref 9–12.9)
POTASSIUM SERPL-SCNC: 3.8 MMOL/L (ref 3.6–5.5)
PROT SERPL-MCNC: 6.4 G/DL (ref 6–8.2)
RBC # BLD AUTO: 4.42 M/UL (ref 4.2–5.4)
SODIUM SERPL-SCNC: 140 MMOL/L (ref 135–145)
T4 FREE SERPL-MCNC: 1.22 NG/DL (ref 0.93–1.7)
TRIGL SERPL-MCNC: 43 MG/DL (ref 0–149)
TSH SERPL DL<=0.005 MIU/L-ACNC: 4.19 UIU/ML (ref 0.38–5.33)
WBC # BLD AUTO: 6.1 K/UL (ref 4.8–10.8)

## 2021-11-16 PROCEDURE — 80053 COMPREHEN METABOLIC PANEL: CPT

## 2021-11-16 PROCEDURE — 83735 ASSAY OF MAGNESIUM: CPT

## 2021-11-16 PROCEDURE — 84443 ASSAY THYROID STIM HORMONE: CPT

## 2021-11-16 PROCEDURE — 84439 ASSAY OF FREE THYROXINE: CPT

## 2021-11-16 PROCEDURE — 84100 ASSAY OF PHOSPHORUS: CPT

## 2021-11-16 PROCEDURE — 85025 COMPLETE CBC W/AUTO DIFF WBC: CPT

## 2021-11-16 PROCEDURE — 80061 LIPID PANEL: CPT

## 2021-11-16 PROCEDURE — 83036 HEMOGLOBIN GLYCOSYLATED A1C: CPT | Mod: GA

## 2021-11-16 PROCEDURE — 36415 COLL VENOUS BLD VENIPUNCTURE: CPT

## 2021-12-23 DIAGNOSIS — I10 ESSENTIAL HYPERTENSION: ICD-10-CM

## 2021-12-27 RX ORDER — CHLORTHALIDONE 25 MG/1
TABLET ORAL
Qty: 90 TABLET | Refills: 3 | Status: SHIPPED | OUTPATIENT
Start: 2021-12-27 | End: 2022-12-27 | Stop reason: SDUPTHER

## 2022-02-03 ENCOUNTER — OFFICE VISIT (OUTPATIENT)
Dept: MEDICAL GROUP | Facility: PHYSICIAN GROUP | Age: 87
End: 2022-02-03
Payer: MEDICARE

## 2022-02-03 VITALS
HEART RATE: 71 BPM | WEIGHT: 150.6 LBS | SYSTOLIC BLOOD PRESSURE: 118 MMHG | HEIGHT: 60 IN | RESPIRATION RATE: 16 BRPM | TEMPERATURE: 98.1 F | DIASTOLIC BLOOD PRESSURE: 60 MMHG | OXYGEN SATURATION: 97 % | BODY MASS INDEX: 29.57 KG/M2

## 2022-02-03 DIAGNOSIS — E78.2 MIXED HYPERLIPIDEMIA: ICD-10-CM

## 2022-02-03 DIAGNOSIS — C50.911 MALIGNANT NEOPLASM OF RIGHT FEMALE BREAST, UNSPECIFIED ESTROGEN RECEPTOR STATUS, UNSPECIFIED SITE OF BREAST (HCC): ICD-10-CM

## 2022-02-03 DIAGNOSIS — Z76.89 ENCOUNTER TO ESTABLISH CARE: ICD-10-CM

## 2022-02-03 DIAGNOSIS — M85.89 OSTEOPENIA OF MULTIPLE SITES: ICD-10-CM

## 2022-02-03 DIAGNOSIS — I10 ESSENTIAL HYPERTENSION: ICD-10-CM

## 2022-02-03 DIAGNOSIS — E03.9 HYPOTHYROIDISM, UNSPECIFIED TYPE: ICD-10-CM

## 2022-02-03 DIAGNOSIS — R60.0 BILATERAL LOWER EXTREMITY EDEMA: ICD-10-CM

## 2022-02-03 DIAGNOSIS — Z86.39 HISTORY OF VITAMIN D DEFICIENCY: ICD-10-CM

## 2022-02-03 DIAGNOSIS — R73.03 PREDIABETES: ICD-10-CM

## 2022-02-03 PROBLEM — I35.8 AORTIC VALVE SCLEROSIS: Status: ACTIVE | Noted: 2022-02-03

## 2022-02-03 PROBLEM — I51.7 MILD LEFT VENTRICULAR HYPERTROPHY: Status: ACTIVE | Noted: 2022-02-03

## 2022-02-03 PROCEDURE — 99214 OFFICE O/P EST MOD 30 MIN: CPT | Performed by: STUDENT IN AN ORGANIZED HEALTH CARE EDUCATION/TRAINING PROGRAM

## 2022-02-03 RX ORDER — GABAPENTIN 100 MG/1
CAPSULE ORAL
COMMUNITY
Start: 2021-11-18

## 2022-02-03 ASSESSMENT — FIBROSIS 4 INDEX: FIB4 SCORE: 1.14

## 2022-02-03 ASSESSMENT — PATIENT HEALTH QUESTIONNAIRE - PHQ9: CLINICAL INTERPRETATION OF PHQ2 SCORE: 0

## 2022-02-03 NOTE — ASSESSMENT & PLAN NOTE
Chronic. Only needed radiation, currently on anastrozole. Followed by oncology every 6 months. Next mammo due in 9/2022.

## 2022-02-03 NOTE — ASSESSMENT & PLAN NOTE
This is a chronic addition.  Patient doing well with chlorthalidone 25 mg daily.    She denies any chest pain, shortness of breath, orthopnea or dyspnea/lightheadedness on exertion.  She does report chronic bilateral lower extremity edema with some discoloration without leg pain or ulcerations.  She bought some compression stockings but has not used yet.

## 2022-02-03 NOTE — PROGRESS NOTES
Subjective:     CC: Establish care and discuss if labs are needed    HISTORY OF THE PRESENT ILLNESS: Patient is a 86 y.o. female. This pleasant patient is here today to establish care and discuss if labs are needed. His/her prior PCP was Dr. Espinoza.    Malignant neoplasm of right breast  Chronic. Only needed radiation, currently on anastrozole. Followed by oncology every 6 months. Next mammo due in 9/2022.    DDD (degenerative disc disease), lumbar  Chronic. Patient reports she has had epidural steroid injections. She reports ankle pain with radiation into the leg. Went to specialist (Dr. Canseco) and was told this was her back. Started on gabapentin and not taking daily, only prn for pain in leg (sharp).    Essential hypertension  This is a chronic addition.  Patient doing well with chlorthalidone 25 mg daily.    She denies any chest pain, shortness of breath, orthopnea or dyspnea/lightheadedness on exertion.  She does report chronic bilateral lower extremity edema with some discoloration without leg pain or ulcerations.  She bought some compression stockings but has not used yet.    Current Outpatient Medications Ordered in Epic   Medication Sig Dispense Refill   • gabapentin (NEURONTIN) 100 MG Cap      • chlorthalidone (HYGROTON) 25 MG Tab TAKE 1 TABLET BY MOUTH  DAILY 90 Tablet 3   • gabapentin (NEURONTIN) 300 MG Cap Take 1 mg by mouth in the morning, at noon, and at bedtime.     • levothyroxine (SYNTHROID) 25 MCG Tab TAKE 1 TABLET BY MOUTH IN  THE MORNING ON AN EMPTY  STOMACH 90 Tablet 3   • simvastatin (ZOCOR) 20 MG Tab TAKE 1 TABLET BY MOUTH IN  THE EVENING 90 Tablet 3   • vitamin E 180 mg (400 units) Cap Take 180 mg by mouth every day.     • Calcium Carb-Cholecalciferol (CALCIUM 1000 + D PO) Take  by mouth.     • aspirin (ASA) 81 MG Chew Tab chewable tablet Take 81 mg by mouth every day.     • Cholecalciferol (VITAMIN D-3 PO) Take 2,000 Units by mouth every day.     • anastrozole (ARIMIDEX) 1 MG Tab RX by  "oncologist 30 Tab 0   • Multiple Vitamin (MULTI-VITAMIN DAILY PO) Take  by mouth.     • Cyanocobalamin (VITAMIN B-12) 1000 MCG TABS Take 1,000 mcg by mouth every evening.       No current Epic-ordered facility-administered medications on file.     ROS:   Gen: no fevers/chills, weight loss with dietary changes  ENT: no sore throat  Pulm: no sob, no cough  CV: no chest pain, no palpitations  GI: no nausea/vomiting, no diarrhea  Skin: no rash  Neuro: no headaches, no numbness/tingling  Heme/Lymph: no easy bruising      Objective:     Exam: /60 (BP Location: Left arm, Patient Position: Sitting, BP Cuff Size: Adult)   Pulse 71   Temp 36.7 °C (98.1 °F) (Temporal)   Resp 16   Ht 1.53 m (5' 0.24\")   Wt 68.3 kg (150 lb 9.6 oz)   SpO2 97%  Body mass index is 29.18 kg/m².    General: Well developed, well nourished in no acute distress.  Head: Normocephalic and atraumatic.  Eyes: Conjunctivae and extraocular motions are normal. Pupils are equal, round. No scleral icterus.   Ears:  External ears unremarkable. Tympanic membranes clear and intact.  Nose: Nares patent. No discharge.  Mouth/Throat: Oropharynx is clear and moist. Posterior pharynx without erythema or exudates.  Neck: Supple. Thyroid is not enlarged.  Pulmonary: Clear to ausculation.  Normal effort. No rales, ronchi, or wheezing.  Cardiovascular: Regular rate.  Grade 2 systolic murmur heard best at the right upper sternal border.  No rubs or gallops.  Neurologic: No gross/focal deficits. Normal gait.   Lymph: No cervical or supraclavicular lymph nodes are palpable  Skin: Warm and dry.  No obvious lesions.  Erythema without increased warmth in a stocking distribution bilateral lower extremities.  Musculoskeletal: No extremity cyanosis, clubbing.  Trace to +1 pitting edema bilateral lower extremity, above the sock line.  Psych: Normal mood and affect. Alert and oriented x3. Judgment and insight is normal.    Labs: Reviewed from 11/16/2021    Assessment & " Plan:   86 y.o. female with the following -    1. Encounter to establish care  Medical record reviewed and updated with patient.    2. Mixed hyperlipidemia  This is a chronic condition, well controlled.  Continue aspirin/statin for primary prevention.  - Comp Metabolic Panel; Future  - Lipid Profile; Future    3. Essential hypertension  This is a chronic condition, well controlled.  Continue chlorthalidone.  If needed to change medication may consider ACE inhibitor/ARB given presence of mild LVH on echocardiogram in 2014.  - Comp Metabolic Panel; Future  - CBC WITH DIFFERENTIAL; Future    4. Bilateral lower extremity edema  This is a reported chronic condition.  I reviewed her echocardiogram from 2014.  I suspect this may be related to venous stasis, gabapentin may be contributing but patient states that it did not worsen after start.  Recommended exercise, compression and elevation.  Discussed return precautions, if patient develops other symptoms or worsening would repeat echocardiogram.    5. Hypothyroidism, unspecified type  This is a chronic condition, well controlled.  Repeat TSH annually or as needed.  - TSH+FREE T4    6. Prediabetes  This is a chronic condition, patient has been making some dietary changes with some intentional weight loss recently which I encouraged that in addition to exercise.  Will trend glucose/A1c with annual labs.  - Comp Metabolic Panel; Future  - HEMOGLOBIN A1C; Future    7. Osteopenia of multiple sites  8. History of vitamin D deficiency  This is a chronic condition, had a adverse reaction to Fosamax and now continues with Prolia.  This is managed by her oncologist.  Taking vitamin D and calcium.  Will check vitamin D with next labs.  - VITAMIN D,25 HYDROXY; Future    9. Malignant neoplasm of right female breast, unspecified estrogen receptor status, unspecified site of breast (HCC)  This is a chronic condition, doing well and followed closely by oncology for 6 months.  Continue  anastrozole and follow-up with oncology as directed.    I spent a total of 30 minutes with record review, exam, communication with the patient, and documentation of this encounter.    Return in about 9 months (around 11/3/2022) for Annual.    Please note that this dictation was created using voice recognition software. I have made every reasonable attempt to correct obvious errors, but I expect that there are errors of grammar and possibly content that I did not discover before finalizing the note.

## 2022-02-03 NOTE — ASSESSMENT & PLAN NOTE
Chronic. Patient reports she has had epidural steroid injections. She reports ankle pain with radiation into the leg. Went to specialist (Dr. Canseco) and was told this was her back. Started on gabapentin and not taking daily, only prn for pain in leg (sharp).

## 2022-02-03 NOTE — PATIENT INSTRUCTIONS
Fasting labs due in November    Chronic Venous Insufficiency  Chronic venous insufficiency is a condition where the leg veins cannot effectively pump blood from the legs to the heart. This happens when the vein walls are either stretched, weakened, or damaged, or when the valves inside the vein are damaged. With the right treatment, you should be able to continue with an active life. This condition is also called venous stasis.  What are the causes?  Common causes of this condition include:  · High blood pressure inside the veins (venous hypertension).  · Sitting or standing too long, causing increased blood pressure in the leg veins.  · A blood clot that blocks blood flow in a vein (deep vein thrombosis, DVT).  · Inflammation of a vein (phlebitis) that causes a blood clot to form.  · Tumors in the pelvis that cause blood to back up.  What increases the risk?  The following factors may make you more likely to develop this condition:  · Having a family history of this condition.  · Obesity.  · Pregnancy.  · Living without enough regular physical activity or exercise (sedentary lifestyle).  · Smoking.  · Having a job that requires long periods of standing or sitting in one place.  · Being a certain age. Women in their 40s and 50s and men in their 70s are more likely to develop this condition.  What are the signs or symptoms?  Symptoms of this condition include:  · Veins that are enlarged, bulging, or twisted (varicose veins).  · Skin breakdown or ulcers.  · Reddened skin or dark discoloration of skin on the leg between the knee and ankle.  · Brown, smooth, tight, and painful skin just above the ankle, usually on the inside of the leg (lipodermatosclerosis).  · Swelling of the legs.  How is this diagnosed?  This condition may be diagnosed based on:  · Your medical history.  · A physical exam.  · Tests, such as:  ? A procedure that creates an image of a blood vessel and nearby organs and provides information about blood  flow through the blood vessel (duplex ultrasound).  ? A procedure that tests blood flow (plethysmography).  ? A procedure that looks at the veins using X-ray and dye (venogram).  How is this treated?  The goals of treatment are to help you return to an active life and to minimize pain or disability. Treatment depends on the severity of your condition, and it may include:  · Wearing compression stockings. These can help relieve symptoms and help prevent your condition from getting worse. However, they do not cure the condition.  · Sclerotherapy. This procedure involves an injection of a solution that shrinks damaged veins.  · Surgery. This may involve:  ? Removing a diseased vein (vein stripping).  ? Cutting off blood flow through the vein (laser ablation surgery).  ? Repairing or reconstructing a valve within the affected vein.  Follow these instructions at home:         · Wear compression stockings as told by your health care provider. These stockings help to prevent blood clots and reduce swelling in your legs.  · Take over-the-counter and prescription medicines only as told by your health care provider.  · Stay active by exercising, walking, or doing different activities. Ask your health care provider what activities are safe for you and how much exercise you need.  · Drink enough fluid to keep your urine pale yellow.  · Do not use any products that contain nicotine or tobacco, such as cigarettes, e-cigarettes, and chewing tobacco. If you need help quitting, ask your health care provider.  · Keep all follow-up visits as told by your health care provider. This is important.  Contact a health care provider if you:  · Have redness, swelling, or more pain in the affected area.  · See a red streak or line that goes up or down from the affected area.  · Have skin breakdown or skin loss in the affected area, even if the breakdown is small.  · Get an injury in the affected area.  Get help right away if:  · You get an  injury and an open wound in the affected area.  · You have:  ? Severe pain that does not get better with medicine.  ? Sudden numbness or weakness in the foot or ankle below the affected area.  ? Trouble moving your foot or ankle.  ? A fever.  ? Worse or persistent symptoms.  ? Chest pain.  ? Shortness of breath.  Summary  · Chronic venous insufficiency is a condition where the leg veins cannot effectively pump blood from the legs to the heart.  · Chronic venous insufficiency occurs when the vein walls become stretched, weakened, or damaged, or when valves within the vein are damaged.  · Treatment depends on how severe your condition is. It often involves wearing compression stockings and may involve having a procedure.  · Make sure you stay active by exercising, walking, or doing different activities. Ask your health care provider what activities are safe for you and how much exercise you need.  This information is not intended to replace advice given to you by your health care provider. Make sure you discuss any questions you have with your health care provider.  Document Released: 04/22/2008 Document Revised: 09/10/2019 Document Reviewed: 09/10/2019  [employer] Patient Education © 2020 [employer]

## 2022-02-10 ENCOUNTER — OFFICE VISIT (OUTPATIENT)
Dept: URGENT CARE | Facility: PHYSICIAN GROUP | Age: 87
End: 2022-02-10
Payer: MEDICARE

## 2022-02-10 ENCOUNTER — HOSPITAL ENCOUNTER (OUTPATIENT)
Facility: MEDICAL CENTER | Age: 87
End: 2022-02-10
Attending: PHYSICIAN ASSISTANT
Payer: MEDICARE

## 2022-02-10 VITALS
HEART RATE: 78 BPM | WEIGHT: 146.7 LBS | BODY MASS INDEX: 28.42 KG/M2 | OXYGEN SATURATION: 95 % | TEMPERATURE: 97.5 F | SYSTOLIC BLOOD PRESSURE: 142 MMHG | RESPIRATION RATE: 16 BRPM | DIASTOLIC BLOOD PRESSURE: 74 MMHG

## 2022-02-10 DIAGNOSIS — J98.8 RTI (RESPIRATORY TRACT INFECTION): ICD-10-CM

## 2022-02-10 LAB — COVID ORDER STATUS COVID19: NORMAL

## 2022-02-10 PROCEDURE — U0005 INFEC AGEN DETEC AMPLI PROBE: HCPCS

## 2022-02-10 PROCEDURE — 99214 OFFICE O/P EST MOD 30 MIN: CPT | Performed by: PHYSICIAN ASSISTANT

## 2022-02-10 PROCEDURE — U0003 INFECTIOUS AGENT DETECTION BY NUCLEIC ACID (DNA OR RNA); SEVERE ACUTE RESPIRATORY SYNDROME CORONAVIRUS 2 (SARS-COV-2) (CORONAVIRUS DISEASE [COVID-19]), AMPLIFIED PROBE TECHNIQUE, MAKING USE OF HIGH THROUGHPUT TECHNOLOGIES AS DESCRIBED BY CMS-2020-01-R: HCPCS

## 2022-02-10 RX ORDER — DOXYCYCLINE HYCLATE 100 MG
100 TABLET ORAL 2 TIMES DAILY
Qty: 14 TABLET | Refills: 0 | Status: SHIPPED | OUTPATIENT
Start: 2022-02-10 | End: 2022-02-17

## 2022-02-10 ASSESSMENT — ENCOUNTER SYMPTOMS
WHEEZING: 0
SHORTNESS OF BREATH: 0
NAUSEA: 0
FEVER: 0
DIZZINESS: 0
SPUTUM PRODUCTION: 1
VOMITING: 0
PALPITATIONS: 0
ABDOMINAL PAIN: 0
COUGH: 1
DIARRHEA: 0
SORE THROAT: 0
HEADACHES: 0
DIAPHORESIS: 0
CHILLS: 0
MYALGIAS: 0
SINUS PAIN: 0

## 2022-02-10 ASSESSMENT — FIBROSIS 4 INDEX: FIB4 SCORE: 1.14

## 2022-02-10 NOTE — PROGRESS NOTES
Subjective:   Corrinne Alice Carmignani is a 86 y.o. female who presents for Cough (with congestion x 1 week. No SOB)      HPI:  This is a very pleasant 86-year-old female presenting to the clinic with cough and congestion x1 week.  Patient states her symptoms are gradually worsening.  Symptoms started with sinus congestion and a scratchy throat.  Over the last 3 to 4 days she has noticed the congestion of to her chest.  She has a cough that is productive of clear sputum.  No associated shortness of breath or chest pain.  She has not been running a fever.  Denies any body aches or chills.  Still tolerating oral intake without complication.  No known ill contacts.  Fully vaccinated for COVID-19.  She has been taking OTC sugar-free cough medication with minimal improvement.   is currently in assisted nursing facility and is scheduled to come home soon.  She wants to make sure she is well so that she is strong enough to take care of him.    Review of Systems   Constitutional: Negative for chills, diaphoresis, fever and malaise/fatigue.   HENT: Positive for congestion. Negative for ear pain, sinus pain and sore throat.    Respiratory: Positive for cough and sputum production. Negative for shortness of breath and wheezing.    Cardiovascular: Negative for chest pain and palpitations.   Gastrointestinal: Negative for abdominal pain, diarrhea, nausea and vomiting.   Musculoskeletal: Negative for myalgias.   Neurological: Negative for dizziness and headaches.       Medications:    • anastrozole Tabs  • aspirin Chew  • CALCIUM 1000 + D PO  • chlorthalidone Tabs  • doxycycline Tabs  • gabapentin Caps  • levothyroxine Tabs  • MULTI-VITAMIN DAILY PO  • simvastatin Tabs  • vitamin B-12 Tabs  • VITAMIN D-3 PO  • vitamin E Caps    Allergies: Fosamax [alendronate sodium]    Problem List: Corrinne Alice Carmignani does not have any pertinent problems on file.    Surgical History:  Past Surgical History:   Procedure Laterality  Date   • MASTECTOMY Right 1/26/2016    Procedure: MASTECTOMY partial  ;  Surgeon: Rosy Parry M.D.;  Location: SURGERY SAME DAY [facility];  Service:    • NODE BIOPSY SENTINEL Right 1/26/2016    Procedure: NODE BIOPSY SENTINEL Lymph  ;  Surgeon: Rosy Parry M.D.;  Location: SURGERY SAME DAY [facility];  Service:    • OOPHORECTOMY  04/18/1977    cyst   • ABDOMINAL HYSTERECTOMY TOTAL  11/05/1972    non-cancer   • TONSILLECTOMY  1946   • OTHER  2009, 2010    cataract IOLI genna   • US-NEEDLE CORE BX-BREAST PANEL         Past Social Hx: Corrinne Alice Carmignani  reports that she quit smoking about 24 years ago. Her smoking use included cigarettes. She has a 22.50 pack-year smoking history. She has never used smokeless tobacco. She reports current alcohol use. She reports that she does not use drugs.     Past Family Hx:  Corrinne Alice Carmignani family history includes Drug abuse in her son; Heart Disease in her mother; Other in her father.     Problem list, medications, and allergies reviewed by myself today in Epic.     Objective:     /74   Pulse 78   Temp 36.4 °C (97.5 °F)   Resp 16   Wt 66.5 kg (146 lb 11.2 oz)   SpO2 95%   BMI 28.42 kg/m²     Physical Exam  Constitutional:       General: She is not in acute distress.     Appearance: Normal appearance. She is not ill-appearing, toxic-appearing or diaphoretic.   HENT:      Head: Normocephalic and atraumatic.      Right Ear: Ear canal and external ear normal.      Left Ear: Ear canal and external ear normal.      Ears:      Comments: Bilateral middle ear effusion.  TMs not erythematous.  Canals patent.     Nose: Congestion present. No rhinorrhea.      Mouth/Throat:      Mouth: Mucous membranes are moist.      Pharynx: No oropharyngeal exudate or posterior oropharyngeal erythema.   Eyes:      Conjunctiva/sclera: Conjunctivae normal.   Cardiovascular:      Rate and Rhythm: Normal rate and regular rhythm.      Pulses: Normal pulses.      Heart  sounds: Normal heart sounds.   Pulmonary:      Effort: Pulmonary effort is normal.      Breath sounds: Normal breath sounds. No wheezing, rhonchi or rales.   Musculoskeletal:      Cervical back: Normal range of motion. No muscular tenderness.   Lymphadenopathy:      Cervical: No cervical adenopathy.   Skin:     General: Skin is warm and dry.      Capillary Refill: Capillary refill takes less than 2 seconds.   Neurological:      Mental Status: She is alert.   Psychiatric:         Mood and Affect: Mood normal.         Thought Content: Thought content normal.           Assessment/Plan:     Comments/MDM:     • Patient has been dealing with sinus congestion and progressively worsening cough x1 week.  States her symptoms have worsened over the last 3 to 4 days.  On exam lung sounds clear to auscultation.  No wheezes, rhonchi or rales.  SPO2 95% on room air.  I believe likely viral in etiology at this time.  We will send out for Covid PCR testing.  Results will be available in [employer].Contingent antibiotic prescription given to patient to fill upon meeting criteria of guidelines discussed.  Continued supportive care and increase fluid intake encouraged.  Strict return precautions for any worsening symptoms.  Call with any questions or concerns.     Diagnosis and associated orders:     1. RTI (respiratory tract infection)  COVID/SARS CoV-2 PCR    doxycycline (VIBRAMYCIN) 100 MG Tab            Differential diagnosis, natural history, supportive care, and indications for immediate follow-up discussed.    I personally reviewed prior external notes and test results pertinent to today's visit.     Advised the patient to follow-up with the primary care physician for recheck, reevaluation, and consideration of further management.    Please note that this dictation was created using voice recognition software. I have made reasonable attempt to correct obvious errors, but I expect that there are errors of grammar and possibly  content that I did not discover before finalizing the note.    This note was electronically signed by ADOLPH Lopez PA-C

## 2022-02-11 LAB
SARS-COV-2 RNA RESP QL NAA+PROBE: NOTDETECTED
SPECIMEN SOURCE: NORMAL

## 2022-05-12 PROBLEM — I73.9 PERIPHERAL ARTERIAL DISEASE (HCC): Status: ACTIVE | Noted: 2022-05-12

## 2022-06-06 ENCOUNTER — HOSPITAL ENCOUNTER (OUTPATIENT)
Dept: LAB | Facility: MEDICAL CENTER | Age: 87
End: 2022-06-06
Attending: INTERNAL MEDICINE
Payer: MEDICARE

## 2022-06-06 ENCOUNTER — HOSPITAL ENCOUNTER (OUTPATIENT)
Dept: LAB | Facility: MEDICAL CENTER | Age: 87
End: 2022-06-06
Attending: STUDENT IN AN ORGANIZED HEALTH CARE EDUCATION/TRAINING PROGRAM
Payer: MEDICARE

## 2022-06-06 DIAGNOSIS — Z86.39 HISTORY OF VITAMIN D DEFICIENCY: ICD-10-CM

## 2022-06-06 DIAGNOSIS — R73.03 PREDIABETES: ICD-10-CM

## 2022-06-06 DIAGNOSIS — I10 ESSENTIAL HYPERTENSION: ICD-10-CM

## 2022-06-06 DIAGNOSIS — M85.89 OSTEOPENIA OF MULTIPLE SITES: ICD-10-CM

## 2022-06-06 DIAGNOSIS — E78.2 MIXED HYPERLIPIDEMIA: ICD-10-CM

## 2022-06-06 LAB
25(OH)D3 SERPL-MCNC: 105 NG/ML (ref 30–100)
ALBUMIN SERPL BCP-MCNC: 4 G/DL (ref 3.2–4.9)
ALBUMIN SERPL BCP-MCNC: 4 G/DL (ref 3.2–4.9)
ALBUMIN/GLOB SERPL: 1.5 G/DL
ALBUMIN/GLOB SERPL: 1.5 G/DL
ALP SERPL-CCNC: 57 U/L (ref 30–99)
ALP SERPL-CCNC: 59 U/L (ref 30–99)
ALT SERPL-CCNC: 14 U/L (ref 2–50)
ALT SERPL-CCNC: 16 U/L (ref 2–50)
ANION GAP SERPL CALC-SCNC: 6 MMOL/L (ref 7–16)
ANION GAP SERPL CALC-SCNC: 8 MMOL/L (ref 7–16)
AST SERPL-CCNC: 15 U/L (ref 12–45)
AST SERPL-CCNC: 15 U/L (ref 12–45)
BASOPHILS # BLD AUTO: 1.1 % (ref 0–1.8)
BASOPHILS # BLD AUTO: 1.1 % (ref 0–1.8)
BASOPHILS # BLD: 0.06 K/UL (ref 0–0.12)
BASOPHILS # BLD: 0.06 K/UL (ref 0–0.12)
BILIRUB SERPL-MCNC: 0.7 MG/DL (ref 0.1–1.5)
BILIRUB SERPL-MCNC: 0.7 MG/DL (ref 0.1–1.5)
BUN SERPL-MCNC: 25 MG/DL (ref 8–22)
BUN SERPL-MCNC: 26 MG/DL (ref 8–22)
CALCIUM SERPL-MCNC: 10.1 MG/DL (ref 8.5–10.5)
CALCIUM SERPL-MCNC: 9.8 MG/DL (ref 8.5–10.5)
CHLORIDE SERPL-SCNC: 99 MMOL/L (ref 96–112)
CHLORIDE SERPL-SCNC: 99 MMOL/L (ref 96–112)
CHOLEST SERPL-MCNC: 173 MG/DL (ref 100–199)
CO2 SERPL-SCNC: 31 MMOL/L (ref 20–33)
CO2 SERPL-SCNC: 33 MMOL/L (ref 20–33)
CREAT SERPL-MCNC: 0.66 MG/DL (ref 0.5–1.4)
CREAT SERPL-MCNC: 0.72 MG/DL (ref 0.5–1.4)
EOSINOPHIL # BLD AUTO: 0.21 K/UL (ref 0–0.51)
EOSINOPHIL # BLD AUTO: 0.26 K/UL (ref 0–0.51)
EOSINOPHIL NFR BLD: 3.9 % (ref 0–6.9)
EOSINOPHIL NFR BLD: 4.8 % (ref 0–6.9)
ERYTHROCYTE [DISTWIDTH] IN BLOOD BY AUTOMATED COUNT: 45.9 FL (ref 35.9–50)
ERYTHROCYTE [DISTWIDTH] IN BLOOD BY AUTOMATED COUNT: 46.2 FL (ref 35.9–50)
EST. AVERAGE GLUCOSE BLD GHB EST-MCNC: 111 MG/DL
FASTING STATUS PATIENT QL REPORTED: NORMAL
GFR SERPLBLD CREATININE-BSD FMLA CKD-EPI: 81 ML/MIN/1.73 M 2
GFR SERPLBLD CREATININE-BSD FMLA CKD-EPI: 85 ML/MIN/1.73 M 2
GLOBULIN SER CALC-MCNC: 2.6 G/DL (ref 1.9–3.5)
GLOBULIN SER CALC-MCNC: 2.7 G/DL (ref 1.9–3.5)
GLUCOSE SERPL-MCNC: 90 MG/DL (ref 65–99)
GLUCOSE SERPL-MCNC: 94 MG/DL (ref 65–99)
HBA1C MFR BLD: 5.5 % (ref 4–5.6)
HCT VFR BLD AUTO: 45.7 % (ref 37–47)
HCT VFR BLD AUTO: 46.2 % (ref 37–47)
HDLC SERPL-MCNC: 69 MG/DL
HGB BLD-MCNC: 15 G/DL (ref 12–16)
HGB BLD-MCNC: 15.2 G/DL (ref 12–16)
IMM GRANULOCYTES # BLD AUTO: 0.01 K/UL (ref 0–0.11)
IMM GRANULOCYTES # BLD AUTO: 0.07 K/UL (ref 0–0.11)
IMM GRANULOCYTES NFR BLD AUTO: 0.2 % (ref 0–0.9)
IMM GRANULOCYTES NFR BLD AUTO: 1.3 % (ref 0–0.9)
LDLC SERPL CALC-MCNC: 89 MG/DL
LYMPHOCYTES # BLD AUTO: 1.06 K/UL (ref 1–4.8)
LYMPHOCYTES # BLD AUTO: 1.07 K/UL (ref 1–4.8)
LYMPHOCYTES NFR BLD: 19.7 % (ref 22–41)
LYMPHOCYTES NFR BLD: 19.9 % (ref 22–41)
MAGNESIUM SERPL-MCNC: 1.8 MG/DL (ref 1.5–2.5)
MCH RBC QN AUTO: 31.2 PG (ref 27–33)
MCH RBC QN AUTO: 31.5 PG (ref 27–33)
MCHC RBC AUTO-ENTMCNC: 32.8 G/DL (ref 33.6–35)
MCHC RBC AUTO-ENTMCNC: 32.9 G/DL (ref 33.6–35)
MCV RBC AUTO: 95 FL (ref 81.4–97.8)
MCV RBC AUTO: 95.9 FL (ref 81.4–97.8)
MONOCYTES # BLD AUTO: 0.62 K/UL (ref 0–0.85)
MONOCYTES # BLD AUTO: 0.63 K/UL (ref 0–0.85)
MONOCYTES NFR BLD AUTO: 11.5 % (ref 0–13.4)
MONOCYTES NFR BLD AUTO: 11.7 % (ref 0–13.4)
NEUTROPHILS # BLD AUTO: 3.36 K/UL (ref 2–7.15)
NEUTROPHILS # BLD AUTO: 3.37 K/UL (ref 2–7.15)
NEUTROPHILS NFR BLD: 62.3 % (ref 44–72)
NEUTROPHILS NFR BLD: 62.5 % (ref 44–72)
NRBC # BLD AUTO: 0 K/UL
NRBC # BLD AUTO: 0 K/UL
NRBC BLD-RTO: 0 /100 WBC
NRBC BLD-RTO: 0 /100 WBC
PHOSPHATE SERPL-MCNC: 3.7 MG/DL (ref 2.5–4.5)
PLATELET # BLD AUTO: 243 K/UL (ref 164–446)
PLATELET # BLD AUTO: 251 K/UL (ref 164–446)
PMV BLD AUTO: 11 FL (ref 9–12.9)
PMV BLD AUTO: 11.5 FL (ref 9–12.9)
POTASSIUM SERPL-SCNC: 3.7 MMOL/L (ref 3.6–5.5)
POTASSIUM SERPL-SCNC: 3.7 MMOL/L (ref 3.6–5.5)
PROT SERPL-MCNC: 6.6 G/DL (ref 6–8.2)
PROT SERPL-MCNC: 6.7 G/DL (ref 6–8.2)
RBC # BLD AUTO: 4.81 M/UL (ref 4.2–5.4)
RBC # BLD AUTO: 4.82 M/UL (ref 4.2–5.4)
SODIUM SERPL-SCNC: 138 MMOL/L (ref 135–145)
SODIUM SERPL-SCNC: 138 MMOL/L (ref 135–145)
T4 FREE SERPL-MCNC: 1.25 NG/DL (ref 0.93–1.7)
TRIGL SERPL-MCNC: 75 MG/DL (ref 0–149)
TSH SERPL DL<=0.005 MIU/L-ACNC: 5.39 UIU/ML (ref 0.38–5.33)
WBC # BLD AUTO: 5.4 K/UL (ref 4.8–10.8)
WBC # BLD AUTO: 5.4 K/UL (ref 4.8–10.8)

## 2022-06-06 PROCEDURE — 83735 ASSAY OF MAGNESIUM: CPT

## 2022-06-06 PROCEDURE — 85025 COMPLETE CBC W/AUTO DIFF WBC: CPT

## 2022-06-06 PROCEDURE — 80061 LIPID PANEL: CPT

## 2022-06-06 PROCEDURE — 80053 COMPREHEN METABOLIC PANEL: CPT | Mod: 91

## 2022-06-06 PROCEDURE — 84100 ASSAY OF PHOSPHORUS: CPT

## 2022-06-06 PROCEDURE — 83036 HEMOGLOBIN GLYCOSYLATED A1C: CPT

## 2022-06-06 PROCEDURE — 84439 ASSAY OF FREE THYROXINE: CPT

## 2022-06-06 PROCEDURE — 84443 ASSAY THYROID STIM HORMONE: CPT

## 2022-06-06 PROCEDURE — 80053 COMPREHEN METABOLIC PANEL: CPT

## 2022-06-06 PROCEDURE — 82306 VITAMIN D 25 HYDROXY: CPT | Mod: GA

## 2022-06-06 PROCEDURE — 36415 COLL VENOUS BLD VENIPUNCTURE: CPT

## 2022-06-06 PROCEDURE — 85025 COMPLETE CBC W/AUTO DIFF WBC: CPT | Mod: 91

## 2022-08-08 DIAGNOSIS — E78.2 MIXED HYPERLIPIDEMIA: ICD-10-CM

## 2022-08-08 RX ORDER — SIMVASTATIN 20 MG
20 TABLET ORAL EVERY EVENING
Qty: 90 TABLET | Refills: 3 | Status: SHIPPED | OUTPATIENT
Start: 2022-08-08 | End: 2022-08-15 | Stop reason: SDUPTHER

## 2022-08-12 DIAGNOSIS — E03.9 HYPOTHYROIDISM, UNSPECIFIED TYPE: ICD-10-CM

## 2022-08-12 DIAGNOSIS — T45.2X1A POISONING BY VITAMIN D, ACCIDENTAL OR UNINTENTIONAL, INITIAL ENCOUNTER: ICD-10-CM

## 2022-08-12 RX ORDER — LEVOTHYROXINE SODIUM 0.03 MG/1
25 TABLET ORAL
Qty: 90 TABLET | Refills: 0 | Status: SHIPPED | OUTPATIENT
Start: 2022-08-12 | End: 2022-08-15 | Stop reason: SDUPTHER

## 2022-08-13 DIAGNOSIS — E03.9 HYPOTHYROIDISM, UNSPECIFIED TYPE: ICD-10-CM

## 2022-08-13 DIAGNOSIS — E78.2 MIXED HYPERLIPIDEMIA: ICD-10-CM

## 2022-08-13 NOTE — TELEPHONE ENCOUNTER
1. Caller Name: Corrinne Carmignani           Call Back Number: [phone]      How would the patient prefer to be contacted with a response: Phone call OK to leave a detailed message    Pt's levothyroxine and simvastatin was sent to [facility] instead of [facility] and would like them resent.    Please advise.

## 2022-08-15 RX ORDER — SIMVASTATIN 20 MG
20 TABLET ORAL EVERY EVENING
Qty: 90 TABLET | Refills: 0 | Status: SHIPPED | OUTPATIENT
Start: 2022-08-15 | End: 2023-01-06 | Stop reason: SDUPTHER

## 2022-08-15 RX ORDER — LEVOTHYROXINE SODIUM 0.03 MG/1
25 TABLET ORAL
Qty: 90 TABLET | Refills: 0 | Status: SHIPPED | OUTPATIENT
Start: 2022-08-15 | End: 2022-09-23 | Stop reason: SDUPTHER

## 2022-08-15 NOTE — TELEPHONE ENCOUNTER
Phone Number Called: [phone] (home)     Call outcome: Did not leave a detailed message. Requested patient to call back.

## 2022-09-13 ENCOUNTER — HOSPITAL ENCOUNTER (OUTPATIENT)
Dept: LAB | Facility: MEDICAL CENTER | Age: 87
End: 2022-09-13
Attending: STUDENT IN AN ORGANIZED HEALTH CARE EDUCATION/TRAINING PROGRAM
Payer: MEDICARE

## 2022-09-13 ENCOUNTER — HOSPITAL ENCOUNTER (OUTPATIENT)
Dept: LAB | Facility: MEDICAL CENTER | Age: 87
End: 2022-09-13
Attending: INTERNAL MEDICINE
Payer: MEDICARE

## 2022-09-13 DIAGNOSIS — T45.2X1A POISONING BY VITAMIN D, ACCIDENTAL OR UNINTENTIONAL, INITIAL ENCOUNTER: ICD-10-CM

## 2022-09-13 DIAGNOSIS — E03.9 HYPOTHYROIDISM, UNSPECIFIED TYPE: ICD-10-CM

## 2022-09-13 LAB
25(OH)D3 SERPL-MCNC: 84 NG/ML (ref 30–100)
ALBUMIN SERPL BCP-MCNC: 4 G/DL (ref 3.2–4.9)
ALBUMIN/GLOB SERPL: 1.4 G/DL
ALP SERPL-CCNC: 55 U/L (ref 30–99)
ALT SERPL-CCNC: 14 U/L (ref 2–50)
ANION GAP SERPL CALC-SCNC: 7 MMOL/L (ref 7–16)
AST SERPL-CCNC: 16 U/L (ref 12–45)
BASOPHILS # BLD AUTO: 0.9 % (ref 0–1.8)
BASOPHILS # BLD: 0.05 K/UL (ref 0–0.12)
BILIRUB SERPL-MCNC: 0.9 MG/DL (ref 0.1–1.5)
BUN SERPL-MCNC: 16 MG/DL (ref 8–22)
CALCIUM SERPL-MCNC: 9.8 MG/DL (ref 8.5–10.5)
CHLORIDE SERPL-SCNC: 98 MMOL/L (ref 96–112)
CO2 SERPL-SCNC: 31 MMOL/L (ref 20–33)
CREAT SERPL-MCNC: 0.67 MG/DL (ref 0.5–1.4)
EOSINOPHIL # BLD AUTO: 0.09 K/UL (ref 0–0.51)
EOSINOPHIL NFR BLD: 1.6 % (ref 0–6.9)
ERYTHROCYTE [DISTWIDTH] IN BLOOD BY AUTOMATED COUNT: 44.5 FL (ref 35.9–50)
GFR SERPLBLD CREATININE-BSD FMLA CKD-EPI: 85 ML/MIN/1.73 M 2
GLOBULIN SER CALC-MCNC: 2.9 G/DL (ref 1.9–3.5)
GLUCOSE SERPL-MCNC: 93 MG/DL (ref 65–99)
HCT VFR BLD AUTO: 44 % (ref 37–47)
HGB BLD-MCNC: 14.7 G/DL (ref 12–16)
IMM GRANULOCYTES # BLD AUTO: 0.01 K/UL (ref 0–0.11)
IMM GRANULOCYTES NFR BLD AUTO: 0.2 % (ref 0–0.9)
LYMPHOCYTES # BLD AUTO: 1.2 K/UL (ref 1–4.8)
LYMPHOCYTES NFR BLD: 21 % (ref 22–41)
MAGNESIUM SERPL-MCNC: 1.7 MG/DL (ref 1.5–2.5)
MCH RBC QN AUTO: 31.5 PG (ref 27–33)
MCHC RBC AUTO-ENTMCNC: 33.4 G/DL (ref 33.6–35)
MCV RBC AUTO: 94.2 FL (ref 81.4–97.8)
MONOCYTES # BLD AUTO: 0.59 K/UL (ref 0–0.85)
MONOCYTES NFR BLD AUTO: 10.3 % (ref 0–13.4)
NEUTROPHILS # BLD AUTO: 3.78 K/UL (ref 2–7.15)
NEUTROPHILS NFR BLD: 66 % (ref 44–72)
NRBC # BLD AUTO: 0 K/UL
NRBC BLD-RTO: 0 /100 WBC
PHOSPHATE SERPL-MCNC: 3.4 MG/DL (ref 2.5–4.5)
PLATELET # BLD AUTO: 230 K/UL (ref 164–446)
PMV BLD AUTO: 10.9 FL (ref 9–12.9)
POTASSIUM SERPL-SCNC: 3.8 MMOL/L (ref 3.6–5.5)
PROT SERPL-MCNC: 6.9 G/DL (ref 6–8.2)
RBC # BLD AUTO: 4.67 M/UL (ref 4.2–5.4)
SODIUM SERPL-SCNC: 136 MMOL/L (ref 135–145)
TSH SERPL DL<=0.005 MIU/L-ACNC: 3.72 UIU/ML (ref 0.38–5.33)
WBC # BLD AUTO: 5.7 K/UL (ref 4.8–10.8)

## 2022-09-13 PROCEDURE — 83735 ASSAY OF MAGNESIUM: CPT

## 2022-09-13 PROCEDURE — 85025 COMPLETE CBC W/AUTO DIFF WBC: CPT

## 2022-09-13 PROCEDURE — 36415 COLL VENOUS BLD VENIPUNCTURE: CPT

## 2022-09-13 PROCEDURE — 84443 ASSAY THYROID STIM HORMONE: CPT

## 2022-09-13 PROCEDURE — 80053 COMPREHEN METABOLIC PANEL: CPT

## 2022-09-13 PROCEDURE — 82306 VITAMIN D 25 HYDROXY: CPT

## 2022-09-13 PROCEDURE — 84100 ASSAY OF PHOSPHORUS: CPT

## 2022-09-23 ENCOUNTER — OFFICE VISIT (OUTPATIENT)
Dept: MEDICAL GROUP | Facility: PHYSICIAN GROUP | Age: 87
End: 2022-09-23
Payer: MEDICARE

## 2022-09-23 VITALS
HEART RATE: 80 BPM | WEIGHT: 152 LBS | BODY MASS INDEX: 29.84 KG/M2 | HEIGHT: 60 IN | DIASTOLIC BLOOD PRESSURE: 60 MMHG | OXYGEN SATURATION: 98 % | SYSTOLIC BLOOD PRESSURE: 160 MMHG | TEMPERATURE: 97.6 F

## 2022-09-23 DIAGNOSIS — Z02.89 ENCOUNTER FOR COMPLETION OF FORM WITH PATIENT: ICD-10-CM

## 2022-09-23 DIAGNOSIS — Z23 NEED FOR VACCINATION: ICD-10-CM

## 2022-09-23 DIAGNOSIS — E03.9 HYPOTHYROIDISM, UNSPECIFIED TYPE: ICD-10-CM

## 2022-09-23 DIAGNOSIS — I10 ESSENTIAL HYPERTENSION: ICD-10-CM

## 2022-09-23 PROBLEM — I70.0 AORTIC ATHEROSCLEROSIS (HCC): Status: ACTIVE | Noted: 2022-09-23

## 2022-09-23 PROBLEM — K57.30 SIGMOID DIVERTICULOSIS: Status: ACTIVE | Noted: 2022-09-23

## 2022-09-23 PROCEDURE — 90662 IIV NO PRSV INCREASED AG IM: CPT | Performed by: STUDENT IN AN ORGANIZED HEALTH CARE EDUCATION/TRAINING PROGRAM

## 2022-09-23 PROCEDURE — 90746 HEPB VACCINE 3 DOSE ADULT IM: CPT | Performed by: STUDENT IN AN ORGANIZED HEALTH CARE EDUCATION/TRAINING PROGRAM

## 2022-09-23 PROCEDURE — 99214 OFFICE O/P EST MOD 30 MIN: CPT | Mod: 25 | Performed by: STUDENT IN AN ORGANIZED HEALTH CARE EDUCATION/TRAINING PROGRAM

## 2022-09-23 PROCEDURE — G0008 ADMIN INFLUENZA VIRUS VAC: HCPCS | Performed by: STUDENT IN AN ORGANIZED HEALTH CARE EDUCATION/TRAINING PROGRAM

## 2022-09-23 PROCEDURE — G0010 ADMIN HEPATITIS B VACCINE: HCPCS | Performed by: STUDENT IN AN ORGANIZED HEALTH CARE EDUCATION/TRAINING PROGRAM

## 2022-09-23 RX ORDER — LEVOTHYROXINE SODIUM 0.03 MG/1
25 TABLET ORAL
Qty: 90 TABLET | Refills: 3 | Status: SHIPPED | OUTPATIENT
Start: 2022-09-23 | End: 2022-12-22

## 2022-09-23 ASSESSMENT — FIBROSIS 4 INDEX: FIB4 SCORE: 1.6

## 2022-09-23 NOTE — PROGRESS NOTES
"Subjective:     Chief Complaint   Patient presents with    Lab Results     HPI:   Corrinne presents today for lab review.    No problem-specific Assessment & Plan notes found for this encounter.    Current Outpatient Medications Ordered in Epic   Medication Sig Dispense Refill    levothyroxine (SYNTHROID) 25 MCG Tab Take 1 Tablet by mouth every morning on an empty stomach for 90 days. 90 Tablet 3    simvastatin (ZOCOR) 20 MG Tab Take 1 Tablet by mouth every evening. 90 Tablet 0    traMADol (ULTRAM) 50 MG Tab Take 50 mg by mouth 3 times a day as needed for Severe Pain.      gabapentin (NEURONTIN) 100 MG Cap       chlorthalidone (HYGROTON) 25 MG Tab TAKE 1 TABLET BY MOUTH  DAILY 90 Tablet 3    gabapentin (NEURONTIN) 300 MG Cap Take 1 mg by mouth in the morning, at noon, and at bedtime.      vitamin E 180 mg (400 units) Cap Take 180 mg by mouth every day.      Calcium Carb-Cholecalciferol (CALCIUM 1000 + D PO) Take  by mouth.      aspirin (ASA) 81 MG Chew Tab chewable tablet Take 81 mg by mouth every day.      Multiple Vitamin (MULTI-VITAMIN DAILY PO) Take  by mouth.      Cyanocobalamin (VITAMIN B-12) 1000 MCG TABS Take 1,000 mcg by mouth every evening.       No current Epic-ordered facility-administered medications on file.     ROS:  Gen: no fevers/chills, no changes in weight  Eyes: no changes in vision  ENT: no sore throat  Pulm: no sob, no cough  CV: no chest pain, no palpitations  GI: no nausea/vomiting, no diarrhea  MSk: stable back pain  Skin: no rash  Neuro: no headaches  Heme/Lymph: no easy bruising    Objective:     Exam:  BP (!) 160/60 (BP Location: Left arm, Patient Position: Sitting, BP Cuff Size: Adult)   Pulse 80   Temp 36.4 °C (97.6 °F) (Temporal)   Ht 1.53 m (5' 0.24\")   Wt 68.9 kg (152 lb)   SpO2 98%   BMI 29.45 kg/m²  Body mass index is 29.45 kg/m².    Physical Exam:  Constitutional: Well-developed and well-nourished. No acute distress.   Skin: Skin is warm and dry. No rash noted.  Head: " Atraumatic without lesions.  Eyes: Conjunctivae and extraocular motions are normal. Pupils are equal, round. No scleral icterus.   Mouth/Throat: Wearing mask.  Neck: Supple, trachea midline.   Cardiovascular: Regular rate and rhythm, grade 2 systolic murmur heard best at the right upper sternal border.  No rubs or gallops.  Lungs: Normal inspiratory effort, CTA bilaterally, no wheezes/rhonchi/rales  Extremities: No cyanosis, clubbing, erythema, nor edema.  Neurological: Alert and oriented x 3. No gross/focal deficits.  Psychiatric:  Behavior, mood, and affect are appropriate.    Labs: Reviewed from 9/13/2022  Imaging: DEXA 2020    Assessment & Plan:     86 y.o. female with the following -     1. Essential hypertension  Chronic, well controlled today. Reports in range just a few days ago. No changes to current treatment, trend at home and f/u if out of range.    2. Hypothyroidism, unspecified type  Chronic, well controlled. No changes to current treatment.  - levothyroxine (SYNTHROID) 25 MCG Tab; Take 1 Tablet by mouth every morning on an empty stomach for 90 days.  Dispense: 90 Tablet; Refill: 3    3. Encounter for completion of form with patient  [employer] placard form completed for patient (follow by [facility], needs cane at times and limited at times by back pain).    4. Need for vaccination  - INFLUENZA VACCINE, HIGH DOSE (65+ ONLY)  - Hep B Adult 20+    Return in about 6 weeks (around 11/3/2022), or if symptoms worsen or fail to improve, for Blood pressure.    Please note that this dictation was created using voice recognition software. I have made every reasonable attempt to correct obvious errors, but I expect that there are errors of grammar and possibly content that I did not discover before finalizing the note.

## 2022-09-27 ENCOUNTER — HOSPITAL ENCOUNTER (OUTPATIENT)
Dept: RADIOLOGY | Facility: MEDICAL CENTER | Age: 87
End: 2022-09-27
Attending: INTERNAL MEDICINE
Payer: MEDICARE

## 2022-09-27 DIAGNOSIS — C50.511 MALIGNANT NEOPLASM OF LOWER-OUTER QUADRANT OF RIGHT FEMALE BREAST, UNSPECIFIED ESTROGEN RECEPTOR STATUS (HCC): ICD-10-CM

## 2022-09-27 DIAGNOSIS — Z12.31 VISIT FOR SCREENING MAMMOGRAM: ICD-10-CM

## 2022-09-27 PROCEDURE — 77063 BREAST TOMOSYNTHESIS BI: CPT

## 2022-11-03 ENCOUNTER — OFFICE VISIT (OUTPATIENT)
Dept: MEDICAL GROUP | Facility: PHYSICIAN GROUP | Age: 87
End: 2022-11-03
Payer: MEDICARE

## 2022-11-03 VITALS
BODY MASS INDEX: 29.84 KG/M2 | SYSTOLIC BLOOD PRESSURE: 148 MMHG | WEIGHT: 148 LBS | DIASTOLIC BLOOD PRESSURE: 64 MMHG | HEART RATE: 64 BPM | HEIGHT: 59 IN | OXYGEN SATURATION: 95 % | TEMPERATURE: 97.9 F | RESPIRATION RATE: 20 BRPM

## 2022-11-03 DIAGNOSIS — Z23 NEED FOR VACCINATION: ICD-10-CM

## 2022-11-03 DIAGNOSIS — R60.0 BILATERAL LOWER EXTREMITY EDEMA: ICD-10-CM

## 2022-11-03 DIAGNOSIS — I51.7 MILD LEFT VENTRICULAR HYPERTROPHY: ICD-10-CM

## 2022-11-03 DIAGNOSIS — I10 ESSENTIAL HYPERTENSION: ICD-10-CM

## 2022-11-03 DIAGNOSIS — I35.8 AORTIC VALVE SCLEROSIS: ICD-10-CM

## 2022-11-03 PROCEDURE — 99214 OFFICE O/P EST MOD 30 MIN: CPT | Mod: 25 | Performed by: STUDENT IN AN ORGANIZED HEALTH CARE EDUCATION/TRAINING PROGRAM

## 2022-11-03 PROCEDURE — G0010 ADMIN HEPATITIS B VACCINE: HCPCS | Performed by: STUDENT IN AN ORGANIZED HEALTH CARE EDUCATION/TRAINING PROGRAM

## 2022-11-03 PROCEDURE — 90746 HEPB VACCINE 3 DOSE ADULT IM: CPT | Performed by: STUDENT IN AN ORGANIZED HEALTH CARE EDUCATION/TRAINING PROGRAM

## 2022-11-03 ASSESSMENT — FIBROSIS 4 INDEX: FIB4 SCORE: 1.62

## 2022-11-03 NOTE — PROGRESS NOTES
Subjective:     Chief Complaint   Patient presents with    Hypertension Follow-up     HPI:   Corrinne presents today with    Essential hypertension  This is a chronic condition.  She continues with chlorthalidone 25 mg daily.  Brings in a log from home with ranges:105-122/52-61.  Denies any chest pain, dyspnea on exertion, orthopnea, shortness of breath or lightheadedness.  She does have bilateral lower extremity edema that improves overnight and is more noticeable after getting up around in the morning.  Has been more noticeable in the 6 to 12 months.  Did buy compression stockings but they seem to get tight.  Denies any leg pain, sometimes when the legs are more swollen will get a bit red but not without rash or pruritus.    Current Outpatient Medications Ordered in Epic   Medication Sig Dispense Refill    levothyroxine (SYNTHROID) 25 MCG Tab Take 1 Tablet by mouth every morning on an empty stomach for 90 days. 90 Tablet 3    simvastatin (ZOCOR) 20 MG Tab Take 1 Tablet by mouth every evening. 90 Tablet 0    traMADol (ULTRAM) 50 MG Tab Take 50 mg by mouth 3 times a day as needed for Severe Pain.      gabapentin (NEURONTIN) 100 MG Cap       chlorthalidone (HYGROTON) 25 MG Tab TAKE 1 TABLET BY MOUTH  DAILY 90 Tablet 3    gabapentin (NEURONTIN) 300 MG Cap Take 1 mg by mouth in the morning, at noon, and at bedtime.      vitamin E 180 mg (400 units) Cap Take 180 mg by mouth every day.      Calcium Carb-Cholecalciferol (CALCIUM 1000 + D PO) Take  by mouth.      aspirin (ASA) 81 MG Chew Tab chewable tablet Take 81 mg by mouth every day.      Multiple Vitamin (MULTI-VITAMIN DAILY PO) Take  by mouth.      Cyanocobalamin (VITAMIN B-12) 1000 MCG TABS Take 1,000 mcg by mouth every evening.       No current Epic-ordered facility-administered medications on file.     Health Maintenance: Reviewed with patient.    ROS:  Gen: no fevers/chills, no changes in weight  Eyes: no changes in vision  ENT: no sore throat  Pulm: no sob, no  "cough  CV: no chest pain, no palpitations  GI: no nausea/vomiting, no diarrhea  MSk: no myalgias  Skin: no rash  Neuro: no headaches, no numbness/tingling  Heme/Lymph: no easy bruising    Objective:     Exam:  BP (!) 148/64 (BP Location: Left arm, Patient Position: Sitting, BP Cuff Size: Adult)   Pulse 64   Temp 36.6 °C (97.9 °F) (Temporal)   Resp 20   Ht 1.499 m (4' 11\")   Wt 67.1 kg (148 lb)   SpO2 95%   BMI 29.89 kg/m²  Body mass index is 29.89 kg/m².    Physical Exam:  Constitutional: Well-developed and well-nourished. No acute distress.   Skin: Skin is warm and dry. No rash noted.  Head: Atraumatic without lesions.  Eyes: Conjunctivae and extraocular motions are normal. Pupils are equal, round. No scleral icterus.   Mouth/Throat: Wearing mask.  Neck: Supple, trachea midline.   Cardiovascular: Regular rate and rhythm, grade 2 systolic murmur heard best at the right upper sternal border.  No rubs or gallops.  Lungs: Normal inspiratory effort, CTA bilaterally, no wheezes/rhonchi/rales  Abdomen: Soft, non tender, and without distention. Active bowel sounds. No rebound, guarding, masses or HSM.  Extremities: No cyanosis, clubbing, erythema. Trace pitting right and +1 left distal LE, varicose/spider veins more prominent on left ankle.  Neurological: Alert and oriented x 3. No gross/focal deficits.  Psychiatric:  Behavior, mood, and affect are appropriate.    Labs: Reviewed from 9/13/2022  Imaging: echo 2014    Assessment & Plan:     87 y.o. female with the following -     1. Essential hypertension  Chronic, well controlled at home and still elevated here as it's been prior. No changes to current medication for now given normal ranges at home. Discussed ideal ranges/return precautions and advised pt to bring in machine/cuff to cross check to confirm white coat HTN.  - EC-ECHOCARDIOGRAM COMPLETE W/O CONT; Future    2. Mild left ventricular hypertrophy  Noted in 2014, will obtain echo to trend given HTN as " above.  - EC-ECHOCARDIOGRAM COMPLETE W/O CONT; Future    3. Aortic valve sclerosis  With possible mild stenosis noted in 2014, murmur stable and denies symptoms. Update echo to trend.  - EC-ECHOCARDIOGRAM COMPLETE W/O CONT; Future    4. Bilateral lower extremity edema  Chronic, pt reports for 6-12m, but in her chart first noted around 2018 I this patient with varicose veins. Advised elevation, compression and f/u for worsening. Updating echo, but doubt heart failure with lack of other s/s. Suspect venous insufficiency.   - EC-ECHOCARDIOGRAM COMPLETE W/O CONT; Future    5. Need for vaccination  - Hep B Adult 20+    Return in about 3 months (around 2/3/2023), or if symptoms worsen or fail to improve.    Please note that this dictation was created using voice recognition software. I have made every reasonable attempt to correct obvious errors, but I expect that there are errors of grammar and possibly content that I did not discover before finalizing the note.

## 2022-11-03 NOTE — PATIENT INSTRUCTIONS
Ideal blood pressure <130/80 and at least <140/90. Come in any time to have your machine checked.

## 2022-11-03 NOTE — ASSESSMENT & PLAN NOTE
This is a chronic condition.  She continues with chlorthalidone 25 mg daily.  Brings in a log from home with ranges:105-122/52-61.  Denies any chest pain, dyspnea on exertion, orthopnea, shortness of breath or lightheadedness.  She does have bilateral lower extremity edema that improves overnight and is more noticeable after getting up around in the morning.  Has been more noticeable in the 6 to 12 months.  Did buy compression stockings but they seem to get tight.  Denies any leg pain, sometimes when the legs are more swollen will get a bit red but not without rash or pruritus.

## 2022-11-07 ENCOUNTER — PATIENT MESSAGE (OUTPATIENT)
Dept: HEALTH INFORMATION MANAGEMENT | Facility: OTHER | Age: 87
End: 2022-11-07

## 2022-11-11 ENCOUNTER — NON-PROVIDER VISIT (OUTPATIENT)
Dept: MEDICAL GROUP | Facility: PHYSICIAN GROUP | Age: 87
End: 2022-11-11
Payer: MEDICARE

## 2022-11-11 VITALS — DIASTOLIC BLOOD PRESSURE: 68 MMHG | SYSTOLIC BLOOD PRESSURE: 128 MMHG

## 2022-11-11 NOTE — PROGRESS NOTES
Corrinne Alice Carmignani is a 87 y.o. female here for a non-provider visit for blood pressure check     Encounter Vitals  Blood Pressure : 128/68    If abnormal, was the Registered Nurse (office provider if RN is unavailable) notified today? No    Routed to PCP/Requested Provider? Yes    PT BP cuff at home 125/61.

## 2022-12-27 DIAGNOSIS — I10 ESSENTIAL HYPERTENSION: ICD-10-CM

## 2022-12-27 RX ORDER — CHLORTHALIDONE 25 MG/1
25 TABLET ORAL DAILY
Qty: 90 TABLET | Refills: 0 | Status: SHIPPED | OUTPATIENT
Start: 2022-12-27 | End: 2023-02-22

## 2022-12-27 NOTE — TELEPHONE ENCOUNTER
Received request via: Patient    Was the patient seen in the last year in this department? Yes    Does the patient have an active prescription (recently filled or refills available) for medication(s) requested? No    Does the patient have long term Plus and need 100 day supply (blood pressure, diabetes and cholesterol meds only)? Patient does not have SCP

## 2022-12-30 DIAGNOSIS — E78.2 MIXED HYPERLIPIDEMIA: ICD-10-CM

## 2023-01-06 DIAGNOSIS — E78.2 MIXED HYPERLIPIDEMIA: ICD-10-CM

## 2023-01-06 RX ORDER — SIMVASTATIN 20 MG
20 TABLET ORAL EVERY EVENING
Qty: 90 TABLET | Refills: 3 | Status: SHIPPED | OUTPATIENT
Start: 2023-01-06 | End: 2023-12-04

## 2023-01-06 NOTE — TELEPHONE ENCOUNTER
Received request via: Patient    Was the patient seen in the last year in this department? Yes    Does the patient have an active prescription (recently filled or refills available) for medication(s) requested? No    Does the patient have senior living Plus and need 100 day supply (blood pressure, diabetes and cholesterol meds only)? Patient does not have SCP

## 2023-01-13 ENCOUNTER — OFFICE VISIT (OUTPATIENT)
Dept: URGENT CARE | Facility: PHYSICIAN GROUP | Age: 88
End: 2023-01-13
Payer: MEDICARE

## 2023-01-13 ENCOUNTER — HOSPITAL ENCOUNTER (OUTPATIENT)
Dept: LAB | Facility: MEDICAL CENTER | Age: 88
End: 2023-01-13
Attending: PHYSICIAN ASSISTANT
Payer: MEDICARE

## 2023-01-13 VITALS
DIASTOLIC BLOOD PRESSURE: 68 MMHG | HEIGHT: 59 IN | SYSTOLIC BLOOD PRESSURE: 134 MMHG | BODY MASS INDEX: 30.52 KG/M2 | WEIGHT: 151.4 LBS | OXYGEN SATURATION: 97 % | TEMPERATURE: 98.1 F | RESPIRATION RATE: 16 BRPM | HEART RATE: 78 BPM

## 2023-01-13 DIAGNOSIS — R20.2 TINGLING IN EXTREMITIES: ICD-10-CM

## 2023-01-13 LAB
ALBUMIN SERPL BCP-MCNC: 4 G/DL (ref 3.2–4.9)
ALBUMIN/GLOB SERPL: 1.4 G/DL
ALP SERPL-CCNC: 62 U/L (ref 30–99)
ALT SERPL-CCNC: 17 U/L (ref 2–50)
ANION GAP SERPL CALC-SCNC: 7 MMOL/L (ref 7–16)
AST SERPL-CCNC: 14 U/L (ref 12–45)
BASOPHILS # BLD AUTO: 0.9 % (ref 0–1.8)
BASOPHILS # BLD: 0.06 K/UL (ref 0–0.12)
BILIRUB SERPL-MCNC: 0.8 MG/DL (ref 0.1–1.5)
BUN SERPL-MCNC: 17 MG/DL (ref 8–22)
CALCIUM ALBUM COR SERPL-MCNC: 10 MG/DL (ref 8.5–10.5)
CALCIUM SERPL-MCNC: 10 MG/DL (ref 8.5–10.5)
CHLORIDE SERPL-SCNC: 99 MMOL/L (ref 96–112)
CO2 SERPL-SCNC: 33 MMOL/L (ref 20–33)
CREAT SERPL-MCNC: 0.67 MG/DL (ref 0.5–1.4)
EOSINOPHIL # BLD AUTO: 0.12 K/UL (ref 0–0.51)
EOSINOPHIL NFR BLD: 1.9 % (ref 0–6.9)
ERYTHROCYTE [DISTWIDTH] IN BLOOD BY AUTOMATED COUNT: 44 FL (ref 35.9–50)
GFR SERPLBLD CREATININE-BSD FMLA CKD-EPI: 84 ML/MIN/1.73 M 2
GLOBULIN SER CALC-MCNC: 2.8 G/DL (ref 1.9–3.5)
GLUCOSE SERPL-MCNC: 100 MG/DL (ref 65–99)
HCT VFR BLD AUTO: 46.3 % (ref 37–47)
HGB BLD-MCNC: 15.3 G/DL (ref 12–16)
IMM GRANULOCYTES # BLD AUTO: 0.02 K/UL (ref 0–0.11)
IMM GRANULOCYTES NFR BLD AUTO: 0.3 % (ref 0–0.9)
LYMPHOCYTES # BLD AUTO: 0.94 K/UL (ref 1–4.8)
LYMPHOCYTES NFR BLD: 14.6 % (ref 22–41)
MCH RBC QN AUTO: 31.3 PG (ref 27–33)
MCHC RBC AUTO-ENTMCNC: 33 G/DL (ref 33.6–35)
MCV RBC AUTO: 94.7 FL (ref 81.4–97.8)
MONOCYTES # BLD AUTO: 0.64 K/UL (ref 0–0.85)
MONOCYTES NFR BLD AUTO: 10 % (ref 0–13.4)
NEUTROPHILS # BLD AUTO: 4.64 K/UL (ref 2–7.15)
NEUTROPHILS NFR BLD: 72.3 % (ref 44–72)
NRBC # BLD AUTO: 0 K/UL
NRBC BLD-RTO: 0 /100 WBC
PLATELET # BLD AUTO: 232 K/UL (ref 164–446)
PMV BLD AUTO: 10.8 FL (ref 9–12.9)
POTASSIUM SERPL-SCNC: 3.8 MMOL/L (ref 3.6–5.5)
PROT SERPL-MCNC: 6.8 G/DL (ref 6–8.2)
RBC # BLD AUTO: 4.89 M/UL (ref 4.2–5.4)
SODIUM SERPL-SCNC: 139 MMOL/L (ref 135–145)
WBC # BLD AUTO: 6.4 K/UL (ref 4.8–10.8)

## 2023-01-13 PROCEDURE — 85025 COMPLETE CBC W/AUTO DIFF WBC: CPT

## 2023-01-13 PROCEDURE — 80053 COMPREHEN METABOLIC PANEL: CPT

## 2023-01-13 PROCEDURE — 36415 COLL VENOUS BLD VENIPUNCTURE: CPT

## 2023-01-13 PROCEDURE — 99214 OFFICE O/P EST MOD 30 MIN: CPT | Performed by: PHYSICIAN ASSISTANT

## 2023-01-13 ASSESSMENT — FIBROSIS 4 INDEX: FIB4 SCORE: 1.62

## 2023-01-13 NOTE — PROGRESS NOTES
"Subjective:   Corrinne Alice Carmignani is a 87 y.o. female who presents for Other (Tingling bilateral legs x2-3 weeks)      HPI  The patient presents to the Urgent Care with complaints of a tingling sensation primarily to bilateral lower legs onset a couple weeks ago.  Describes the sensation as a \"stimulating, mild stinging\" sensation. Denies any numbness. Location from knees to ankles bilaterally. History of mild lower extremity swelling and take chlorthalidone. Takes gabapentin as needed for back pain. No new medications.  Occasionally she will have similar sensation to her face mildly up to her right hand.  No face or upper extremity symptoms currently. No itching. Denies a history of diabetes but she has been monitoring her sugar levels as instructed by her PCP.  She wrote down her sugar levels ranging from  in the last month.  Denies any recent illness. Denies any weakness, fatigue, slurring of speech, confusion, memory loss, headache, nausea, saddle anesthesia, vomiting, dizziness, vision changes.         Medications:    aspirin Chew  CALCIUM 1000 + D PO  chlorthalidone Tabs  gabapentin Caps  MULTI-VITAMIN DAILY PO  simvastatin Tabs  traMADol Tabs  vitamin B-12 Tabs  vitamin E Caps    Allergies: Fosamax [alendronate sodium]    Problem List: Corrinne Alice Carmignani does not have any pertinent problems on file.    Surgical History:  Past Surgical History:   Procedure Laterality Date    MASTECTOMY Right 1/26/2016    Procedure: MASTECTOMY partial  ;  Surgeon: Rosy Parry M.D.;  Location: SURGERY SAME DAY [facility];  Service:     NODE BIOPSY SENTINEL Right 1/26/2016    Procedure: NODE BIOPSY SENTINEL Lymph  ;  Surgeon: Rosy Parry M.D.;  Location: SURGERY SAME DAY [facility];  Service:     OOPHORECTOMY  04/18/1977    cyst    ABDOMINAL HYSTERECTOMY TOTAL  11/05/1972    non-cancer    TONSILLECTOMY  1946    OTHER  2009, 2010    cataract IOLI genna    US-NEEDLE CORE BX-BREAST PANEL         Past " "Social Hx: Corrinne Alice Carmignani  reports that she quit smoking about 25 years ago. Her smoking use included cigarettes. She has a 22.50 pack-year smoking history. She has never used smokeless tobacco. She reports current alcohol use. She reports that she does not use drugs.     Past Family Hx:  Corrinne Alice Carmignani family history includes Drug abuse in her son; Heart Disease in her mother; Other in her father.     Problem list, medications, and allergies reviewed by myself today in Epic.     Objective:     /68 (BP Location: Left arm, Patient Position: Sitting, BP Cuff Size: Adult)   Pulse 78   Temp 36.7 °C (98.1 °F) (Temporal)   Resp 16   Ht 1.499 m (4' 11.02\")   Wt 68.7 kg (151 lb 6.4 oz)   SpO2 97%   BMI 30.56 kg/m²     Physical Exam  Vitals reviewed.   Constitutional:       General: She is not in acute distress.     Appearance: Normal appearance. She is not ill-appearing or toxic-appearing.   HENT:      Mouth/Throat:      Mouth: Mucous membranes are moist.      Pharynx: Oropharynx is clear.   Eyes:      Extraocular Movements: Extraocular movements intact.      Conjunctiva/sclera: Conjunctivae normal.      Pupils: Pupils are equal, round, and reactive to light.   Cardiovascular:      Rate and Rhythm: Normal rate and regular rhythm.      Heart sounds: Murmur (systolic right upper sternum) heard.   Pulmonary:      Effort: Pulmonary effort is normal.      Breath sounds: Normal breath sounds.   Musculoskeletal:      Cervical back: Neck supple. No rigidity or tenderness.      Comments: Trace edema bilateral lower extremities with some spider veins and small varicose veins left worse than right. Sensation intact. Pulses 2+ bilaterally. Cap refill < 2 seconds   Negative tenderness or palpable cords or masses.    Lymphadenopathy:      Cervical: No cervical adenopathy.   Skin:     General: Skin is warm and dry.   Neurological:      General: No focal deficit present.      Mental Status: She is alert " and oriented to person, place, and time.      Cranial Nerves: Cranial nerves 2-12 are intact.      Motor: Motor function is intact.      Coordination: Coordination is intact.      Gait: Gait is intact.      Deep Tendon Reflexes:      Reflex Scores:       Patellar reflexes are 2+ on the right side and 2+ on the left side.       Achilles reflexes are 2+ on the right side and 2+ on the left side.  Psychiatric:         Mood and Affect: Mood normal.         Behavior: Behavior normal.       Diagnosis and associated orders:     1. Tingling in extremities  - CBC WITH DIFFERENTIAL; Future  - Comp Metabolic Panel; Future       Comments/MDM:     This is a pleasant 87-year-old female who presents to the urgent care with complaints of tingling sensation to her lower extremities for about 2 weeks.  See full history above.  Discussed with patient unclear etiology of her symptoms.  Discussed wide differential diagnosis.  Possible varicosities causing her symptoms. She overall is well-appearing in no acute distress.  Benign examination.  Normal vital signs.  We will further evaluate with basic labs STAT. She is fasting today.  We will check electrolytes and kidney function.  Continue compression stockings.  Elevation.  Closely monitor symptoms.  Patient understands to present immediately to the ER if any worsening tingling or any numbness, weakness, lower extremity pain, or any other concerns.  She is going to follow-up with her PCP in a week.       I personally reviewed prior external notes and test results pertinent to today's visit. Red flags discussed and indications to present to the Emergency Department. Pathogenesis of diagnosis discussed including typical length and natural progression. Supportive care, natural history, differential diagnoses, and indications for immediate follow-up discussed. Patient expresses understanding and agrees to plan. Patient denies any other questions or concerns.     Follow-up with the primary  care physician for recheck, reevaluation, and consideration of further management.    Please note that this dictation was created using voice recognition software. I have made a reasonable attempt to correct obvious errors, but I expect that there are errors of grammar and possibly content that I did not discover before finalizing the note.    This note was electronically signed by Ricardo Lewis PA-C

## 2023-01-20 ENCOUNTER — OFFICE VISIT (OUTPATIENT)
Dept: MEDICAL GROUP | Facility: PHYSICIAN GROUP | Age: 88
End: 2023-01-20
Payer: MEDICARE

## 2023-01-20 VITALS
TEMPERATURE: 96.7 F | HEART RATE: 74 BPM | SYSTOLIC BLOOD PRESSURE: 134 MMHG | HEIGHT: 59 IN | BODY MASS INDEX: 30 KG/M2 | DIASTOLIC BLOOD PRESSURE: 72 MMHG | RESPIRATION RATE: 20 BRPM | WEIGHT: 148.8 LBS | OXYGEN SATURATION: 96 %

## 2023-01-20 DIAGNOSIS — R20.2 PARESTHESIAS: ICD-10-CM

## 2023-01-20 DIAGNOSIS — I10 ESSENTIAL HYPERTENSION: ICD-10-CM

## 2023-01-20 DIAGNOSIS — I83.893 VARICOSE VEINS OF BILATERAL LOWER EXTREMITIES WITH OTHER COMPLICATIONS: ICD-10-CM

## 2023-01-20 DIAGNOSIS — H61.22 IMPACTED CERUMEN OF LEFT EAR: ICD-10-CM

## 2023-01-20 PROCEDURE — 99214 OFFICE O/P EST MOD 30 MIN: CPT | Performed by: STUDENT IN AN ORGANIZED HEALTH CARE EDUCATION/TRAINING PROGRAM

## 2023-01-20 ASSESSMENT — PATIENT HEALTH QUESTIONNAIRE - PHQ9: CLINICAL INTERPRETATION OF PHQ2 SCORE: 0

## 2023-01-20 ASSESSMENT — FIBROSIS 4 INDEX: FIB4 SCORE: 1.27

## 2023-01-20 NOTE — PROGRESS NOTES
Subjective:     Chief Complaint   Patient presents with    Follow-Up     [facility]     Other     Pt is asking for a DME order for some kind of compression      HPI:   Corrinne presents today for urgent care follow-up.    Encounter from urgent care reviewed 1/13/2023.    Also reports some stinging intermittently in her face and arms without weakness. Feeling a different sensation in ankles, but no pain or numbness or tingling. Occurs with edema.    Used a sleeve and ACE wrap that worked great for edema.    Current Outpatient Medications Ordered in Epic   Medication Sig Dispense Refill    NON SPECIFIED 15-20 mmHg compression stockings (prefer open toe or calf sleeve type), 4 pairs. Duration of use: 99 (lifelong) 4 Each 0    simvastatin (ZOCOR) 20 MG Tab Take 1 Tablet by mouth every evening. 90 Tablet 3    chlorthalidone (HYGROTON) 25 MG Tab Take 1 Tablet by mouth every day. 90 Tablet 0    gabapentin (NEURONTIN) 100 MG Cap       gabapentin (NEURONTIN) 300 MG Cap Take 1 mg by mouth in the morning, at noon, and at bedtime.      vitamin E 180 mg (400 units) Cap Take 180 mg by mouth every day.      Calcium Carb-Cholecalciferol (CALCIUM 1000 + D PO) Take  by mouth.      aspirin (ASA) 81 MG Chew Tab chewable tablet Take 81 mg by mouth every day.      Multiple Vitamin (MULTI-VITAMIN DAILY PO) Take  by mouth.      Cyanocobalamin (VITAMIN B-12) 1000 MCG TABS Take 1,000 mcg by mouth every evening.       No current Epic-ordered facility-administered medications on file.     ROS:  Gen: no fevers/chills, no changes in weight  Eyes: no changes in vision  ENT: no sore throat  Pulm: no sob, no cough  CV: no chest pain, no palpitations  GI: no nausea/vomiting, no diarrhea  MSk: no myalgias  Skin: no rash  Neuro: no headaches  Heme/Lymph: no easy bruising    Objective:     Exam:  /72 (BP Location: Left arm, Patient Position: Sitting, BP Cuff Size: Adult)   Pulse 74   Temp 35.9 °C (96.7 °F) (Temporal)   Resp 20   Ht 1.499 m (4'  "11\")   Wt 67.5 kg (148 lb 12.8 oz)   SpO2 96%   BMI 30.05 kg/m²  Body mass index is 30.05 kg/m².    Physical Exam:  Constitutional: Well-developed and well-nourished. No acute distress.   Skin: Skin is warm and dry. No rash noted.  Head: Atraumatic without lesions.  Eyes: Conjunctivae and extraocular motions are normal. Pupils are equal, round, and reactive to light. No scleral icterus.   Ears:  External ears unremarkable. Tympanic membrane clear and intact on right, blocked by cerumen on left.  Nose: Nares patent. No discharge.  Mouth/Throat: Oropharynx is clear and moist. Posterior pharynx without erythema or exudates.  Neck: Supple, trachea midline.   Cardiovascular: Regular rate and rhythm, S1 and S2 without murmur, rubs, or gallops.  Lungs: Normal inspiratory effort, CTA bilaterally, no wheezes/rhonchi/rales  Extremities: No cyanosis, clubbing, erythema. Trace pitting edema bilat, varicose and spider veins bilat without ttp.   Neurological: Alert and oriented x 3. CNII-XII intact aside from decreased hearing bilat  Psychiatric:  Behavior, mood, and affect are appropriate.    Labs: Reviewed from 9/13/2022, 1/13/2023    Assessment & Plan:     87 y.o. female with the following -     1. Varicose veins of bilateral lower extremities with other complications  Chronic. Discussed ways to prevent, agree with compression. F/u if worsening, has an echo planned in March.  - NON SPECIFIED; 15-20 mmHg compression stockings (prefer open toe or calf sleeve type), 4 pairs. Duration of use: 99 (lifelong)  Dispense: 4 Each; Refill: 0    2. Paresthesias  Sensation in associated with edema in ankles-prevention discussed of edema. On face/arm-unclear. No electrolyte issues. Neuro intact, patient to monitor symptoms, gave ED precautions.     3. Impacted cerumen of left ear  Resolved with irrigation (by Alexandro Hines MA).     4. Essential hypertension  Chronic, controlled-home log reviewed. Continue hydroton 25mg daily. Discussed " ideal ranges/return precautions.    Return in about 7 weeks (around 3/10/2023), or if symptoms worsen or fail to improve.    Please note that this dictation was created using voice recognition software. I have made every reasonable attempt to correct obvious errors, but I expect that there are errors of grammar and possibly content that I did not discover before finalizing the note.

## 2023-01-25 ENCOUNTER — HOSPITAL ENCOUNTER (EMERGENCY)
Facility: MEDICAL CENTER | Age: 88
End: 2023-01-25
Attending: EMERGENCY MEDICINE
Payer: MEDICARE

## 2023-01-25 VITALS
SYSTOLIC BLOOD PRESSURE: 152 MMHG | RESPIRATION RATE: 18 BRPM | HEART RATE: 71 BPM | OXYGEN SATURATION: 96 % | BODY MASS INDEX: 30.84 KG/M2 | TEMPERATURE: 97.7 F | DIASTOLIC BLOOD PRESSURE: 68 MMHG | WEIGHT: 153 LBS | HEIGHT: 59 IN

## 2023-01-25 DIAGNOSIS — R20.2 PINS AND NEEDLES SENSATION: ICD-10-CM

## 2023-01-25 LAB
ANION GAP SERPL CALC-SCNC: 8 MMOL/L (ref 7–16)
BASOPHILS # BLD AUTO: 0.9 % (ref 0–1.8)
BASOPHILS # BLD: 0.06 K/UL (ref 0–0.12)
BUN SERPL-MCNC: 23 MG/DL (ref 8–22)
CALCIUM SERPL-MCNC: 10.3 MG/DL (ref 8.5–10.5)
CHLORIDE SERPL-SCNC: 99 MMOL/L (ref 96–112)
CO2 SERPL-SCNC: 30 MMOL/L (ref 20–33)
CREAT SERPL-MCNC: 0.56 MG/DL (ref 0.5–1.4)
EOSINOPHIL # BLD AUTO: 0.08 K/UL (ref 0–0.51)
EOSINOPHIL NFR BLD: 1.1 % (ref 0–6.9)
ERYTHROCYTE [DISTWIDTH] IN BLOOD BY AUTOMATED COUNT: 43.6 FL (ref 35.9–50)
GFR SERPLBLD CREATININE-BSD FMLA CKD-EPI: 88 ML/MIN/1.73 M 2
GLUCOSE SERPL-MCNC: 109 MG/DL (ref 65–99)
HCT VFR BLD AUTO: 46.6 % (ref 37–47)
HGB BLD-MCNC: 15.4 G/DL (ref 12–16)
IMM GRANULOCYTES # BLD AUTO: 0.02 K/UL (ref 0–0.11)
IMM GRANULOCYTES NFR BLD AUTO: 0.3 % (ref 0–0.9)
LYMPHOCYTES # BLD AUTO: 0.96 K/UL (ref 1–4.8)
LYMPHOCYTES NFR BLD: 13.7 % (ref 22–41)
MCH RBC QN AUTO: 31.1 PG (ref 27–33)
MCHC RBC AUTO-ENTMCNC: 33 G/DL (ref 33.6–35)
MCV RBC AUTO: 94.1 FL (ref 81.4–97.8)
MONOCYTES # BLD AUTO: 0.51 K/UL (ref 0–0.85)
MONOCYTES NFR BLD AUTO: 7.3 % (ref 0–13.4)
NEUTROPHILS # BLD AUTO: 5.4 K/UL (ref 2–7.15)
NEUTROPHILS NFR BLD: 76.7 % (ref 44–72)
NRBC # BLD AUTO: 0 K/UL
NRBC BLD-RTO: 0 /100 WBC
PLATELET # BLD AUTO: 255 K/UL (ref 164–446)
PMV BLD AUTO: 10.2 FL (ref 9–12.9)
POTASSIUM SERPL-SCNC: 3.7 MMOL/L (ref 3.6–5.5)
RBC # BLD AUTO: 4.95 M/UL (ref 4.2–5.4)
SODIUM SERPL-SCNC: 137 MMOL/L (ref 135–145)
WBC # BLD AUTO: 7 K/UL (ref 4.8–10.8)

## 2023-01-25 PROCEDURE — 80048 BASIC METABOLIC PNL TOTAL CA: CPT

## 2023-01-25 PROCEDURE — 85025 COMPLETE CBC W/AUTO DIFF WBC: CPT

## 2023-01-25 PROCEDURE — 99283 EMERGENCY DEPT VISIT LOW MDM: CPT

## 2023-01-25 PROCEDURE — 36415 COLL VENOUS BLD VENIPUNCTURE: CPT

## 2023-01-25 ASSESSMENT — FIBROSIS 4 INDEX: FIB4 SCORE: 1.27

## 2023-01-25 NOTE — ED NOTES
Pt is discharged. VSS. Paperwork explained and all questions answered. All belongings sent with pt upon departure. Pt ambulatory with a steady gait out of ED.

## 2023-01-25 NOTE — ED TRIAGE NOTES
"Corrinne Alice Carmignani  87 y.o. female  Chief Complaint   Patient presents with    Other     Pt states she has had intermittent \"stinging,\" like pain throughout her chin that radiates throughout her body. Denies any other symptoms.        Vitals:    01/25/23 0703   BP: (!) 179/74   Pulse: 88   Resp: 16   Temp: 35.8 °C (96.5 °F)   SpO2: 94%       Patient educated on triage process and encouraged to alert staff of any changes in condition.    Pt ambulatory to triage with the above complaint. Pt states she saw her PCP last week with the same complaint, and was told if she experienced these symptoms again to come to the ER.     "

## 2023-01-25 NOTE — ED PROVIDER NOTES
"ED Provider Note    CHIEF COMPLAINT  Chief Complaint   Patient presents with    Other     Pt states she has had intermittent \"stinging,\" like pain throughout her chin that radiates throughout her body. Denies any other symptoms.        EXTERNAL RECORDS REVIEWED  Outpatient Notes    O/P visit 2023 with same Sx told to come to ER.  Additionally, evaluation of other outpatient encounters note that the patient's been seen for bilateral lower extremity symptoms of similar characteristics.    HPI/ROS  LIMITATION TO HISTORY   Select: : None  OUTSIDE HISTORIAN(S):  None    Corrinne Alice Carmignani is a 87 y.o. female who presents with a chief complaint of what she describes as \"stinging sensation\" in her chin according to triage notes.  However, on discussion with patient, she reports that the pins-and-needles sensation is located in multiple places and is migratory.  It will occasionally be on one side of her face, it is often present over her entire face.  She will have all her symptoms in her bilateral upper extremity.  She states she occasionally has symptoms in her left abdominal wall as well as her bilateral lower extremities.  It has been going on for weeks, likely more than a month.  She does have some association with being at a computer worsening her symptoms.  She states that her  recently  and she has been spending a lot of time on a computer, trying to get their cares in order and information to the family so everybody knows where certain information is located.  Eyes any recent trauma or falls.  No fevers.  No speech difficulties.  No motor deficits.  Her PCP did not advise her of what their concern was for directing her to the ED but there was a mention apparently of electrolytes.  Not anticoagulated.  Additionally, patient sees the pain specialist, Dr. Benitez.  She knows that she has degenerative disc disease throughout her spine.  She sees Emmanuel on a monthly basis and sees him early next " "month, next.  She occasionally has epidural steroid injections which she finds very helpful.  Additionally, she has a prescription for Neurontin which she takes on occasion.  She is always taken it for leg pain, it has not occurred to her, to take it for these symptoms.    PAST MEDICAL HISTORY   has a past medical history of Arthritis, Back pain, Cancer (HCC), Cataract, DDD (degenerative disc disease), lumbar (3/9/2018), Dental disorder, Healthcare maintenance (3/9/2018), Hyperlipidemia, Hypothyroid, Lumbar spondylosis, Peripheral edema (3/9/2018), and Snoring.    SURGICAL HISTORY   has a past surgical history that includes us-needle core bx-breast panel; other (, ); mastectomy (Right, 2016); node biopsy sentinel (Right, 2016); abdominal hysterectomy total (1972); oophorectomy (1977); and tonsillectomy ().    FAMILY HISTORY  Family History   Problem Relation Age of Onset    Heart Disease Mother     Other Father     Drug abuse Son        SOCIAL HISTORY  Social History     Tobacco Use    Smoking status: Former     Packs/day: 0.50     Years: 45.00     Pack years: 22.50     Types: Cigarettes     Quit date: 1998     Years since quittin.0    Smokeless tobacco: Never    Tobacco comments:     avoid all tobacco products   Vaping Use    Vaping Use: Never used   Substance and Sexual Activity    Alcohol use: Yes     Alcohol/week: 0.0 oz     Comment: 7/year    Drug use: No    Sexual activity: Yes     Partners: Male     Comment:  , retired worked for a bank as .        CURRENT MEDICATIONS  Home Medications    **Home medications have not yet been reviewed for this encounter**         ALLERGIES  Allergies   Allergen Reactions    Fosamax [Alendronate Sodium]      Acid reflux        PHYSICAL EXAM  VITAL SIGNS: /76   Pulse 81   Temp 35.8 °C (96.5 °F) (Temporal)   Resp 14   Ht 1.499 m (4' 11\")   Wt 69.4 kg (153 lb)   SpO2 96%   BMI 30.90 kg/m²    Vitals " reviewed.  Constitutional: Patient is oriented to person, place, and time. Appears well-developed and well-nourished. No distress.  Patient sitting up on a gurney, working on a crossword puzzle.  Head: Normocephalic and atraumatic.   Mouth/Throat: Oropharynx is clear and moist, no exudates.   Eyes: Conjunctivae are normal. Pupils are equal, round, and reactive to light.   Neck: Normal range of motion. Neck supple.   Cardiovascular: Normal rate, regular rhythm and normal heart sounds. Normal peripheral pulses.  Pulmonary/Chest: Effort normal and breath sounds normal. No respiratory distress, no wheezes, rhonchi, or rales.   Abdominal: Soft. Bowel sounds are normal. There is no tenderness. No CVA tenderness.  Musculoskeletal: No edema and no tenderness.   Lymphadenopathy: No cervical adenopathy.   Neurological: Patient is alert and oriented to person, place, and time. No cranial nerve deficits. Normal motor and sensory exam. No focal deficits.   Skin: Skin is warm and dry. No erythema. No pallor.   Psychiatric: Patient has a normal mood and affect.       DIAGNOSTIC STUDIES / PROCEDURES      LABS  Results for orders placed or performed during the hospital encounter of 01/25/23   CBC WITH DIFFERENTIAL   Result Value Ref Range    WBC 7.0 4.8 - 10.8 K/uL    RBC 4.95 4.20 - 5.40 M/uL    Hemoglobin 15.4 12.0 - 16.0 g/dL    Hematocrit 46.6 37.0 - 47.0 %    MCV 94.1 81.4 - 97.8 fL    MCH 31.1 27.0 - 33.0 pg    MCHC 33.0 (L) 33.6 - 35.0 g/dL    RDW 43.6 35.9 - 50.0 fL    Platelet Count 255 164 - 446 K/uL    MPV 10.2 9.0 - 12.9 fL    Neutrophils-Polys 76.70 (H) 44.00 - 72.00 %    Lymphocytes 13.70 (L) 22.00 - 41.00 %    Monocytes 7.30 0.00 - 13.40 %    Eosinophils 1.10 0.00 - 6.90 %    Basophils 0.90 0.00 - 1.80 %    Immature Granulocytes 0.30 0.00 - 0.90 %    Nucleated RBC 0.00 /100 WBC    Neutrophils (Absolute) 5.40 2.00 - 7.15 K/uL    Lymphs (Absolute) 0.96 (L) 1.00 - 4.80 K/uL    Monos (Absolute) 0.51 0.00 - 0.85 K/uL     "Eos (Absolute) 0.08 0.00 - 0.51 K/uL    Baso (Absolute) 0.06 0.00 - 0.12 K/uL    Immature Granulocytes (abs) 0.02 0.00 - 0.11 K/uL    NRBC (Absolute) 0.00 K/uL   Basic Metabolic Panel   Result Value Ref Range    Sodium 137 135 - 145 mmol/L    Potassium 3.7 3.6 - 5.5 mmol/L    Chloride 99 96 - 112 mmol/L    Co2 30 20 - 33 mmol/L    Glucose 109 (H) 65 - 99 mg/dL    Bun 23 (H) 8 - 22 mg/dL    Creatinine 0.56 0.50 - 1.40 mg/dL    Calcium 10.3 8.5 - 10.5 mg/dL    Anion Gap 8.0 7.0 - 16.0   ESTIMATED GFR   Result Value Ref Range    GFR (CKD-EPI) 88 >60 mL/min/1.73 m 2         COURSE & MEDICAL DECISION MAKING    ED Observation Status? No; Patient does not meet criteria for ED Observation.     INITIAL ASSESSMENT AND PLAN  Care Narrative: This is a pleasant 87-year-old female.  She appears younger than her stated age.  She is an excellent historian.  She is in no distress.  I have very low suspicion for stroke.  This symptom that she describes as stinging, further described as pins-and-needles, is migratory and involves multiple areas.  It is not one side of her face but frequently involves her entire face, both upper and lower extremities and occasionally the abdominal wall.  I suspect it is likely neuropathic pain and possibly related to her increased activity on the computer the setting of degenerative disc disease.  She is overall well-appearing and nontoxic.  Have ordered labs for further evaluation.    10:15 AM patient is reevaluated at the bedside.  We discussed lab results which are reassuring.  Her electrolytes are normal.  Again on further evaluation, she is again, does not have any motor deficits.  The \"stinging\" further described as pins-and-needles, sensation involves her entire face, both of her upper extremities occasionally her abdominal wall.  Again, this does not follow a nerve distribution and I do not have suspicion for stroke.  I have a high suspicion that this is likely related to neuropathic pain in " the setting of degenerative disc disease.  She can use her Neurontin.  She is encouraged to follow-up with her primary as well as her pain management doctor, Dr. Benitez.  We discussed lab results again, these are reassuring.  She has reassuring vital signs.  At this point, I feel she can safely be discharged home and she is agreeable.    ADDITIONAL PROBLEM LIST AND DISPOSITION    Escalation of care considered, and ultimately not performed:diagnostic imaging    Barriers to care at this time, including but not limited to:  None .     Decision tools and prescription drugs considered including, but not limited to: Pain Medications and possibly steroids .    FINAL DIAGNOSIS  1. Pins and needles sensation    Migratory-entire face, bilateral upper and lower extremities, abdominal wall           Electronically signed by: Palak Bennett D.O., 1/25/2023 8:32 AM

## 2023-02-20 DIAGNOSIS — I10 ESSENTIAL HYPERTENSION: ICD-10-CM

## 2023-02-22 RX ORDER — CHLORTHALIDONE 25 MG/1
TABLET ORAL
Qty: 90 TABLET | Refills: 3 | Status: SHIPPED | OUTPATIENT
Start: 2023-02-22 | End: 2024-01-25

## 2023-03-02 ENCOUNTER — HOSPITAL ENCOUNTER (OUTPATIENT)
Dept: CARDIOLOGY | Facility: MEDICAL CENTER | Age: 88
End: 2023-03-02
Attending: STUDENT IN AN ORGANIZED HEALTH CARE EDUCATION/TRAINING PROGRAM
Payer: MEDICARE

## 2023-03-02 DIAGNOSIS — I51.7 MILD LEFT VENTRICULAR HYPERTROPHY: ICD-10-CM

## 2023-03-02 DIAGNOSIS — I10 ESSENTIAL HYPERTENSION: ICD-10-CM

## 2023-03-02 DIAGNOSIS — I35.8 AORTIC VALVE SCLEROSIS: ICD-10-CM

## 2023-03-02 DIAGNOSIS — R60.0 BILATERAL LOWER EXTREMITY EDEMA: ICD-10-CM

## 2023-03-02 LAB
LV EJECT FRACT  99904: 65
LV EJECT FRACT MOD 2C 99903: 65.6
LV EJECT FRACT MOD 4C 99902: 67.69
LV EJECT FRACT MOD BP 99901: 67.39

## 2023-03-02 PROCEDURE — 93306 TTE W/DOPPLER COMPLETE: CPT | Mod: 26 | Performed by: INTERNAL MEDICINE

## 2023-03-02 PROCEDURE — 93306 TTE W/DOPPLER COMPLETE: CPT

## 2023-03-10 ENCOUNTER — OFFICE VISIT (OUTPATIENT)
Dept: MEDICAL GROUP | Facility: PHYSICIAN GROUP | Age: 88
End: 2023-03-10
Payer: MEDICARE

## 2023-03-10 ENCOUNTER — HOSPITAL ENCOUNTER (OUTPATIENT)
Dept: LAB | Facility: MEDICAL CENTER | Age: 88
End: 2023-03-10
Attending: STUDENT IN AN ORGANIZED HEALTH CARE EDUCATION/TRAINING PROGRAM
Payer: MEDICARE

## 2023-03-10 VITALS
SYSTOLIC BLOOD PRESSURE: 138 MMHG | RESPIRATION RATE: 20 BRPM | HEART RATE: 71 BPM | OXYGEN SATURATION: 96 % | HEIGHT: 59 IN | DIASTOLIC BLOOD PRESSURE: 74 MMHG | TEMPERATURE: 96.8 F | BODY MASS INDEX: 31.04 KG/M2 | WEIGHT: 154 LBS

## 2023-03-10 DIAGNOSIS — I35.8 AORTIC VALVE SCLEROSIS: ICD-10-CM

## 2023-03-10 DIAGNOSIS — E78.2 MIXED HYPERLIPIDEMIA: ICD-10-CM

## 2023-03-10 DIAGNOSIS — I10 ESSENTIAL HYPERTENSION: ICD-10-CM

## 2023-03-10 DIAGNOSIS — I51.7 MILD LEFT VENTRICULAR HYPERTROPHY: ICD-10-CM

## 2023-03-10 DIAGNOSIS — M85.89 OSTEOPENIA OF MULTIPLE SITES: ICD-10-CM

## 2023-03-10 DIAGNOSIS — I35.0 AORTIC STENOSIS, MILD: ICD-10-CM

## 2023-03-10 DIAGNOSIS — I83.893 VARICOSE VEINS OF BILATERAL LOWER EXTREMITIES WITH OTHER COMPLICATIONS: ICD-10-CM

## 2023-03-10 DIAGNOSIS — R20.2 PARESTHESIAS: ICD-10-CM

## 2023-03-10 DIAGNOSIS — R60.0 BILATERAL LOWER EXTREMITY EDEMA: ICD-10-CM

## 2023-03-10 PROBLEM — R73.03 PREDIABETES: Status: RESOLVED | Noted: 2020-09-29 | Resolved: 2023-03-10

## 2023-03-10 LAB
25(OH)D3 SERPL-MCNC: 60 NG/ML (ref 30–100)
ALBUMIN SERPL BCP-MCNC: 4.1 G/DL (ref 3.2–4.9)
ALBUMIN/GLOB SERPL: 1.5 G/DL
ALP SERPL-CCNC: 59 U/L (ref 30–99)
ALT SERPL-CCNC: 16 U/L (ref 2–50)
ANION GAP SERPL CALC-SCNC: 8 MMOL/L (ref 7–16)
AST SERPL-CCNC: 15 U/L (ref 12–45)
BASOPHILS # BLD AUTO: 0.7 % (ref 0–1.8)
BASOPHILS # BLD: 0.04 K/UL (ref 0–0.12)
BILIRUB SERPL-MCNC: 0.8 MG/DL (ref 0.1–1.5)
BUN SERPL-MCNC: 22 MG/DL (ref 8–22)
CALCIUM ALBUM COR SERPL-MCNC: 9.5 MG/DL (ref 8.5–10.5)
CALCIUM SERPL-MCNC: 9.6 MG/DL (ref 8.5–10.5)
CHLORIDE SERPL-SCNC: 97 MMOL/L (ref 96–112)
CHOLEST SERPL-MCNC: 163 MG/DL (ref 100–199)
CO2 SERPL-SCNC: 31 MMOL/L (ref 20–33)
CREAT SERPL-MCNC: 0.61 MG/DL (ref 0.5–1.4)
EOSINOPHIL # BLD AUTO: 0.13 K/UL (ref 0–0.51)
EOSINOPHIL NFR BLD: 2.3 % (ref 0–6.9)
ERYTHROCYTE [DISTWIDTH] IN BLOOD BY AUTOMATED COUNT: 47 FL (ref 35.9–50)
ERYTHROCYTE [SEDIMENTATION RATE] IN BLOOD BY WESTERGREN METHOD: 12 MM/HOUR (ref 0–25)
GFR SERPLBLD CREATININE-BSD FMLA CKD-EPI: 86 ML/MIN/1.73 M 2
GLOBULIN SER CALC-MCNC: 2.8 G/DL (ref 1.9–3.5)
GLUCOSE SERPL-MCNC: 82 MG/DL (ref 65–99)
HCT VFR BLD AUTO: 45.2 % (ref 37–47)
HDLC SERPL-MCNC: 58 MG/DL
HGB BLD-MCNC: 14.5 G/DL (ref 12–16)
IMM GRANULOCYTES # BLD AUTO: 0.02 K/UL (ref 0–0.11)
IMM GRANULOCYTES NFR BLD AUTO: 0.4 % (ref 0–0.9)
LDLC SERPL CALC-MCNC: 89 MG/DL
LYMPHOCYTES # BLD AUTO: 0.96 K/UL (ref 1–4.8)
LYMPHOCYTES NFR BLD: 17.1 % (ref 22–41)
MCH RBC QN AUTO: 31 PG (ref 27–33)
MCHC RBC AUTO-ENTMCNC: 32.1 G/DL (ref 33.6–35)
MCV RBC AUTO: 96.8 FL (ref 81.4–97.8)
MONOCYTES # BLD AUTO: 0.72 K/UL (ref 0–0.85)
MONOCYTES NFR BLD AUTO: 12.9 % (ref 0–13.4)
NEUTROPHILS # BLD AUTO: 3.73 K/UL (ref 2–7.15)
NEUTROPHILS NFR BLD: 66.6 % (ref 44–72)
NRBC # BLD AUTO: 0 K/UL
NRBC BLD-RTO: 0 /100 WBC
PLATELET # BLD AUTO: 238 K/UL (ref 164–446)
PMV BLD AUTO: 11 FL (ref 9–12.9)
POTASSIUM SERPL-SCNC: 3.8 MMOL/L (ref 3.6–5.5)
PROT SERPL-MCNC: 6.9 G/DL (ref 6–8.2)
RBC # BLD AUTO: 4.67 M/UL (ref 4.2–5.4)
SODIUM SERPL-SCNC: 136 MMOL/L (ref 135–145)
TRIGL SERPL-MCNC: 80 MG/DL (ref 0–149)
TSH SERPL DL<=0.005 MIU/L-ACNC: 3.86 UIU/ML (ref 0.38–5.33)
VIT B12 SERPL-MCNC: 3746 PG/ML (ref 211–911)
WBC # BLD AUTO: 5.6 K/UL (ref 4.8–10.8)

## 2023-03-10 PROCEDURE — 82784 ASSAY IGA/IGD/IGG/IGM EACH: CPT

## 2023-03-10 PROCEDURE — 86038 ANTINUCLEAR ANTIBODIES: CPT

## 2023-03-10 PROCEDURE — 84155 ASSAY OF PROTEIN SERUM: CPT | Mod: XU

## 2023-03-10 PROCEDURE — 82607 VITAMIN B-12: CPT

## 2023-03-10 PROCEDURE — 80053 COMPREHEN METABOLIC PANEL: CPT

## 2023-03-10 PROCEDURE — 36415 COLL VENOUS BLD VENIPUNCTURE: CPT | Mod: GA

## 2023-03-10 PROCEDURE — 85025 COMPLETE CBC W/AUTO DIFF WBC: CPT

## 2023-03-10 PROCEDURE — 85652 RBC SED RATE AUTOMATED: CPT

## 2023-03-10 PROCEDURE — 84165 PROTEIN E-PHORESIS SERUM: CPT

## 2023-03-10 PROCEDURE — 80061 LIPID PANEL: CPT

## 2023-03-10 PROCEDURE — 82306 VITAMIN D 25 HYDROXY: CPT | Mod: GA

## 2023-03-10 PROCEDURE — 86334 IMMUNOFIX E-PHORESIS SERUM: CPT

## 2023-03-10 PROCEDURE — 84443 ASSAY THYROID STIM HORMONE: CPT

## 2023-03-10 PROCEDURE — 99214 OFFICE O/P EST MOD 30 MIN: CPT | Performed by: STUDENT IN AN ORGANIZED HEALTH CARE EDUCATION/TRAINING PROGRAM

## 2023-03-10 RX ORDER — LEVOTHYROXINE SODIUM 0.03 MG/1
TABLET ORAL
COMMUNITY
Start: 2023-01-24 | End: 2023-08-31

## 2023-03-10 ASSESSMENT — FIBROSIS 4 INDEX: FIB4 SCORE: 1.16

## 2023-03-10 NOTE — PROGRESS NOTES
Subjective:     Chief Complaint   Patient presents with    Follow-Up     HPI:   Corrinne presents today for follow up.    Paresthesias  Occurring since January of this year.  Intermittent stinging pain without numbness tingling or weakness.  At times will occur on part of the face but most often bilateral and alternates from both arms to one-sided the other.  On occasion will have in other areas such as the abdomen or legs. Not every day.  Denies headache.  Takes gabapentin prn for back pain and finds this to be helpful for this as well.    Patient denies any chest pain, shortness of breath, cough or orthopnea.  Has stable lower extremity edema and did purchase some over-the-counter compression which is helping.  Keeps a close log of her blood pressure at home and reports always in range.  Continues with chlorthalidone 25 mg daily.    Current Outpatient Medications Ordered in Epic   Medication Sig Dispense Refill    levothyroxine (SYNTHROID) 25 MCG Tab       chlorthalidone (HYGROTON) 25 MG Tab TAKE 1 TABLET BY MOUTH DAILY 90 Tablet 3    NON SPECIFIED 15-20 mmHg compression stockings (prefer open toe or calf sleeve type), 4 pairs. Duration of use: 99 (lifelong) 4 Each 0    simvastatin (ZOCOR) 20 MG Tab Take 1 Tablet by mouth every evening. 90 Tablet 3    gabapentin (NEURONTIN) 100 MG Cap       vitamin E 180 mg (400 units) Cap Take 180 mg by mouth every day.      Calcium Carb-Cholecalciferol (CALCIUM 1000 + D PO) Take  by mouth.      aspirin (ASA) 81 MG Chew Tab chewable tablet Take 81 mg by mouth every day.      Multiple Vitamin (MULTI-VITAMIN DAILY PO) Take  by mouth.      Cyanocobalamin (VITAMIN B-12) 1000 MCG TABS Take 1,000 mcg by mouth every evening.       No current Epic-ordered facility-administered medications on file.     ROS:  Gen: no fevers/chills, no changes in weight  Eyes: no changes in vision  ENT: no sore throat  Pulm: no sob, no cough  CV: no chest pain, no palpitations  GI: no nausea/vomiting, no  "diarrhea  MSk: no myalgias  Skin: no rash  Neuro: no headaches, no numbness/tingling  Heme/Lymph: no easy bruising    Objective:     Exam:  /74 (BP Location: Left arm, Patient Position: Sitting, BP Cuff Size: Small adult)   Pulse 71   Temp 36 °C (96.8 °F) (Temporal)   Resp 20   Ht 1.499 m (4' 11\")   Wt 69.9 kg (154 lb)   SpO2 96%   BMI 31.10 kg/m²  Body mass index is 31.1 kg/m².    Physical Exam:  Constitutional: Well-developed and well-nourished. No acute distress.   Skin: Skin is warm and dry. No rash noted.  Head: Atraumatic without lesions.  Eyes: Conjunctivae and extraocular motions are normal. Pupils are equal, round. No scleral icterus.   Mouth/Throat: Wearing mask.  Neck: Supple, trachea midline.   Cardiovascular: Regular rate and rhythm, S1 and S2 without murmur, rubs, or gallops.  Lungs: Normal inspiratory effort, CTA bilaterally, no wheezes/rhonchi/rales  Abdomen: Soft, non tender, and without distention. Active bowel sounds. No rebound, guarding, masses or HSM.  Extremities: No edema, wearing compression.  Neurological: Alert and oriented x 3. No gross/focal deficits. Full exam deferred (see last visit).  Psychiatric:  Behavior, mood, and affect are appropriate.    Labs: Reviewed from 1/13/2023, 1/25/2023  Imaging: Reviewed from echo 3/2/2023    Assessment & Plan:     87 y.o. female with the following -     1. Aortic valve sclerosis  2. Aortic stenosis, mild  3. Mild left ventricular hypertrophy  Reviewed echo results with patient. Asymptomatic, monitor for symptoms.     4. Essential hypertension  Chronic, fair control, discussed ideal ranges. Continue hydroton 25mg. Discussed ideal ranges/return precautions. Trend at f/u.  - CBC WITH DIFFERENTIAL; Future  - Comp Metabolic Panel; Future    5. Varicose veins of bilateral lower extremities with other complications  6. Bilateral lower extremity edema  Chronic, well controlled with compression. Advised low Na diet and continued use of " compression.    7. Paresthesias  Stable, but persistent. Evaluate further with labs/imaging as below.  - TSH WITH REFLEX TO FT4; Future  - VITAMIN B12; Future  - SPEP W/REFLEX TO GADIEL, A, G, M; Future  - Sed Rate; Future  - STIVEN ANTIBODY WITH REFLEX; Future  - CBC WITH DIFFERENTIAL; Future  - Comp Metabolic Panel; Future  - MR-BRAIN-W/O; Future    8. Mixed hyperlipidemia  - Lipid Profile; Future  - Comp Metabolic Panel; Future    9. Osteopenia of multiple sites  - VITAMIN D,25 HYDROXY (DEFICIENCY); Future    Return in about 4 weeks (around 4/7/2023), or if symptoms worsen or fail to improve, for lab review/BP, Blood pressure.    Please note that this dictation was created using voice recognition software. I have made every reasonable attempt to correct obvious errors, but I expect that there are errors of grammar and possibly content that I did not discover before finalizing the note.

## 2023-03-10 NOTE — ASSESSMENT & PLAN NOTE
Occurring since January of this year.  Intermittent stinging pain without numbness tingling or weakness.  At times will occur on part of the face but most often bilateral and alternates from both arms to one-sided the other.  On occasion will have in other areas such as the abdomen or legs. Not every day.  Denies headache.  Takes gabapentin prn for back pain and finds this to be helpful for this as well.

## 2023-03-12 LAB — NUCLEAR IGG SER QL IA: NORMAL

## 2023-03-14 LAB
ALBUMIN SERPL ELPH-MCNC: 3.68 G/DL (ref 3.75–5.01)
ALPHA1 GLOB SERPL ELPH-MCNC: 0.28 G/DL (ref 0.19–0.46)
ALPHA2 GLOB SERPL ELPH-MCNC: 0.84 G/DL (ref 0.48–1.05)
B-GLOBULIN SERPL ELPH-MCNC: 0.59 G/DL (ref 0.48–1.1)
GAMMA GLOB SERPL ELPH-MCNC: 1.12 G/DL (ref 0.62–1.51)
IGA SERPL-MCNC: 76 MG/DL (ref 68–408)
IGG SERPL-MCNC: 1114 MG/DL (ref 768–1632)
IGM SERPL-MCNC: 49 MG/DL (ref 35–263)
INTERPRETATION SERPL IFE-IMP: ABNORMAL
MONOCLON BAND OBS SERPL: ABNORMAL
MONOCLONAL PROTEIN NL11656: 0.73 G/DL
PATHOLOGY STUDY: ABNORMAL
PROT SERPL-MCNC: 6.5 G/DL (ref 6.3–8.2)

## 2023-03-23 ENCOUNTER — APPOINTMENT (OUTPATIENT)
Dept: RADIOLOGY | Facility: MEDICAL CENTER | Age: 88
End: 2023-03-23
Attending: STUDENT IN AN ORGANIZED HEALTH CARE EDUCATION/TRAINING PROGRAM
Payer: MEDICARE

## 2023-03-30 RX ORDER — SIMVASTATIN 20 MG
TABLET ORAL
Qty: 90 TABLET | Refills: 3 | OUTPATIENT
Start: 2023-03-30

## 2023-04-07 ENCOUNTER — HOSPITAL ENCOUNTER (OUTPATIENT)
Dept: RADIOLOGY | Facility: MEDICAL CENTER | Age: 88
End: 2023-04-07
Attending: STUDENT IN AN ORGANIZED HEALTH CARE EDUCATION/TRAINING PROGRAM
Payer: MEDICARE

## 2023-04-07 DIAGNOSIS — R20.2 PARESTHESIAS: ICD-10-CM

## 2023-04-07 PROCEDURE — 70551 MRI BRAIN STEM W/O DYE: CPT

## 2023-04-14 ENCOUNTER — OFFICE VISIT (OUTPATIENT)
Dept: MEDICAL GROUP | Facility: PHYSICIAN GROUP | Age: 88
End: 2023-04-14
Payer: MEDICARE

## 2023-04-14 ENCOUNTER — E-CONSULT (OUTPATIENT)
Dept: HEMATOLOGY ONCOLOGY | Facility: MEDICAL CENTER | Age: 88
End: 2023-04-14

## 2023-04-14 VITALS
HEART RATE: 70 BPM | DIASTOLIC BLOOD PRESSURE: 62 MMHG | OXYGEN SATURATION: 94 % | BODY MASS INDEX: 28.83 KG/M2 | WEIGHT: 143 LBS | RESPIRATION RATE: 20 BRPM | SYSTOLIC BLOOD PRESSURE: 122 MMHG | HEIGHT: 59 IN | TEMPERATURE: 98.3 F

## 2023-04-14 DIAGNOSIS — R20.2 PARESTHESIAS: ICD-10-CM

## 2023-04-14 DIAGNOSIS — E03.9 HYPOTHYROIDISM, UNSPECIFIED TYPE: ICD-10-CM

## 2023-04-14 DIAGNOSIS — R74.8 ELEVATED VITAMIN B12 LEVEL: ICD-10-CM

## 2023-04-14 DIAGNOSIS — R77.8 ABNORMAL SPEP: ICD-10-CM

## 2023-04-14 DIAGNOSIS — G31.9 CEREBRAL ATROPHY (HCC): ICD-10-CM

## 2023-04-14 DIAGNOSIS — E78.2 MIXED HYPERLIPIDEMIA: ICD-10-CM

## 2023-04-14 DIAGNOSIS — Z71.9 ENCOUNTER FOR CONSULTATION: ICD-10-CM

## 2023-04-14 PROBLEM — R79.89 ELEVATED VITAMIN B12 LEVEL: Status: ACTIVE | Noted: 2023-04-14

## 2023-04-14 PROCEDURE — 99214 OFFICE O/P EST MOD 30 MIN: CPT | Performed by: STUDENT IN AN ORGANIZED HEALTH CARE EDUCATION/TRAINING PROGRAM

## 2023-04-14 PROCEDURE — 99451 NTRPROF PH1/NTRNET/EHR 5/>: CPT | Performed by: INTERNAL MEDICINE

## 2023-04-14 ASSESSMENT — FIBROSIS 4 INDEX: FIB4 SCORE: 1.37

## 2023-04-14 NOTE — PROGRESS NOTES
Subjective:     Chief Complaint   Patient presents with    Lab Results    Follow-Up     HPI:   Corrinne presents today for follow up, lab results and imaging review.    Paresthesias  Chronic, less frequent than prior.  She reports that she was looking through some old records and noted that she went to the ED in 2000 while living in [place]. The symptoms have not changed from prior-still intermittent stinging pain without numbness tingling or weakness.  At times will occur on part of the face but most often bilateral and alternates from both arms to one-sided the other.  On occasion will have in other areas such as the abdomen or legs. Denies headache.  Takes gabapentin prn for back pain and finds this to be helpful for this as well.    Current Outpatient Medications Ordered in Epic   Medication Sig Dispense Refill    levothyroxine (SYNTHROID) 25 MCG Tab       chlorthalidone (HYGROTON) 25 MG Tab TAKE 1 TABLET BY MOUTH DAILY 90 Tablet 3    NON SPECIFIED 15-20 mmHg compression stockings (prefer open toe or calf sleeve type), 4 pairs. Duration of use: 99 (lifelong) 4 Each 0    simvastatin (ZOCOR) 20 MG Tab Take 1 Tablet by mouth every evening. 90 Tablet 3    gabapentin (NEURONTIN) 100 MG Cap       vitamin E 180 mg (400 units) Cap Take 180 mg by mouth every day.      aspirin (ASA) 81 MG Chew Tab chewable tablet Take 81 mg by mouth every day.      Multiple Vitamin (MULTI-VITAMIN DAILY PO) Take  by mouth.       No current Epic-ordered facility-administered medications on file.     ROS:  Gen: no fevers/chills, no changes in weight  Eyes: no changes in vision  ENT: no sore throat  Pulm: no sob, no cough  CV: no chest pain, no palpitations  GI: no nausea/vomiting, no diarrhea  MSk: no myalgias  Skin: no rash  Neuro: no headaches  Heme/Lymph: no easy bruising    Objective:     Exam:  /62 (BP Location: Right arm, Patient Position: Sitting, BP Cuff Size: Adult)   Pulse 70   Temp 36.8 °C (98.3 °F) (Temporal)   Resp 20   Ht  "1.499 m (4' 11\")   Wt 64.9 kg (143 lb)   SpO2 94%   BMI 28.88 kg/m²  Body mass index is 28.88 kg/m².    Physical Exam:  Constitutional: Well-developed and well-nourished. No acute distress.   Skin: Skin is warm and dry. No rash noted.  Head: Atraumatic without lesions.  Eyes: Conjunctivae and extraocular motions are normal. Pupils are equal, round. No scleral icterus.   Nose: Nares patent.  No discharge.  Mouth/Throat: Oropharynx is moist.   Neck: Supple, trachea midline.   Lungs: Normal inspiratory effort  Neurological: Alert and oriented x 3. No gross/focal deficits.  Psychiatric:  Behavior, mood, and affect are appropriate.    Labs: Reviewed from 3/10/2023  Imaging: Reviewed MRI brain 4/7/2023    Assessment & Plan:     87 y.o. female with the following -     1. Paresthesias  Chronic, less frequent than prior.  Reviewed most recent labs and brain MRI, see #3 for discussion.  Patient reports that she does have benefit from as needed gabapentin 100 mg once daily for occasional back pain and this seems to also improve her paresthesias/stinging pain.  Okay to continue gabapentin as needed.  Agrees to neurology referral as etiology at this time is unclear.  Aware of ER precautions.  - Referral to Neurology    2. Cerebral atrophy (HCC)  New finding, age-related.  Monitor clinically.    3. 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  Other labs reassuring.  Plan to trend labs as below in 3 months, E consult sent to oncology/hematology regarding if any further evaluation is recommended or if monitoring is sufficient.  - E-consult to Hematology/Oncology  - SPEP W/REFLEX TO GADIEL, A, G, M; Future  - CBC WITH DIFFERENTIAL; Future  - Comp Metabolic Panel; Future  ____  Trend in 3m, added light chains in addition as recommended by heme/onc.  - FREE K&L LT CHAINS, QT, SERUM; Future    4. Elevated vitamin B12 level  New finding, patient has stopped her " B12 supplement and continues with multivitamin.  Trend with next labs.  - VITAMIN B12; Future    5. Hypothyroidism, unspecified type  Chronic, well controlled. Continue medication(s) as below.    levothyroxine (SYNTHROID) 25 MCG daily    6. Mixed hyperlipidemia  Chronic, well controlled. Continue medication(s) as below.    simvastatin (ZOCOR) 20 MG Tab, Take 1 Tablet by mouth every evening., Disp: 90 Tablet, Rfl: 3    aspirin (ASA) 81 MG daily.    Return in about 2 months (around 6/14/2023), or if symptoms worsen or fail to improve, for DMV paperwork.    Please note that this dictation was created using voice recognition software. I have made every reasonable attempt to correct obvious errors, but I expect that there are errors of grammar and possibly content that I did not discover before finalizing the note.

## 2023-06-16 ENCOUNTER — OFFICE VISIT (OUTPATIENT)
Dept: MEDICAL GROUP | Facility: PHYSICIAN GROUP | Age: 88
End: 2023-06-16
Payer: MEDICARE

## 2023-06-16 VITALS
DIASTOLIC BLOOD PRESSURE: 60 MMHG | OXYGEN SATURATION: 94 % | SYSTOLIC BLOOD PRESSURE: 136 MMHG | RESPIRATION RATE: 20 BRPM | HEART RATE: 70 BPM | WEIGHT: 148 LBS | TEMPERATURE: 97.7 F | BODY MASS INDEX: 29.84 KG/M2 | HEIGHT: 59 IN

## 2023-06-16 DIAGNOSIS — I10 ESSENTIAL HYPERTENSION: ICD-10-CM

## 2023-06-16 DIAGNOSIS — M25.561 ACUTE PAIN OF RIGHT KNEE: ICD-10-CM

## 2023-06-16 DIAGNOSIS — Z02.89 ENCOUNTER FOR COMPLETION OF FORM WITH PATIENT: ICD-10-CM

## 2023-06-16 DIAGNOSIS — M17.11 PRIMARY OSTEOARTHRITIS OF RIGHT KNEE: ICD-10-CM

## 2023-06-16 DIAGNOSIS — Z99.89 USE OF CANE AS AMBULATORY AID: ICD-10-CM

## 2023-06-16 PROCEDURE — 99214 OFFICE O/P EST MOD 30 MIN: CPT | Performed by: STUDENT IN AN ORGANIZED HEALTH CARE EDUCATION/TRAINING PROGRAM

## 2023-06-16 PROCEDURE — 3078F DIAST BP <80 MM HG: CPT | Performed by: STUDENT IN AN ORGANIZED HEALTH CARE EDUCATION/TRAINING PROGRAM

## 2023-06-16 PROCEDURE — 3075F SYST BP GE 130 - 139MM HG: CPT | Performed by: STUDENT IN AN ORGANIZED HEALTH CARE EDUCATION/TRAINING PROGRAM

## 2023-06-16 ASSESSMENT — ENCOUNTER SYMPTOMS
ABDOMINAL PAIN: 0
NAUSEA: 0
WEIGHT LOSS: 0
DIARRHEA: 0
FEVER: 0
VOMITING: 0
PALPITATIONS: 0
CHILLS: 0
COUGH: 0
SHORTNESS OF BREATH: 0

## 2023-06-16 ASSESSMENT — FIBROSIS 4 INDEX: FIB4 SCORE: 1.37

## 2023-06-16 NOTE — PROGRESS NOTES
"Subjective:     Chief Complaint   Patient presents with    Paperwork     HPI:   Corrinne presents today for DMV paperwork.    Patient requesting DMV paperwork completion.  Reports that she on occasion does need to use a cane.    Had acute right knee pain after walking for 2.5hrs straight.  Denies history of knee pain prior.  Went to [facility], had a CSI 6/2/2023. Bought Voltaren gel-helping. Using compression and a brace.  Reports improvement and has a follow-up at the end of June with an orthopedist.    States blood pressure well controlled at home.    Review of Systems   Constitutional:  Negative for chills, fever, malaise/fatigue and weight loss.   Respiratory:  Negative for cough and shortness of breath.    Cardiovascular:  Negative for chest pain, palpitations and leg swelling.   Gastrointestinal:  Negative for abdominal pain, diarrhea, nausea and vomiting.   Musculoskeletal:  Positive for joint pain.   Skin:  Negative for rash.     Objective:     Exam:  /60 (BP Location: Right arm, Patient Position: Sitting, BP Cuff Size: Adult)   Pulse 70   Temp 36.5 °C (97.7 °F) (Temporal)   Resp 20   Ht 1.499 m (4' 11\")   Wt 67.1 kg (148 lb)   SpO2 94%   BMI 29.89 kg/m²  Body mass index is 29.89 kg/m².    Physical Exam  Vitals reviewed.   Constitutional:       General: She is not in acute distress.     Appearance: Normal appearance. She is not ill-appearing.   HENT:      Head: Normocephalic and atraumatic.      Nose: Nose normal.      Mouth/Throat:      Mouth: Mucous membranes are moist.   Eyes:      Conjunctiva/sclera: Conjunctivae normal.   Cardiovascular:      Rate and Rhythm: Normal rate and regular rhythm.      Heart sounds: Normal heart sounds. No murmur heard.  Pulmonary:      Effort: Pulmonary effort is normal. No respiratory distress.      Breath sounds: Normal breath sounds. No stridor. No wheezing, rhonchi or rales.   Musculoskeletal:      Cervical back: Normal range of motion and neck supple. No rigidity " or tenderness.      Right lower leg: No edema.      Left lower leg: No edema.      Comments: Wearing compression above the ankles bilat. Wearing right knee brace.   Neurological:      General: No focal deficit present.      Mental Status: She is alert and oriented to person, place, and time.   Psychiatric:         Mood and Affect: Mood normal.         Behavior: Behavior normal.         Thought Content: Thought content normal.       Imaging: Reviewed from right knee 6/2/2023    Assessment & Plan:     87 y.o. female with the following -     1. Encounter for completion of form with patient  2. Use of cane as ambulatory aid  DMV paperwork completed and returned to patient.    3. Acute pain of right knee  4. Primary osteoarthritis of right knee  Acute, improved somewhat with CSI, compression, rest and ice as well as Voltaren gel.  Patient only using once to twice a day, can increase to up to 4 times a day as below.  Continue care with orthopedics.  - diclofenac sodium (VOLTAREN) 1 % Gel; Apply 4 g topically 4 times a day as needed (knee pain (max 32g/day)).  Dispense: 100 g; Refill: 3    5. Essential hypertension  Chronic, improved on repeat. Continue below medication(s). Discussed ideal ranges/return precautions and ways to improve with lifestyle choices.    chlorthalidone (HYGROTON) 25 MG Tab, TAKE 1 TABLET BY MOUTH DAILY, Disp: 90 Tablet, Rfl: 3    Return in about 2 months (around 8/16/2023), or if symptoms worsen or fail to improve, for lab review.    Please note that this dictation was created using voice recognition software. I have made every reasonable attempt to correct obvious errors, but I expect that there are errors of grammar and possibly content that I did not discover before finalizing the note.

## 2023-07-07 ENCOUNTER — HOSPITAL ENCOUNTER (OUTPATIENT)
Dept: LAB | Facility: MEDICAL CENTER | Age: 88
End: 2023-07-07
Attending: STUDENT IN AN ORGANIZED HEALTH CARE EDUCATION/TRAINING PROGRAM
Payer: MEDICARE

## 2023-07-07 DIAGNOSIS — R74.8 ELEVATED VITAMIN B12 LEVEL: ICD-10-CM

## 2023-07-07 DIAGNOSIS — R77.8 ABNORMAL SPEP: ICD-10-CM

## 2023-07-07 LAB
ALBUMIN SERPL BCP-MCNC: 3.7 G/DL (ref 3.2–4.9)
ALBUMIN/GLOB SERPL: 1.3 G/DL
ALP SERPL-CCNC: 60 U/L (ref 30–99)
ALT SERPL-CCNC: 18 U/L (ref 2–50)
ANION GAP SERPL CALC-SCNC: 10 MMOL/L (ref 7–16)
AST SERPL-CCNC: 17 U/L (ref 12–45)
BASOPHILS # BLD AUTO: 1.1 % (ref 0–1.8)
BASOPHILS # BLD: 0.06 K/UL (ref 0–0.12)
BILIRUB SERPL-MCNC: 0.8 MG/DL (ref 0.1–1.5)
BUN SERPL-MCNC: 25 MG/DL (ref 8–22)
CALCIUM ALBUM COR SERPL-MCNC: 10.6 MG/DL (ref 8.5–10.5)
CALCIUM SERPL-MCNC: 10.4 MG/DL (ref 8.5–10.5)
CHLORIDE SERPL-SCNC: 102 MMOL/L (ref 96–112)
CO2 SERPL-SCNC: 31 MMOL/L (ref 20–33)
CREAT SERPL-MCNC: 0.68 MG/DL (ref 0.5–1.4)
EOSINOPHIL # BLD AUTO: 0.22 K/UL (ref 0–0.51)
EOSINOPHIL NFR BLD: 4 % (ref 0–6.9)
ERYTHROCYTE [DISTWIDTH] IN BLOOD BY AUTOMATED COUNT: 46.1 FL (ref 35.9–50)
GFR SERPLBLD CREATININE-BSD FMLA CKD-EPI: 84 ML/MIN/1.73 M 2
GLOBULIN SER CALC-MCNC: 2.9 G/DL (ref 1.9–3.5)
GLUCOSE SERPL-MCNC: 87 MG/DL (ref 65–99)
HCT VFR BLD AUTO: 44.3 % (ref 37–47)
HGB BLD-MCNC: 14.4 G/DL (ref 12–16)
IMM GRANULOCYTES # BLD AUTO: 0.01 K/UL (ref 0–0.11)
IMM GRANULOCYTES NFR BLD AUTO: 0.2 % (ref 0–0.9)
LYMPHOCYTES # BLD AUTO: 1.23 K/UL (ref 1–4.8)
LYMPHOCYTES NFR BLD: 22.6 % (ref 22–41)
MCH RBC QN AUTO: 31 PG (ref 27–33)
MCHC RBC AUTO-ENTMCNC: 32.5 G/DL (ref 32.2–35.5)
MCV RBC AUTO: 95.3 FL (ref 81.4–97.8)
MONOCYTES # BLD AUTO: 0.72 K/UL (ref 0–0.85)
MONOCYTES NFR BLD AUTO: 13.2 % (ref 0–13.4)
NEUTROPHILS # BLD AUTO: 3.2 K/UL (ref 1.82–7.42)
NEUTROPHILS NFR BLD: 58.9 % (ref 44–72)
NRBC # BLD AUTO: 0 K/UL
NRBC BLD-RTO: 0 /100 WBC (ref 0–0.2)
PLATELET # BLD AUTO: 242 K/UL (ref 164–446)
PMV BLD AUTO: 11.1 FL (ref 9–12.9)
POTASSIUM SERPL-SCNC: 3.9 MMOL/L (ref 3.6–5.5)
PROT SERPL-MCNC: 6.6 G/DL (ref 6–8.2)
RBC # BLD AUTO: 4.65 M/UL (ref 4.2–5.4)
SODIUM SERPL-SCNC: 143 MMOL/L (ref 135–145)
VIT B12 SERPL-MCNC: 1465 PG/ML (ref 211–911)
WBC # BLD AUTO: 5.4 K/UL (ref 4.8–10.8)

## 2023-07-07 PROCEDURE — 80053 COMPREHEN METABOLIC PANEL: CPT

## 2023-07-07 PROCEDURE — 84155 ASSAY OF PROTEIN SERUM: CPT

## 2023-07-07 PROCEDURE — 82784 ASSAY IGA/IGD/IGG/IGM EACH: CPT

## 2023-07-07 PROCEDURE — 84165 PROTEIN E-PHORESIS SERUM: CPT

## 2023-07-07 PROCEDURE — 85025 COMPLETE CBC W/AUTO DIFF WBC: CPT

## 2023-07-07 PROCEDURE — 36415 COLL VENOUS BLD VENIPUNCTURE: CPT

## 2023-07-07 PROCEDURE — 82607 VITAMIN B-12: CPT

## 2023-07-07 PROCEDURE — 86334 IMMUNOFIX E-PHORESIS SERUM: CPT

## 2023-07-07 PROCEDURE — 83521 IG LIGHT CHAINS FREE EACH: CPT

## 2023-07-08 LAB
KAPPA LC FREE SER-MCNC: 21.61 MG/L (ref 3.3–19.4)
KAPPA LC FREE/LAMBDA FREE SER NEPH: 0.82 {RATIO} (ref 0.26–1.65)
LAMBDA LC FREE SERPL-MCNC: 26.2 MG/L (ref 5.71–26.3)

## 2023-07-10 LAB
ALBUMIN SERPL ELPH-MCNC: 3.65 G/DL (ref 3.75–5.01)
ALPHA1 GLOB SERPL ELPH-MCNC: 0.25 G/DL (ref 0.19–0.46)
ALPHA2 GLOB SERPL ELPH-MCNC: 0.73 G/DL (ref 0.48–1.05)
B-GLOBULIN SERPL ELPH-MCNC: 0.58 G/DL (ref 0.48–1.1)
GAMMA GLOB SERPL ELPH-MCNC: 0.99 G/DL (ref 0.62–1.51)
IGA SERPL-MCNC: 72 MG/DL (ref 68–408)
IGG SERPL-MCNC: 1106 MG/DL (ref 768–1632)
IGM SERPL-MCNC: 45 MG/DL (ref 35–263)
INTERPRETATION SERPL IFE-IMP: ABNORMAL
MONOCLON BAND OBS SERPL: ABNORMAL
MONOCLONAL PROTEIN NL11656: 0.66 G/DL
PATHOLOGY STUDY: ABNORMAL
PROT SERPL-MCNC: 6.2 G/DL (ref 6.3–8.2)

## 2023-08-29 ENCOUNTER — OFFICE VISIT (OUTPATIENT)
Dept: MEDICAL GROUP | Facility: PHYSICIAN GROUP | Age: 88
End: 2023-08-29
Payer: MEDICARE

## 2023-08-29 VITALS
RESPIRATION RATE: 16 BRPM | BODY MASS INDEX: 31.53 KG/M2 | DIASTOLIC BLOOD PRESSURE: 62 MMHG | OXYGEN SATURATION: 97 % | HEIGHT: 59 IN | HEART RATE: 66 BPM | SYSTOLIC BLOOD PRESSURE: 132 MMHG | TEMPERATURE: 97.8 F | WEIGHT: 156.4 LBS

## 2023-08-29 DIAGNOSIS — R77.8 ABNORMAL SPEP: ICD-10-CM

## 2023-08-29 DIAGNOSIS — I10 ESSENTIAL HYPERTENSION: ICD-10-CM

## 2023-08-29 DIAGNOSIS — R20.2 PARESTHESIAS: ICD-10-CM

## 2023-08-29 DIAGNOSIS — R79.81 ELEVATED CO2 LEVEL: ICD-10-CM

## 2023-08-29 DIAGNOSIS — R76.8 ELEVATED SERUM IMMUNOGLOBULIN FREE LIGHT CHAIN LEVEL: ICD-10-CM

## 2023-08-29 DIAGNOSIS — E83.42 HYPOMAGNESEMIA: ICD-10-CM

## 2023-08-29 PROCEDURE — 99214 OFFICE O/P EST MOD 30 MIN: CPT | Performed by: STUDENT IN AN ORGANIZED HEALTH CARE EDUCATION/TRAINING PROGRAM

## 2023-08-29 PROCEDURE — 3078F DIAST BP <80 MM HG: CPT | Performed by: STUDENT IN AN ORGANIZED HEALTH CARE EDUCATION/TRAINING PROGRAM

## 2023-08-29 PROCEDURE — 1126F AMNT PAIN NOTED NONE PRSNT: CPT | Performed by: STUDENT IN AN ORGANIZED HEALTH CARE EDUCATION/TRAINING PROGRAM

## 2023-08-29 PROCEDURE — 3075F SYST BP GE 130 - 139MM HG: CPT | Performed by: STUDENT IN AN ORGANIZED HEALTH CARE EDUCATION/TRAINING PROGRAM

## 2023-08-29 ASSESSMENT — PAIN SCALES - GENERAL: PAINLEVEL: NO PAIN

## 2023-08-29 ASSESSMENT — FIBROSIS 4 INDEX: FIB4 SCORE: 1.44

## 2023-08-29 NOTE — PATIENT INSTRUCTIONS
non-fasting labs due anytime to repeat magnesium    Ideal blood pressure <130/80 and at least <140/90.  If <100 for the top number we are over treating.    I will be transferring to the clinic below 10/10/2023.  [facility]  [address] [place] [address], [place] [address]     I will be happy to continue to be your primary care provider if you'd like to follow me. If not please call [phone] to establish care with new provider

## 2023-08-29 NOTE — PROGRESS NOTES
"Subjective:     Chief Complaint   Patient presents with    Lab Results     Fallow up lab results       HPI:   Corrinne presents today for follow-up and review of lab results.    Patient states that she recently saw her oncologist and had labs done in early August, brings in a copy of the report (see media).  Patient denies any diarrhea or dietary restrictions.  Reports occasional constipation but nothing persistent.  Denies any palpitations, chest pain or shortness of breath.  Has some occasional lower extremity edema but not currently.    Feeling well currently without complaints.  Typically takes her blood pressure medication in the morning with her thyroid medication, has been doing this for quite some time.  Takes her cholesterol medication at night.    Patient reports that she was taking excess calcium and vitamin D and was told by her oncologist to reduce.  We reviewed her most recent labs from July and also updated labs from August.  Fortunately has not had any further episodes of the prior paresthesias she had been experiencing prior.    Objective:     Exam:  /62 (BP Location: Right arm, Patient Position: Sitting, BP Cuff Size: Adult)   Pulse 66   Temp 36.6 °C (97.8 °F) (Temporal)   Resp 16   Ht 1.499 m (4' 11\")   Wt 70.9 kg (156 lb 6.4 oz)   SpO2 97%   BMI 31.59 kg/m²  Body mass index is 31.59 kg/m².    Physical Exam  Vitals reviewed.   Constitutional:       General: She is not in acute distress.     Appearance: Normal appearance. She is not ill-appearing.   HENT:      Head: Normocephalic and atraumatic.      Nose: Nose normal.      Mouth/Throat:      Mouth: Mucous membranes are moist.   Eyes:      Conjunctiva/sclera: Conjunctivae normal.   Cardiovascular:      Rate and Rhythm: Normal rate and regular rhythm.      Heart sounds: Murmur heard.      Systolic murmur is present with a grade of 2/6.   Pulmonary:      Effort: Pulmonary effort is normal. No respiratory distress.      Breath sounds: " Normal breath sounds. No stridor. No wheezing, rhonchi or rales.   Musculoskeletal:      Cervical back: Normal range of motion and neck supple. No rigidity or tenderness.      Right lower leg: No edema.      Left lower leg: No edema.      Comments: Varicose veins noted bilaterally   Skin:     General: Skin is warm and dry.   Neurological:      General: No focal deficit present.      Mental Status: She is alert and oriented to person, place, and time.      Gait: Gait normal.   Psychiatric:         Mood and Affect: Mood normal.         Behavior: Behavior normal.         Thought Content: Thought content normal.       Labs: Reviewed from 7/7/2023    Assessment & Plan:     87 y.o. female with the following -     1. Essential hypertension  This is a chronic condition, reports well-controlled at home and controlled on repeat.  Continue medication as below pending repeat labs.  Discussed ideal ranges and return precautions.    chlorthalidone (HYGROTON) 25 MG Tab, TAKE 1 TABLET BY MOUTH DAILY, Disp: 90 Tablet, Rfl: 3    2. Hypomagnesemia  Mild, 1.7.  We will have her trend her labs as this may be a medication side effect.  Plan pending results.  - Comp Metabolic Panel; Future  - MAGNESIUM; Future    3. Elevated CO2 level  Noted on lab work from August, 34.  Trend.  - Comp Metabolic Panel; Future    4. Paresthesias  5. Abnormal SPEP  6. Elevated serum immunoglobulin free light chain level  Patient with intermittent chronic paresthesias but not since last visit with abnormal SPEP and elevated kappa light chains.  We will have patient followed by hematology/oncology.  Aware of return precautions for recurrence of symptoms.  - Referral to Hematology Oncology    Return in about 2 months (around 10/29/2023), or if symptoms worsen or fail to improve, for establish care.    Please note that this dictation was created using voice recognition software. I have made every reasonable attempt to correct obvious errors, but I expect that  there are errors of grammar and possibly content that I did not discover before finalizing the note.

## 2023-09-12 ENCOUNTER — HOSPITAL ENCOUNTER (OUTPATIENT)
Dept: HEMATOLOGY ONCOLOGY | Facility: MEDICAL CENTER | Age: 88
End: 2023-09-12
Attending: NURSE PRACTITIONER
Payer: MEDICARE

## 2023-09-12 VITALS
SYSTOLIC BLOOD PRESSURE: 122 MMHG | DIASTOLIC BLOOD PRESSURE: 58 MMHG | TEMPERATURE: 97.7 F | HEART RATE: 66 BPM | OXYGEN SATURATION: 96 % | HEIGHT: 59 IN | RESPIRATION RATE: 14 BRPM | WEIGHT: 156.97 LBS | BODY MASS INDEX: 31.64 KG/M2

## 2023-09-12 DIAGNOSIS — D47.2 IGG LAMBDA MONOCLONAL GAMMOPATHY: ICD-10-CM

## 2023-09-12 PROCEDURE — 99212 OFFICE O/P EST SF 10 MIN: CPT | Performed by: NURSE PRACTITIONER

## 2023-09-12 PROCEDURE — 99204 OFFICE O/P NEW MOD 45 MIN: CPT | Performed by: NURSE PRACTITIONER

## 2023-09-12 ASSESSMENT — ENCOUNTER SYMPTOMS
WEIGHT LOSS: 0
VOMITING: 0
HEADACHES: 0
FEVER: 0
TINGLING: 0
DIZZINESS: 0
COUGH: 0
CONSTIPATION: 0
SHORTNESS OF BREATH: 0
DIAPHORESIS: 0
MYALGIAS: 0
DIARRHEA: 0
NAUSEA: 0
PALPITATIONS: 0
CHILLS: 0

## 2023-09-12 ASSESSMENT — FIBROSIS 4 INDEX: FIB4 SCORE: 1.44

## 2023-09-12 ASSESSMENT — PAIN SCALES - GENERAL: PAINLEVEL: NO PAIN

## 2023-09-12 NOTE — PROGRESS NOTES
"Subjective     Corrinne Alice Carmignani is a 87 y.o. female who presents with Breast Cancer (Elevated serum immunoglobulin free light chain level)          HPI    Patient referred to me, Intake Oncology Coordinator by her PCP Dr. Perez for MGUS.  Patient is unaccompanied for today's visit.    Patient originally complained of a \"stinging feeling\" in her body back in 2000.  It did not return until recently where she has been having this feeling intermittently.  She was seen by her PCP who ordered an SPEP for further evaluation with regards to the symptoms.  Initial SPEP completed in 3/10/2023 showed a monoclonal spike in the gamma region with the immunofixation gel pattern showing an IgG type lambda monoclonal protein.  Immunoglobulins G, A, M and were all within normal limits.  Patient had no evidence of anemia, hypercalcemia or kidney dysfunction at that time.  Repeat labs on 7/7/2023 were completed which continue to show a monoclonal spike in the gamma region with the immunofixation gel pattern showing an IgG type lambda monoclonal protein, immunoglobulins G, A, M were all within normal limits, and continued no evidence of anemia, hypercalcemia or kidney dysfunction.  However this time she did have light chain evaluation as well which did show an elevated kappa light chain, but the kappa/lambda ratio was within normal limits.  I did personally review the labs in detail with the patient today.    Patient denies any clinical symptoms with the exception of that tingling feeling that she has continued to have intermittently.  She is also been referred to neurology who is scheduled to be seen in October 2023.  Patient is very active and volunteers within the community, completing approximately 800 hours of volunteering per year.    Please see past medical and surgical history below.  Of note, patient does have a history of breast cancer in which she was treated with surgeon, Dr. Parry, radiation oncologist, Dr. Womack, " and medical oncologist, Dr. Márquez.  Patient was uncertain as to the exact treatment following her surgery and radiation but she does remember taking a pill for quite some time.  She then stated that she took a pill but could not tolerate it and is now getting an injection.  Based on her description it appears that she was attempted to try Fosamax with no tolerance and subsequently has been receiving Prolia injections every 6 months with Dr. Márquez.  She is due to follow-up with Dr. Márquez in February 2024.    Patient denies a family history of cancer.    Patient is a very reformed smoker in which she quit in 1998.  She smoked for very short time, approximately half pack per day.    Allergies   Allergen Reactions    Fosamax [Alendronate Sodium]      Acid reflux      Current Outpatient Medications on File Prior to Encounter   Medication Sig Dispense Refill    levothyroxine (SYNTHROID) 25 MCG Tab TAKE 1 TABLET BY MOUTH IN  THE MORNING ON AN EMPTY  STOMACH 90 Tablet 3    diclofenac sodium (VOLTAREN) 1 % Gel Apply 4 g topically 4 times a day as needed (knee pain (max 32g/day)). 100 g 3    chlorthalidone (HYGROTON) 25 MG Tab TAKE 1 TABLET BY MOUTH DAILY 90 Tablet 3    NON SPECIFIED 15-20 mmHg compression stockings (prefer open toe or calf sleeve type), 4 pairs. Duration of use: 99 (lifelong) 4 Each 0    simvastatin (ZOCOR) 20 MG Tab Take 1 Tablet by mouth every evening. 90 Tablet 3    gabapentin (NEURONTIN) 100 MG Cap       vitamin E 180 mg (400 units) Cap Take 180 mg by mouth every day.      aspirin (ASA) 81 MG Chew Tab chewable tablet Take 81 mg by mouth every day.      Multiple Vitamin (MULTI-VITAMIN DAILY PO) Take  by mouth.       No current facility-administered medications on file prior to encounter.     Past Medical History:   Diagnosis Date    Arthritis     shoulder    Back pain     Cancer (HCC)     Cataract     genna IOLI    DDD (degenerative disc disease), lumbar 3/9/2018    Dental disorder     lower partial     Healthcare maintenance 3/9/2018    Hyperlipidemia     Hypothyroid     Lumbar spondylosis     with intermittent radiculopathy    Peripheral edema 3/9/2018    Prediabetes 2020    Snoring     sleep study done, negative, years ago     Past Surgical History:   Procedure Laterality Date    MASTECTOMY Right 2016    Procedure: MASTECTOMY partial  ;  Surgeon: Rosy Parry M.D.;  Location: SURGERY SAME DAY [facility]S;  Service:     NODE BIOPSY SENTINEL Right 2016    Procedure: NODE BIOPSY SENTINEL Lymph  ;  Surgeon: Rosy Parry M.D.;  Location: SURGERY SAME DAY [facility] ORS;  Service:     OOPHORECTOMY  1977    cyst    ABDOMINAL HYSTERECTOMY TOTAL  1972    non-cancer    TONSILLECTOMY  194    OTHER  ,     cataract IOLI genna    US-NEEDLE CORE BX-BREAST PANEL       Family History   Problem Relation Age of Onset    Heart Disease Mother     Other Father     Drug abuse Son     Cancer Neg Hx      Social History     Socioeconomic History    Marital status:     Number of children: 2   Tobacco Use    Smoking status: Former     Current packs/day: 0.00     Average packs/day: 0.5 packs/day for 45.0 years (22.5 ttl pk-yrs)     Types: Cigarettes     Start date: 1953     Quit date: 1998     Years since quittin.7    Smokeless tobacco: Never    Tobacco comments:     avoid all tobacco products   Vaping Use    Vaping Use: Never used   Substance and Sexual Activity    Alcohol use: Yes     Alcohol/week: 0.0 oz     Comment: 7/year    Drug use: No    Sexual activity: Yes     Partners: Male     Comment:  , retired worked for a bank as .           Review of Systems   Constitutional:  Negative for chills, diaphoresis, fever, malaise/fatigue and weight loss.   Respiratory:  Negative for cough and shortness of breath.    Cardiovascular:  Negative for chest pain and palpitations.   Gastrointestinal:  Negative for constipation, diarrhea, nausea and vomiting.  "  Genitourinary:  Negative for dysuria.   Musculoskeletal:  Negative for myalgias.   Neurological:  Negative for dizziness, tingling and headaches.              Objective     /58   Pulse 66   Temp 36.5 °C (97.7 °F) (Temporal)   Resp 14   Ht 1.499 m (4' 11\")   Wt 71.2 kg (156 lb 15.5 oz)   SpO2 96%   BMI 31.70 kg/m²      Physical Exam  Vitals reviewed.   Constitutional:       General: She is not in acute distress.     Appearance: Normal appearance. She is not diaphoretic.   HENT:      Head: Normocephalic and atraumatic.   Cardiovascular:      Rate and Rhythm: Normal rate and regular rhythm.      Heart sounds: Normal heart sounds. No murmur heard.     No friction rub. No gallop.   Pulmonary:      Effort: Pulmonary effort is normal. No respiratory distress.      Breath sounds: Normal breath sounds. No wheezing.   Abdominal:      General: Bowel sounds are normal. There is no distension.      Palpations: Abdomen is soft.      Tenderness: There is no abdominal tenderness.   Musculoskeletal:         General: No swelling or tenderness. Normal range of motion.   Skin:     General: Skin is warm and dry.   Neurological:      Mental Status: She is alert and oriented to person, place, and time.   Psychiatric:         Mood and Affect: Mood normal.         Behavior: Behavior normal.        Latest Reference Range & Units 07/07/23 08:26   WBC 4.8 - 10.8 K/uL 5.4   RBC 4.20 - 5.40 M/uL 4.65   Hemoglobin 12.0 - 16.0 g/dL 14.4   Hematocrit 37.0 - 47.0 % 44.3   MCV 81.4 - 97.8 fL 95.3   MCH 27.0 - 33.0 pg 31.0   MCHC 32.2 - 35.5 g/dL 32.5   RDW 35.9 - 50.0 fL 46.1   Platelet Count 164 - 446 K/uL 242   MPV 9.0 - 12.9 fL 11.1   Neutrophils-Polys 44.00 - 72.00 % 58.90   Neutrophils (Absolute) 1.82 - 7.42 K/uL 3.20   Lymphocytes 22.00 - 41.00 % 22.60   Lymphs (Absolute) 1.00 - 4.80 K/uL 1.23   Monocytes 0.00 - 13.40 % 13.20   Monos (Absolute) 0.00 - 0.85 K/uL 0.72   Eosinophils 0.00 - 6.90 % 4.00   Eos (Absolute) 0.00 - 0.51 " K/uL 0.22   Basophils 0.00 - 1.80 % 1.10   Baso (Absolute) 0.00 - 0.12 K/uL 0.06   Immature Granulocytes 0.00 - 0.90 % 0.20   Immature Granulocytes (abs) 0.00 - 0.11 K/uL 0.01   Nucleated RBC 0.00 - 0.20 /100 WBC 0.00   NRBC (Absolute) K/uL 0.00   Sodium 135 - 145 mmol/L 143   Potassium 3.6 - 5.5 mmol/L 3.9   Chloride 96 - 112 mmol/L 102   Co2 20 - 33 mmol/L 31   Anion Gap 7.0 - 16.0  10.0   Glucose 65 - 99 mg/dL 87   Bun 8 - 22 mg/dL 25 (H)   Creatinine 0.50 - 1.40 mg/dL 0.68   GFR (CKD-EPI) >60 mL/min/1.73 m 2 84   Calcium 8.5 - 10.5 mg/dL 10.4   Correct Calcium 8.5 - 10.5 mg/dL 10.6 (H)   AST(SGOT) 12 - 45 U/L 17   ALT(SGPT) 2 - 50 U/L 18   Alkaline Phosphatase 30 - 99 U/L 60   Total Bilirubin 0.1 - 1.5 mg/dL 0.8   Albumin 3.2 - 4.9 g/dL 3.7   Total Protein 6.0 - 8.2 g/dL 6.6   Globulin 1.9 - 3.5 g/dL 2.9   A-G Ratio g/dL 1.3   Immunoglobulin A 68 - 408 mg/dL 72   Immunoglobulin G 768 - 1632 mg/dL 1106   Immunoglobulin M 35 - 263 mg/dL 45   Vitamin B12 -True Cobalamin 211 - 911 pg/mL 1465 (H)   GADIEL Reflex  GADIEL Done   Interpretation  See Note   Albumin 3.75 - 5.01 g/dL 3.65 (L)   Gamma Globulin 0.62 - 1.51 g/dL 0.99   Alpha-1 Globulin 0.19 - 0.46 g/dL 0.25   Alpha-2 Globulin 0.48 - 1.05 g/dL 0.73   Beta Globulin 0.48 - 1.10 g/dL 0.58   Free Kappa Light Chains 3.30 - 19.40 mg/L 21.61 (H)   Free Lambda Light Chains 5.71 - 26.30 mg/L 26.20   Kappa-Lambda Ratio 0.26 - 1.65  0.82   EER Serum Prot. Electro. Reflex  See Note   Monoclonal Protein g/dL 0.66   Total Protein, Serum 6.3 - 8.2 g/dL 6.2 (L)                     Assessment & Plan       1. IgG lambda monoclonal gammopathy                Discussed with patient today that she does have a diagnosis of MGUS and this would require continued follow-up by hematologist.  She is already established with her medical oncologist for her breast cancer, Dr. Márquez who is able to continue to monitor the MGUS as well.  Therefore I will defer to Dr. Márquez for continued monitoring  and management of her MGUS.  I did inform the patient that I would recommend repeat labs again in 6 months.  Patient stated that she will contact Dr. Márquez's office to acquire a lab slip so she can have this completed for her follow-up visit that is already scheduled for February 2024.  I will also update Dr. áMrquez as well.    Patient did voice understanding's and agreed with the plan.      Please note that this dictation was created using voice recognition software. I have made every reasonable attempt to correct obvious errors, but I expect that there are errors of grammar and possibly content that I did not discover before finalizing the note.

## 2023-09-14 ENCOUNTER — TELEPHONE (OUTPATIENT)
Dept: HEMATOLOGY ONCOLOGY | Facility: MEDICAL CENTER | Age: 88
End: 2023-09-14
Payer: MEDICARE

## 2023-09-14 NOTE — TELEPHONE ENCOUNTER
Phone Number Called: [phone] (home)     Call outcome: Spoke to patient regarding message below.    Message: Called patient to let her know the Adela has spoken with Dr. Márquez and to contact them for labs. She understood and thanked me for the call. Said she was glad because she didn't want to do it twice.

## 2023-09-19 ENCOUNTER — HOSPITAL ENCOUNTER (OUTPATIENT)
Dept: LAB | Facility: MEDICAL CENTER | Age: 88
End: 2023-09-19
Attending: STUDENT IN AN ORGANIZED HEALTH CARE EDUCATION/TRAINING PROGRAM
Payer: MEDICARE

## 2023-09-19 DIAGNOSIS — R79.81 ELEVATED CO2 LEVEL: ICD-10-CM

## 2023-09-19 DIAGNOSIS — E83.42 HYPOMAGNESEMIA: ICD-10-CM

## 2023-09-19 LAB
ALBUMIN SERPL BCP-MCNC: 3.9 G/DL (ref 3.2–4.9)
ALBUMIN/GLOB SERPL: 1.4 G/DL
ALP SERPL-CCNC: 59 U/L (ref 30–99)
ALT SERPL-CCNC: 15 U/L (ref 2–50)
ANION GAP SERPL CALC-SCNC: 10 MMOL/L (ref 7–16)
AST SERPL-CCNC: 17 U/L (ref 12–45)
BILIRUB SERPL-MCNC: 0.8 MG/DL (ref 0.1–1.5)
BUN SERPL-MCNC: 19 MG/DL (ref 8–22)
CALCIUM ALBUM COR SERPL-MCNC: 9.3 MG/DL (ref 8.5–10.5)
CALCIUM SERPL-MCNC: 9.2 MG/DL (ref 8.5–10.5)
CHLORIDE SERPL-SCNC: 99 MMOL/L (ref 96–112)
CO2 SERPL-SCNC: 30 MMOL/L (ref 20–33)
CREAT SERPL-MCNC: 0.68 MG/DL (ref 0.5–1.4)
GFR SERPLBLD CREATININE-BSD FMLA CKD-EPI: 84 ML/MIN/1.73 M 2
GLOBULIN SER CALC-MCNC: 2.8 G/DL (ref 1.9–3.5)
GLUCOSE SERPL-MCNC: 94 MG/DL (ref 65–99)
MAGNESIUM SERPL-MCNC: 1.9 MG/DL (ref 1.5–2.5)
POTASSIUM SERPL-SCNC: 3.8 MMOL/L (ref 3.6–5.5)
PROT SERPL-MCNC: 6.7 G/DL (ref 6–8.2)
SODIUM SERPL-SCNC: 139 MMOL/L (ref 135–145)

## 2023-09-19 PROCEDURE — 36415 COLL VENOUS BLD VENIPUNCTURE: CPT

## 2023-09-19 PROCEDURE — 83735 ASSAY OF MAGNESIUM: CPT

## 2023-09-19 PROCEDURE — 80053 COMPREHEN METABOLIC PANEL: CPT

## 2023-09-29 ENCOUNTER — HOSPITAL ENCOUNTER (OUTPATIENT)
Dept: RADIOLOGY | Facility: MEDICAL CENTER | Age: 88
End: 2023-09-29
Attending: INTERNAL MEDICINE
Payer: MEDICARE

## 2023-09-29 DIAGNOSIS — Z12.31 BREAST CANCER SCREENING BY MAMMOGRAM: ICD-10-CM

## 2023-09-29 PROCEDURE — 77063 BREAST TOMOSYNTHESIS BI: CPT

## 2023-10-09 SDOH — ECONOMIC STABILITY: INCOME INSECURITY: HOW HARD IS IT FOR YOU TO PAY FOR THE VERY BASICS LIKE FOOD, HOUSING, MEDICAL CARE, AND HEATING?: NOT HARD AT ALL

## 2023-10-09 SDOH — HEALTH STABILITY: MENTAL HEALTH
STRESS IS WHEN SOMEONE FEELS TENSE, NERVOUS, ANXIOUS, OR CAN'T SLEEP AT NIGHT BECAUSE THEIR MIND IS TROUBLED. HOW STRESSED ARE YOU?: ONLY A LITTLE

## 2023-10-09 SDOH — ECONOMIC STABILITY: HOUSING INSECURITY
IN THE LAST 12 MONTHS, WAS THERE A TIME WHEN YOU DID NOT HAVE A STEADY PLACE TO SLEEP OR SLEPT IN A SHELTER (INCLUDING NOW)?: NO

## 2023-10-09 SDOH — ECONOMIC STABILITY: FOOD INSECURITY: WITHIN THE PAST 12 MONTHS, YOU WORRIED THAT YOUR FOOD WOULD RUN OUT BEFORE YOU GOT MONEY TO BUY MORE.: NEVER TRUE

## 2023-10-09 SDOH — ECONOMIC STABILITY: FOOD INSECURITY: WITHIN THE PAST 12 MONTHS, THE FOOD YOU BOUGHT JUST DIDN'T LAST AND YOU DIDN'T HAVE MONEY TO GET MORE.: NEVER TRUE

## 2023-10-09 SDOH — ECONOMIC STABILITY: HOUSING INSECURITY: IN THE LAST 12 MONTHS, HOW MANY PLACES HAVE YOU LIVED?: 1

## 2023-10-09 SDOH — ECONOMIC STABILITY: INCOME INSECURITY: IN THE LAST 12 MONTHS, WAS THERE A TIME WHEN YOU WERE NOT ABLE TO PAY THE MORTGAGE OR RENT ON TIME?: NO

## 2023-10-09 SDOH — ECONOMIC STABILITY: TRANSPORTATION INSECURITY
IN THE PAST 12 MONTHS, HAS LACK OF TRANSPORTATION KEPT YOU FROM MEETINGS, WORK, OR FROM GETTING THINGS NEEDED FOR DAILY LIVING?: NO

## 2023-10-09 SDOH — ECONOMIC STABILITY: TRANSPORTATION INSECURITY
IN THE PAST 12 MONTHS, HAS THE LACK OF TRANSPORTATION KEPT YOU FROM MEDICAL APPOINTMENTS OR FROM GETTING MEDICATIONS?: NO

## 2023-10-09 SDOH — ECONOMIC STABILITY: TRANSPORTATION INSECURITY
IN THE PAST 12 MONTHS, HAS LACK OF RELIABLE TRANSPORTATION KEPT YOU FROM MEDICAL APPOINTMENTS, MEETINGS, WORK OR FROM GETTING THINGS NEEDED FOR DAILY LIVING?: NO

## 2023-10-09 SDOH — HEALTH STABILITY: PHYSICAL HEALTH: ON AVERAGE, HOW MANY MINUTES DO YOU ENGAGE IN EXERCISE AT THIS LEVEL?: PATIENT DECLINED

## 2023-10-09 SDOH — HEALTH STABILITY: PHYSICAL HEALTH
ON AVERAGE, HOW MANY DAYS PER WEEK DO YOU ENGAGE IN MODERATE TO STRENUOUS EXERCISE (LIKE A BRISK WALK)?: PATIENT DECLINED

## 2023-10-09 ASSESSMENT — SOCIAL DETERMINANTS OF HEALTH (SDOH)
HOW OFTEN DO YOU GET TOGETHER WITH FRIENDS OR RELATIVES?: MORE THAN THREE TIMES A WEEK
HOW HARD IS IT FOR YOU TO PAY FOR THE VERY BASICS LIKE FOOD, HOUSING, MEDICAL CARE, AND HEATING?: NOT HARD AT ALL
HOW OFTEN DO YOU HAVE SIX OR MORE DRINKS ON ONE OCCASION: NEVER
IN A TYPICAL WEEK, HOW MANY TIMES DO YOU TALK ON THE PHONE WITH FAMILY, FRIENDS, OR NEIGHBORS?: MORE THAN THREE TIMES A WEEK
HOW OFTEN DO YOU ATTEND CHURCH OR RELIGIOUS SERVICES?: PATIENT DECLINED
HOW OFTEN DO YOU ATTENT MEETINGS OF THE CLUB OR ORGANIZATION YOU BELONG TO?: MORE THAN 4 TIMES PER YEAR
WITHIN THE PAST 12 MONTHS, YOU WORRIED THAT YOUR FOOD WOULD RUN OUT BEFORE YOU GOT THE MONEY TO BUY MORE: NEVER TRUE
HOW OFTEN DO YOU HAVE A DRINK CONTAINING ALCOHOL: MONTHLY OR LESS
HOW OFTEN DO YOU ATTENT MEETINGS OF THE CLUB OR ORGANIZATION YOU BELONG TO?: MORE THAN 4 TIMES PER YEAR
HOW OFTEN DO YOU GET TOGETHER WITH FRIENDS OR RELATIVES?: MORE THAN THREE TIMES A WEEK
HOW OFTEN DO YOU ATTEND CHURCH OR RELIGIOUS SERVICES?: PATIENT DECLINED
DO YOU BELONG TO ANY CLUBS OR ORGANIZATIONS SUCH AS CHURCH GROUPS UNIONS, FRATERNAL OR ATHLETIC GROUPS, OR SCHOOL GROUPS?: YES
DO YOU BELONG TO ANY CLUBS OR ORGANIZATIONS SUCH AS CHURCH GROUPS UNIONS, FRATERNAL OR ATHLETIC GROUPS, OR SCHOOL GROUPS?: YES
HOW MANY DRINKS CONTAINING ALCOHOL DO YOU HAVE ON A TYPICAL DAY WHEN YOU ARE DRINKING: 1 OR 2
IN A TYPICAL WEEK, HOW MANY TIMES DO YOU TALK ON THE PHONE WITH FAMILY, FRIENDS, OR NEIGHBORS?: MORE THAN THREE TIMES A WEEK

## 2023-10-09 ASSESSMENT — LIFESTYLE VARIABLES
HOW OFTEN DO YOU HAVE SIX OR MORE DRINKS ON ONE OCCASION: NEVER
AUDIT-C TOTAL SCORE: 1
HOW OFTEN DO YOU HAVE A DRINK CONTAINING ALCOHOL: MONTHLY OR LESS
HOW MANY STANDARD DRINKS CONTAINING ALCOHOL DO YOU HAVE ON A TYPICAL DAY: 1 OR 2
SKIP TO QUESTIONS 9-10: 1

## 2023-10-12 ENCOUNTER — OFFICE VISIT (OUTPATIENT)
Dept: MEDICAL GROUP | Facility: PHYSICIAN GROUP | Age: 88
End: 2023-10-12
Payer: MEDICARE

## 2023-10-12 VITALS
WEIGHT: 151 LBS | HEART RATE: 83 BPM | BODY MASS INDEX: 29.64 KG/M2 | HEIGHT: 60 IN | OXYGEN SATURATION: 96 % | RESPIRATION RATE: 14 BRPM | DIASTOLIC BLOOD PRESSURE: 62 MMHG | TEMPERATURE: 97.3 F | SYSTOLIC BLOOD PRESSURE: 118 MMHG

## 2023-10-12 DIAGNOSIS — Z23 NEED FOR VACCINATION: ICD-10-CM

## 2023-10-12 DIAGNOSIS — D47.2 IGG LAMBDA MONOCLONAL GAMMOPATHY: ICD-10-CM

## 2023-10-12 DIAGNOSIS — M85.89 OSTEOPENIA OF MULTIPLE SITES: ICD-10-CM

## 2023-10-12 DIAGNOSIS — R20.2 PARESTHESIAS: ICD-10-CM

## 2023-10-12 DIAGNOSIS — E78.2 MIXED HYPERLIPIDEMIA: ICD-10-CM

## 2023-10-12 DIAGNOSIS — I35.0 AORTIC STENOSIS, MILD: ICD-10-CM

## 2023-10-12 DIAGNOSIS — Z85.3 HISTORY OF BREAST CANCER: ICD-10-CM

## 2023-10-12 DIAGNOSIS — I35.8 AORTIC VALVE SCLEROSIS: ICD-10-CM

## 2023-10-12 DIAGNOSIS — E03.9 HYPOTHYROIDISM, UNSPECIFIED TYPE: ICD-10-CM

## 2023-10-12 DIAGNOSIS — I10 ESSENTIAL HYPERTENSION: ICD-10-CM

## 2023-10-12 DIAGNOSIS — I87.2 EDEMA OF BOTH LOWER EXTREMITIES DUE TO PERIPHERAL VENOUS INSUFFICIENCY: ICD-10-CM

## 2023-10-12 PROBLEM — M25.561 ACUTE PAIN OF RIGHT KNEE: Status: RESOLVED | Noted: 2023-06-16 | Resolved: 2023-10-12

## 2023-10-12 PROBLEM — R79.81 ELEVATED CO2 LEVEL: Status: RESOLVED | Noted: 2023-08-29 | Resolved: 2023-10-12

## 2023-10-12 PROCEDURE — G0008 ADMIN INFLUENZA VIRUS VAC: HCPCS | Performed by: FAMILY MEDICINE

## 2023-10-12 PROCEDURE — 3074F SYST BP LT 130 MM HG: CPT | Performed by: FAMILY MEDICINE

## 2023-10-12 PROCEDURE — 90662 IIV NO PRSV INCREASED AG IM: CPT | Performed by: FAMILY MEDICINE

## 2023-10-12 PROCEDURE — 99214 OFFICE O/P EST MOD 30 MIN: CPT | Mod: 25 | Performed by: FAMILY MEDICINE

## 2023-10-12 PROCEDURE — 3078F DIAST BP <80 MM HG: CPT | Performed by: FAMILY MEDICINE

## 2023-10-12 ASSESSMENT — FIBROSIS 4 INDEX: FIB4 SCORE: 1.58

## 2023-10-12 NOTE — PROGRESS NOTES
"CHIEF COMPLAINT / REASON FOR VISIT  Corrinne Alice Carmignani is a 87 y.o. female that presents today to establish care.    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 Stays fairly active.     passed away 2022.     Lives independently. Takes care of all ADLs without assistance. Son and daughter in law live in [place].    Past Medical History  MGUS - following with oncology (Dr. Márquez) for management  Paresthesias - going to see neurology for first consultation. Takes gabapentin which helps.  Cerebral atrophy - MRI 23 \"Age-related volume loss and chronic microvascular ischemic changes.\"  Mild aortic stenosis on TTE 3/2/23 -   Breast cancer s/p right mastectomy - Follows with medical oncology (Dr. Márquez) every 6 months  Prolia injections every 6 months for osteopenia - managed by Dr. Márquez     Past Surgical History:   Procedure Laterality Date    MASTECTOMY Right 2016    Procedure: MASTECTOMY partial  ;  Surgeon: Rosy Parry M.D.;  Location: SURGERY SAME DAY [facility] ORS;  Service:     NODE BIOPSY SENTINEL Right 2016    Procedure: NODE BIOPSY SENTINEL Lymph  ;  Surgeon: Rosy Parry M.D.;  Location: SURGERY SAME DAY [facility] ORS;  Service:     OOPHORECTOMY  1977    cyst    ABDOMINAL HYSTERECTOMY TOTAL  1972    non-cancer    TONSILLECTOMY  194    OTHER  ,     cataract IOLI genna    US-NEEDLE CORE BX-BREAST PANEL       Social History     Tobacco Use    Smoking status: Former     Current packs/day: 0.00     Average packs/day: 0.5 packs/day for 45.0 years (22.5 ttl pk-yrs)     Types: Cigarettes     Start date: 1953     Quit date: 1998     Years since quittin.7    Smokeless tobacco: Never    Tobacco comments:     avoid all tobacco products   Vaping Use    Vaping Use: Never used   Substance Use Topics    Alcohol use: Yes     Alcohol/week: 0.0 oz     Comment: 7/year    Drug use: No     OBJECTIVE    /62 (BP Location: " "Right arm, Patient Position: Sitting, BP Cuff Size: Adult)   Pulse 83   Temp 36.3 °C (97.3 °F) (Temporal)   Resp 14   Ht 1.52 m (4' 11.84\")   Wt 68.5 kg (151 lb)   SpO2 96%   BMI 29.65 kg/m²      PHYSICAL EXAM  Constitutional: Sitting comfortably, in no acute distress, responds to questions appropriately.  Head: Normocephalic  Eyes:  No conjunctival injection, no scleral icterus, PERRL  Ears: External ear canals clear, TMs pearly grey with visualized bony landmarks and crisp light reflex  Mouth: Oral mucosa moist. Good dentition  Throat: Oropharynx clear without erythema or tonsillar exudates  Neck: No cervical lymphadenopathy  Heart: Regular S1 S2, no murmurs, rub, or gallops  Lungs: Clear to auscultation bilaterally, no wheezes, rales, or rhonchi  Extremities: 1+ pitting edema to distal shin lower extremities with numerous varicose veins  Skin: Warm and dry, no rashes or lesions on face or exposed upper extremities    ASSESSMENT & PLAN  1. Mixed hyperlipidemia  Chronic, controlled on simvastatin 20 mg daily, continue current therapy    2. Essential hypertension  Chronic, controlled on chlorthalidone 25 mg daily, continue current therapy.    3. IgG lambda monoclonal gammopathy  Recent diagnosis of MGUS, following with oncology (Dr. Márquez) for management.  Not currently on any medications for this.    4. Paresthesias  Seemingly random paresthesias in different dermatomes, has upcoming appointment with neurology for evaluation.  Takes gabapentin 100 mg as needed which is effective.    5. Aortic valve sclerosis  6. Aortic stenosis, mild  Mild aortic stenosis on TTE 3/2/23.  Asymptomatic.    7. 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  Could consider horse chestnut seed extract in future    8. Hypothyroidism, unspecified type  Chronic, controlled on levothyroxine 25 mcg daily, repeat TSH annually    9. Osteopenia of multiple " sites  Prolia injections every 6 months for osteopenia - managed by Dr. Márquez     10. Need for vaccination  - INFLUENZA VACCINE, HIGH DOSE (65+ ONLY)    11. History of breast cancer  In 2016 diagnosed with right-sided breast cancer s/p right mastectomy - Follows with medical oncology (Dr. Márquez) every 6 months    Follow-up in 6 months, need to order annual lab work prior

## 2023-10-20 ENCOUNTER — OFFICE VISIT (OUTPATIENT)
Dept: NEUROLOGY | Facility: MEDICAL CENTER | Age: 88
End: 2023-10-20
Attending: PSYCHIATRY & NEUROLOGY
Payer: MEDICARE

## 2023-10-20 VITALS
HEART RATE: 76 BPM | TEMPERATURE: 98.2 F | SYSTOLIC BLOOD PRESSURE: 112 MMHG | WEIGHT: 159.61 LBS | DIASTOLIC BLOOD PRESSURE: 74 MMHG | BODY MASS INDEX: 31.34 KG/M2 | OXYGEN SATURATION: 96 %

## 2023-10-20 DIAGNOSIS — R20.2 PARESTHESIAS: ICD-10-CM

## 2023-10-20 PROCEDURE — 99212 OFFICE O/P EST SF 10 MIN: CPT | Performed by: PSYCHIATRY & NEUROLOGY

## 2023-10-20 PROCEDURE — 99205 OFFICE O/P NEW HI 60 MIN: CPT | Performed by: PSYCHIATRY & NEUROLOGY

## 2023-10-20 PROCEDURE — 3074F SYST BP LT 130 MM HG: CPT | Performed by: PSYCHIATRY & NEUROLOGY

## 2023-10-20 PROCEDURE — 3078F DIAST BP <80 MM HG: CPT | Performed by: PSYCHIATRY & NEUROLOGY

## 2023-10-20 ASSESSMENT — ENCOUNTER SYMPTOMS
TREMORS: 0
HEADACHES: 0
FOCAL WEAKNESS: 0
MEMORY LOSS: 0
TINGLING: 1

## 2023-10-20 ASSESSMENT — FIBROSIS 4 INDEX: FIB4 SCORE: 1.58

## 2023-10-20 NOTE — PROGRESS NOTES
"Subjective Corrinne Alice Carmignani is a 87 y.o. female who presents from the office of Dr. Ariana Perez DO, for consultation, with a history of multifocal, persistent and recurrent paresthesias.     HPI Corrinne is a very pleasant 87-year-old right-handed woman whose symptoms started earlier this year, she estimates around March.  She describes them as being a sudden \"stinging\" feeling as if the needle was being pushed into her skin.  Quite brief, resolved completely, there is no actual residual pain, analgesia, etc.  The symptoms could recur, but unpredictably.  She can be days without them, then she can have multiple in a day.    She has not been able to identify triggers.  They can involve either side of the face, either upper extremity or lower extremity, and the location.  In the extremity will vary between the episodes she denies persistent rash, tenderness, and in the extremity or face, there is no residual weakness following an episode.    She remembers almost 20 some odd years ago in [place] that she had one event occur.  She does remember at that time she had a rather thorough work-up, she does not remember having EMG/NCV studies done, but she does remember imaging studies of the neck and a lot of blood work being drawn.  All of that was unremarkable.  With the recurrence recently, records indicate that she did have a lot of blood work drawn, including the typical screens from immunologic, paraproteinemia and paraneoplastic conditions, endocrine and vitamin deficiencies, all of these proving unremarkable.  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    Already on gabapentin for back pain chronically, she found that 100 mg tablet use can reduce the recurrence frequency when she has an episode.    She does describe a sensory distortion of \"vibration\" that is in both legs distally, it is not painful, there is no superficial paresthesia, dysesthesia or hyperalgesia in the " feet.  She does not know how long the symptoms have been there, they are not progressing.    She has a history of breast cancer, MGUS, hyperlipidemia, hypothyroidism, and hypertension.  There is no history of diabetes, autoimmune disease, liver or kidney disease, MS, seizure, migraine, or diagnosed neurodegenerative disease.    There is no surgical history of note from eye standpoint.    No one in the family has ever had symptoms such as this.  She does not know of either of her parents or half-brother have ever suffered from diagnosed neuropathy.    She will have a glass of wine with dinner on an occasion, has a distant history of tobacco use.    She is on gabapentin 100 mg as needed, also baby aspirin daily, vitamin E, Zocor 20 mg every evening, Synthroid 0.025 mg daily, chlorthalidone 25 mg daily and Voltaren gel.    Review of Systems   Neurological:  Positive for tingling. Negative for tremors, focal weakness and headaches.   Psychiatric/Behavioral:  Negative for memory loss.    All other systems reviewed and are negative.    Objective     /74 (BP Location: Right arm, Patient Position: Sitting, BP Cuff Size: Adult)   Pulse 76   Temp 36.8 °C (98.2 °F) (Temporal)   Wt 72.4 kg (159 lb 9.8 oz)   SpO2 96%   BMI 31.34 kg/m²      Physical Exam    She appears in no acute distress.  Her vital signs are stable.  There is no malar rash, jaw or temporal tenderness, jaw claudication, or allodynia.  Her neck is supple, range of motion is full.  Lhermitte's phenomena is absent, compression maneuvers are negative.  Carotid pulses are present without asymmetry.  Cardiac evaluation reveals a regular rhythm.  Straight leg raising is negative bilaterally, there is no tenderness at the elbows or wrist bilaterally, Tinel and Phalen signs absent.  Distal pulses are intact throughout.  There is no evidence of diffuse rash.     Neurological Exam    Fully oriented, there is no aphasia, apraxia, or inattention.    PERRLA/EOMI,  visual fields are full to finger counting on confrontation bilaterally.  Facial movements are symmetric, sensory exam is intact to light touch, temperature and pinprick bilaterally.  The tongue and uvula are midline.  Shoulder shrug and head rotation are normal.    Musculoskeletal exam reveals normal tone throughout, there is no tremor, asterixis, or drift.  Strength is 5/5 bilaterally.  Reflexes are diffusely hypoactive, biceps can be elicited, but triceps, brachioradialis, knee jerks and ankle jerks are all symmetrically diminished.  Both toes are downgoing.  There is no ankle clonus.    She stands easily, gait is normal and station and stride length, she does not look at the ground to maintain balance.  Heel, toe, and tandem walking are normal.  There is no appendicular dystaxia.  Fine motor control with repetitive movements is intact in all 4 extremities, amplitude and frequencies symmetric.    Sensory exam is intact to vibration, JPS, temperature and pinprick throughout.  Romberg is absent.    Assessment & Plan     1. Paresthesias  I doubt that a true neurologic disorder is at the root cause for these transient sensory distortions, especially given that she has had symptoms dating back almost 23 years.  With a benign neurologic examination this makes chronic autoimmune/inflammatory, degenerative, nutritional deficiency based diseases of central or peripheral nervous system much less likely. Medical causes of these types of episodic sensory distortions have already been assessed and have ruled out most of the autoimmune, nutritional or endocrine abnormalities that might be a cause.  MGUS can be associated with a small fiber sensory polyneuropathy, but that is not what we are dealing with here.  With an unremarkable examination, EMG/NCV studies would be of low yield.    For now, I simply encouraged her to get on with her life, there is no reason to focus on the symptoms.  Because we do not know why they have been  occurring, we cannot be accurate in terms of prognostication.  Since there is no underlying neurologic disorder, I can discharge her back into the care of her PCP.    Time: 60 minutes in total spent on patient care including pre-charting, record review, discussion with healthcare staff and documentation.  This includes face-to-face time for exam, review, discussion, as well as counseling and coordinating care.

## 2023-11-29 ENCOUNTER — PATIENT MESSAGE (OUTPATIENT)
Dept: HEALTH INFORMATION MANAGEMENT | Facility: OTHER | Age: 88
End: 2023-11-29

## 2023-11-30 DIAGNOSIS — E78.2 MIXED HYPERLIPIDEMIA: ICD-10-CM

## 2023-12-04 RX ORDER — SIMVASTATIN 20 MG
20 TABLET ORAL EVERY EVENING
Qty: 90 TABLET | Refills: 3 | Status: SHIPPED | OUTPATIENT
Start: 2023-12-04

## 2024-01-24 DIAGNOSIS — I10 ESSENTIAL HYPERTENSION: ICD-10-CM

## 2024-01-25 RX ORDER — CHLORTHALIDONE 25 MG/1
TABLET ORAL
Qty: 90 TABLET | Refills: 3 | Status: SHIPPED | OUTPATIENT
Start: 2024-01-25

## 2024-01-25 NOTE — TELEPHONE ENCOUNTER
Received request via: Pharmacy    Was the patient seen in the last year in this department? Yes    Does the patient have an active prescription (recently filled or refills available) for medication(s) requested? No    [facility]    Does the patient have [facility] Plus and need 100 day supply (blood pressure, diabetes and cholesterol meds only)? Patient does not have SCP

## 2024-03-06 DIAGNOSIS — I10 ESSENTIAL HYPERTENSION: ICD-10-CM

## 2024-03-06 DIAGNOSIS — E78.2 MIXED HYPERLIPIDEMIA: ICD-10-CM

## 2024-03-06 DIAGNOSIS — Z86.39 HISTORY OF VITAMIN D DEFICIENCY: ICD-10-CM

## 2024-03-06 DIAGNOSIS — E03.9 HYPOTHYROIDISM, UNSPECIFIED TYPE: ICD-10-CM

## 2024-03-08 ENCOUNTER — HOSPITAL ENCOUNTER (OUTPATIENT)
Dept: LAB | Facility: MEDICAL CENTER | Age: 89
End: 2024-03-08
Attending: FAMILY MEDICINE
Payer: MEDICARE

## 2024-03-08 DIAGNOSIS — E78.2 MIXED HYPERLIPIDEMIA: ICD-10-CM

## 2024-03-08 DIAGNOSIS — I10 ESSENTIAL HYPERTENSION: ICD-10-CM

## 2024-03-08 DIAGNOSIS — E03.9 HYPOTHYROIDISM, UNSPECIFIED TYPE: ICD-10-CM

## 2024-03-08 DIAGNOSIS — Z86.39 HISTORY OF VITAMIN D DEFICIENCY: ICD-10-CM

## 2024-03-08 LAB
25(OH)D3 SERPL-MCNC: 45 NG/ML (ref 30–100)
ALBUMIN SERPL BCP-MCNC: 4.1 G/DL (ref 3.2–4.9)
ALBUMIN/GLOB SERPL: 1.4 G/DL
ALP SERPL-CCNC: 58 U/L (ref 30–99)
ALT SERPL-CCNC: 15 U/L (ref 2–50)
ANION GAP SERPL CALC-SCNC: 10 MMOL/L (ref 7–16)
AST SERPL-CCNC: 19 U/L (ref 12–45)
BASOPHILS # BLD AUTO: 0.9 % (ref 0–1.8)
BASOPHILS # BLD: 0.06 K/UL (ref 0–0.12)
BILIRUB SERPL-MCNC: 1 MG/DL (ref 0.1–1.5)
BUN SERPL-MCNC: 23 MG/DL (ref 8–22)
CALCIUM ALBUM COR SERPL-MCNC: 9.2 MG/DL (ref 8.5–10.5)
CALCIUM SERPL-MCNC: 9.3 MG/DL (ref 8.5–10.5)
CHLORIDE SERPL-SCNC: 99 MMOL/L (ref 96–112)
CHOLEST SERPL-MCNC: 172 MG/DL (ref 100–199)
CO2 SERPL-SCNC: 31 MMOL/L (ref 20–33)
CREAT SERPL-MCNC: 0.59 MG/DL (ref 0.5–1.4)
EOSINOPHIL # BLD AUTO: 0.11 K/UL (ref 0–0.51)
EOSINOPHIL NFR BLD: 1.6 % (ref 0–6.9)
ERYTHROCYTE [DISTWIDTH] IN BLOOD BY AUTOMATED COUNT: 45.4 FL (ref 35.9–50)
GFR SERPLBLD CREATININE-BSD FMLA CKD-EPI: 86 ML/MIN/1.73 M 2
GLOBULIN SER CALC-MCNC: 2.9 G/DL (ref 1.9–3.5)
GLUCOSE SERPL-MCNC: 107 MG/DL (ref 65–99)
HCT VFR BLD AUTO: 49 % (ref 37–47)
HDLC SERPL-MCNC: 70 MG/DL
HGB BLD-MCNC: 16.1 G/DL (ref 12–16)
IMM GRANULOCYTES # BLD AUTO: 0.02 K/UL (ref 0–0.11)
IMM GRANULOCYTES NFR BLD AUTO: 0.3 % (ref 0–0.9)
LDLC SERPL CALC-MCNC: 89 MG/DL
LYMPHOCYTES # BLD AUTO: 1.2 K/UL (ref 1–4.8)
LYMPHOCYTES NFR BLD: 17.2 % (ref 22–41)
MCH RBC QN AUTO: 31.6 PG (ref 27–33)
MCHC RBC AUTO-ENTMCNC: 32.9 G/DL (ref 32.2–35.5)
MCV RBC AUTO: 96.3 FL (ref 81.4–97.8)
MONOCYTES # BLD AUTO: 0.76 K/UL (ref 0–0.85)
MONOCYTES NFR BLD AUTO: 10.9 % (ref 0–13.4)
NEUTROPHILS # BLD AUTO: 4.84 K/UL (ref 1.82–7.42)
NEUTROPHILS NFR BLD: 69.1 % (ref 44–72)
NRBC # BLD AUTO: 0 K/UL
NRBC BLD-RTO: 0 /100 WBC (ref 0–0.2)
PLATELET # BLD AUTO: 279 K/UL (ref 164–446)
PMV BLD AUTO: 11.2 FL (ref 9–12.9)
POTASSIUM SERPL-SCNC: 3.8 MMOL/L (ref 3.6–5.5)
PROT SERPL-MCNC: 7 G/DL (ref 6–8.2)
RBC # BLD AUTO: 5.09 M/UL (ref 4.2–5.4)
SODIUM SERPL-SCNC: 140 MMOL/L (ref 135–145)
TRIGL SERPL-MCNC: 67 MG/DL (ref 0–149)
TSH SERPL DL<=0.005 MIU/L-ACNC: 5.24 UIU/ML (ref 0.38–5.33)
WBC # BLD AUTO: 7 K/UL (ref 4.8–10.8)

## 2024-03-08 PROCEDURE — 80053 COMPREHEN METABOLIC PANEL: CPT

## 2024-03-08 PROCEDURE — 85025 COMPLETE CBC W/AUTO DIFF WBC: CPT

## 2024-03-08 PROCEDURE — 82306 VITAMIN D 25 HYDROXY: CPT | Mod: GA

## 2024-03-08 PROCEDURE — 80061 LIPID PANEL: CPT

## 2024-03-08 PROCEDURE — 36415 COLL VENOUS BLD VENIPUNCTURE: CPT

## 2024-03-08 PROCEDURE — 84443 ASSAY THYROID STIM HORMONE: CPT

## 2024-03-14 ENCOUNTER — OFFICE VISIT (OUTPATIENT)
Dept: MEDICAL GROUP | Facility: PHYSICIAN GROUP | Age: 89
End: 2024-03-14
Payer: MEDICARE

## 2024-03-14 VITALS
TEMPERATURE: 97.4 F | HEIGHT: 59 IN | OXYGEN SATURATION: 95 % | DIASTOLIC BLOOD PRESSURE: 88 MMHG | HEART RATE: 100 BPM | SYSTOLIC BLOOD PRESSURE: 128 MMHG | WEIGHT: 157 LBS | RESPIRATION RATE: 14 BRPM | BODY MASS INDEX: 31.65 KG/M2

## 2024-03-14 DIAGNOSIS — R73.01 IMPAIRED FASTING GLUCOSE: ICD-10-CM

## 2024-03-14 DIAGNOSIS — I10 ESSENTIAL HYPERTENSION: ICD-10-CM

## 2024-03-14 DIAGNOSIS — E78.2 MIXED HYPERLIPIDEMIA: ICD-10-CM

## 2024-03-14 DIAGNOSIS — Z87.828 HISTORY OF LACERATION OF SKIN: ICD-10-CM

## 2024-03-14 DIAGNOSIS — E03.9 HYPOTHYROIDISM, UNSPECIFIED TYPE: ICD-10-CM

## 2024-03-14 DIAGNOSIS — Z23 NEED FOR VACCINATION: ICD-10-CM

## 2024-03-14 PROCEDURE — 90715 TDAP VACCINE 7 YRS/> IM: CPT | Performed by: FAMILY MEDICINE

## 2024-03-14 PROCEDURE — 3074F SYST BP LT 130 MM HG: CPT | Performed by: FAMILY MEDICINE

## 2024-03-14 PROCEDURE — 3079F DIAST BP 80-89 MM HG: CPT | Performed by: FAMILY MEDICINE

## 2024-03-14 PROCEDURE — 90471 IMMUNIZATION ADMIN: CPT | Performed by: FAMILY MEDICINE

## 2024-03-14 PROCEDURE — 99214 OFFICE O/P EST MOD 30 MIN: CPT | Mod: 25 | Performed by: FAMILY MEDICINE

## 2024-03-14 ASSESSMENT — FIBROSIS 4 INDEX: FIB4 SCORE: 1.55

## 2024-03-14 ASSESSMENT — PATIENT HEALTH QUESTIONNAIRE - PHQ9: CLINICAL INTERPRETATION OF PHQ2 SCORE: 0

## 2024-03-14 NOTE — PROGRESS NOTES
"CHIEF COMPLAINT / REASON FOR VISIT  Corrinne Alice Carmignani is a 88 y.o. female that presents today for   Chief Complaint   Patient presents with    Follow-Up     HISTORY OF PRESENT ILLNESS  Wondering about RSV immunization and COVID booster.     Recent right dorsal hand laceration    Wants to discuss labs    Fasting glucose levels run in the high 90s to low 100s range    OBJECTIVE     /88 (BP Location: Right arm, Patient Position: Sitting, BP Cuff Size: Adult)   Pulse 100   Temp 36.3 °C (97.4 °F) (Temporal)   Resp 14   Ht 1.499 m (4' 11\")   Wt 71.2 kg (157 lb)   SpO2 95%   BMI 31.71 kg/m²      PHYSICAL EXAM  Constitutional: Sitting comfortably, in no acute distress, responds to questions appropriately.  Skin: Warm and dry    ASSESSMENT & PLAN  1. Need for vaccination  2. History of laceration of skin  Recent right dorsal hand laceration, Tdap indicated  - Tdap Vaccine =>8YO IM    3. Impaired fasting glucose  Mildly elevated fasting glucoses.  At this point unlikely to progress to type 2 diabetes given age and stability of glucoses (she checks her fasting glucose every morning at home).  Advised healthy lifestyle    4. Mixed hyperlipidemia  Chronic, well-controlled on simvastatin 20 mg daily.    5. Hypothyroidism, unspecified type  Chronic, controlled with levothyroxine 25 mcg daily, TSH within normal range    6. Essential hypertension  Chronic, controlled on chlorthalidone 25 mg daily.  Continue current therapy.    HCC Gap Form    Diagnosis to address: I70.0 - Aortic atherosclerosis (HCC)  Assessment and plan: Chronic, stable.  Incidental finding on imaging.  Continue with current defined treatment plan: Simvastatin 20 mg daily. Follow-up at least annually.  Diagnosis: G31.9 - Cerebral atrophy (HCC)  Assessment and plan: Chronic, stable.  Incidental finding on imaging.  Continue with current defined treatment plan: No intervention required. Follow-up at least annually.  Last edited 03/14/24 12:13 PDT " by Grant Austin M.D.

## 2024-05-08 ENCOUNTER — DOCUMENTATION (OUTPATIENT)
Dept: HEALTH INFORMATION MANAGEMENT | Facility: OTHER | Age: 89
End: 2024-05-08
Payer: MEDICARE

## 2024-05-10 DIAGNOSIS — M17.11 PRIMARY OSTEOARTHRITIS OF RIGHT KNEE: ICD-10-CM

## 2024-05-10 DIAGNOSIS — M25.561 ACUTE PAIN OF RIGHT KNEE: ICD-10-CM

## 2024-05-13 NOTE — TELEPHONE ENCOUNTER
Received request via: Pharmacy    Was the patient seen in the last year in this department? Yes 3/14/2024    Does the patient have an active prescription (recently filled or refills available) for medication(s) requested? No    [facility]    Does the patient have [facility] and need 100 day supply (blood pressure, diabetes and cholesterol meds only)? Patient does not have SCP

## 2024-05-21 ENCOUNTER — OFFICE VISIT (OUTPATIENT)
Dept: MEDICAL GROUP | Facility: PHYSICIAN GROUP | Age: 89
End: 2024-05-21
Payer: MEDICARE

## 2024-05-21 VITALS
SYSTOLIC BLOOD PRESSURE: 112 MMHG | HEIGHT: 59 IN | TEMPERATURE: 97.9 F | RESPIRATION RATE: 16 BRPM | HEART RATE: 86 BPM | OXYGEN SATURATION: 94 % | DIASTOLIC BLOOD PRESSURE: 80 MMHG | BODY MASS INDEX: 32.25 KG/M2 | WEIGHT: 160 LBS

## 2024-05-21 DIAGNOSIS — I83.893 VARICOSE VEINS OF BILATERAL LOWER EXTREMITIES WITH OTHER COMPLICATIONS: ICD-10-CM

## 2024-05-21 DIAGNOSIS — M17.11 PRIMARY OSTEOARTHRITIS OF RIGHT KNEE: ICD-10-CM

## 2024-05-21 DIAGNOSIS — G89.29 CHRONIC LEFT-SIDED LOW BACK PAIN WITH LEFT-SIDED SCIATICA: ICD-10-CM

## 2024-05-21 DIAGNOSIS — D47.2 IGG LAMBDA MONOCLONAL GAMMOPATHY: ICD-10-CM

## 2024-05-21 DIAGNOSIS — M85.89 OSTEOPENIA OF MULTIPLE SITES: ICD-10-CM

## 2024-05-21 DIAGNOSIS — G31.9 CEREBRAL ATROPHY (HCC): ICD-10-CM

## 2024-05-21 DIAGNOSIS — E03.9 HYPOTHYROIDISM, UNSPECIFIED TYPE: ICD-10-CM

## 2024-05-21 DIAGNOSIS — M54.42 CHRONIC LEFT-SIDED LOW BACK PAIN WITH LEFT-SIDED SCIATICA: ICD-10-CM

## 2024-05-21 DIAGNOSIS — Z76.89 ENCOUNTER TO ESTABLISH CARE: ICD-10-CM

## 2024-05-21 DIAGNOSIS — Z99.89 USE OF CANE AS AMBULATORY AID: ICD-10-CM

## 2024-05-21 DIAGNOSIS — E78.2 MIXED HYPERLIPIDEMIA: ICD-10-CM

## 2024-05-21 DIAGNOSIS — M25.561 ACUTE PAIN OF RIGHT KNEE: ICD-10-CM

## 2024-05-21 DIAGNOSIS — E66.9 OBESITY (BMI 30-39.9): ICD-10-CM

## 2024-05-21 DIAGNOSIS — Z85.3 HISTORY OF BREAST CANCER: ICD-10-CM

## 2024-05-21 DIAGNOSIS — I10 ESSENTIAL HYPERTENSION: ICD-10-CM

## 2024-05-21 PROCEDURE — 3074F SYST BP LT 130 MM HG: CPT

## 2024-05-21 PROCEDURE — 99214 OFFICE O/P EST MOD 30 MIN: CPT

## 2024-05-21 PROCEDURE — 3079F DIAST BP 80-89 MM HG: CPT

## 2024-05-21 RX ORDER — LEVOTHYROXINE SODIUM 0.03 MG/1
25 TABLET ORAL
Qty: 90 TABLET | Refills: 3 | Status: SHIPPED | OUTPATIENT
Start: 2024-05-21

## 2024-05-21 RX ORDER — SIMVASTATIN 20 MG
20 TABLET ORAL EVERY EVENING
Qty: 90 TABLET | Refills: 3 | Status: SHIPPED | OUTPATIENT
Start: 2024-05-21

## 2024-05-21 RX ORDER — DENOSUMAB 60 MG/ML
60 INJECTION SUBCUTANEOUS
COMMUNITY

## 2024-05-21 RX ORDER — CHLORTHALIDONE 25 MG/1
25 TABLET ORAL DAILY
Qty: 90 TABLET | Refills: 3 | Status: SHIPPED | OUTPATIENT
Start: 2024-05-21

## 2024-05-21 ASSESSMENT — FIBROSIS 4 INDEX: FIB4 SCORE: 1.55

## 2024-05-21 ASSESSMENT — ENCOUNTER SYMPTOMS: BACK PAIN: 1

## 2024-05-21 NOTE — ASSESSMENT & PLAN NOTE
Chronic, stable. Continue levothyroxine 25 mcg daily Discussed increasing dose to 2 (25 mcg) tabs twice weekly. Will recheck tsh

## 2024-05-21 NOTE — ASSESSMENT & PLAN NOTE
Chronic, ongoing. Has seen pain specialist for this, steroid injections periodically for pain relief. Also has pain relief with neurontin 100 mg 1-3 times daily as needed. Continue this.

## 2024-05-21 NOTE — ASSESSMENT & PLAN NOTE
"Chronic, stable.   MRI 4/7/23 \"Age-related volume loss and chronic microvascular ischemic changes.\"  Mild aortic stenosis on TTE 3/2/23 -   "

## 2024-05-21 NOTE — PROGRESS NOTES
"Subjective:     CC: Diagnoses of Encounter to establish care, Chronic left-sided low back pain with left-sided sciatica, Osteopenia of multiple sites, Hypothyroidism, unspecified type, Mixed hyperlipidemia, Essential hypertension, Varicose veins of bilateral lower extremities with other complications, Acute pain of right knee, Primary osteoarthritis of right knee, Use of cane as ambulatory aid, IgG lambda monoclonal gammopathy, Cerebral atrophy (HCC), History of breast cancer, and Obesity (BMI 30-39.9) were pertinent to this visit.    Pt presents today to establish care with me. Prior pcp Chris Austin.     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  member of [employer].   She stays very active, likes to ksenia rosas,     She is  ([birth year]22).   Son lives close by. Living independently.    HPI:   Corrinne presents today with    Problem   Chronic Left-Sided Low Back Pain With Left-Sided Sciatica   Obesity (Bmi 30-39.9)   Igg Lambda Monoclonal Gammopathy   Primary Osteoarthritis of Right Knee   Use of Cane As Ambulatory Aid   Cerebral Atrophy (Hcc)   Varicose Veins of Bilateral Lower Extremities With Other Complications   Osteopenia of Multiple Sites    On prolia     Essential Hypertension   Hypothyroidism   History of Breast Cancer   Hyperlipidemia     ROS:  Review of Systems   Cardiovascular:         Varicosities to LE   Musculoskeletal:  Positive for back pain and joint pain (knee).   All other systems reviewed and are negative.      Objective:     Exam:  /80 (BP Location: Right arm, Patient Position: Sitting, BP Cuff Size: Adult)   Pulse 86   Temp 36.6 °C (97.9 °F) (Temporal)   Resp 16   Ht 1.51 m (4' 11.45\")   Wt 72.6 kg (160 lb)   SpO2 94%   BMI 31.83 kg/m²  Body mass index is 31.83 kg/m².    Physical Exam  Vitals reviewed.   Constitutional:       General: She is not " in acute distress.     Appearance: Normal appearance. She is not ill-appearing.   HENT:      Head: Normocephalic and atraumatic.   Cardiovascular:      Rate and Rhythm: Normal rate.      Pulses: Normal pulses.   Pulmonary:      Effort: Pulmonary effort is normal. No respiratory distress.   Skin:     General: Skin is warm and dry.      Findings: No rash.   Neurological:      General: No focal deficit present.      Mental Status: She is alert and oriented to person, place, and time.   Psychiatric:         Mood and Affect: Mood normal.         Behavior: Behavior normal.         Labs:    Latest Reference Range & Units 03/08/24 08:05   WBC 4.8 - 10.8 K/uL 7.0   RBC 4.20 - 5.40 M/uL 5.09   Hemoglobin 12.0 - 16.0 g/dL 16.1 (H)   Hematocrit 37.0 - 47.0 % 49.0 (H)   MCV 81.4 - 97.8 fL 96.3   MCH 27.0 - 33.0 pg 31.6   MCHC 32.2 - 35.5 g/dL 32.9   RDW 35.9 - 50.0 fL 45.4   Platelet Count 164 - 446 K/uL 279   MPV 9.0 - 12.9 fL 11.2   Neutrophils-Polys 44.00 - 72.00 % 69.10   Neutrophils (Absolute) 1.82 - 7.42 K/uL 4.84   Lymphocytes 22.00 - 41.00 % 17.20 (L)   Lymphs (Absolute) 1.00 - 4.80 K/uL 1.20   Monocytes 0.00 - 13.40 % 10.90   Monos (Absolute) 0.00 - 0.85 K/uL 0.76   Eosinophils 0.00 - 6.90 % 1.60   Eos (Absolute) 0.00 - 0.51 K/uL 0.11   Basophils 0.00 - 1.80 % 0.90   Baso (Absolute) 0.00 - 0.12 K/uL 0.06   Immature Granulocytes 0.00 - 0.90 % 0.30   Immature Granulocytes (abs) 0.00 - 0.11 K/uL 0.02   Nucleated RBC 0.00 - 0.20 /100 WBC 0.00   NRBC (Absolute) K/uL 0.00   Sodium 135 - 145 mmol/L 140   Potassium 3.6 - 5.5 mmol/L 3.8   Chloride 96 - 112 mmol/L 99   Co2 20 - 33 mmol/L 31   Anion Gap 7.0 - 16.0  10.0   Glucose 65 - 99 mg/dL 107 (H)   Bun 8 - 22 mg/dL 23 (H)   Creatinine 0.50 - 1.40 mg/dL 0.59   GFR (CKD-EPI) >60 mL/min/1.73 m 2 86   Calcium 8.5 - 10.5 mg/dL 9.3   Correct Calcium 8.5 - 10.5 mg/dL 9.2   AST(SGOT) 12 - 45 U/L 19   ALT(SGPT) 2 - 50 U/L 15   Alkaline Phosphatase 30 - 99 U/L 58   Total Bilirubin 0.1  - 1.5 mg/dL 1.0   Albumin 3.2 - 4.9 g/dL 4.1   Total Protein 6.0 - 8.2 g/dL 7.0   Globulin 1.9 - 3.5 g/dL 2.9   A-G Ratio g/dL 1.4   Cholesterol,Tot 100 - 199 mg/dL 172   Triglycerides 0 - 149 mg/dL 67   HDL >=40 mg/dL 70   LDL <100 mg/dL 89   25-Hydroxy   Vitamin D 25 30 - 100 ng/mL 45   TSH 0.380 - 5.330 uIU/mL 5.240   (H): Data is abnormally high  (L): Data is abnormally low    Assessment & Plan:     88 y.o. female with the following -     Problem List Items Addressed This Visit          Family Medicine Problems    Essential hypertension     Chronic, stable. Bp in clinic today 112/80. Continue chlorthalidone 25 mg daily         Relevant Medications    chlorthalidone (HYGROTON) 25 MG Tab    simvastatin (ZOCOR) 20 MG Tab    Chronic left-sided low back pain with left-sided sciatica     Chronic, ongoing. Has seen pain specialist for this, steroid injections periodically for pain relief. Also has pain relief with neurontin 100 mg 1-3 times daily as needed. Continue this.            Other    Hyperlipidemia     Chronic, stable. Continue simvastatin 20 mg daily, asa 81 mg daily         Relevant Medications    chlorthalidone (HYGROTON) 25 MG Tab    simvastatin (ZOCOR) 20 MG Tab    History of breast cancer     Chronic, stable. Follow oncology for routine follow up care. S/p right mastectomy         Hypothyroidism     Chronic, stable. Continue levothyroxine 25 mcg daily Discussed increasing dose to 2 (25 mcg) tabs twice weekly. Will recheck tsh          Relevant Medications    levothyroxine (SYNTHROID) 25 MCG Tab    Other Relevant Orders    TSH    Osteopenia of multiple sites     Chronic, ongoing. Seeing hematology for this. Getting prolia every 6 months.         Varicose veins of bilateral lower extremities with other complications     Chronic, ongoing. Using compression socks daily, reports she is getting this OTC           Relevant Medications    chlorthalidone (HYGROTON) 25 MG Tab    simvastatin (ZOCOR) 20 MG Tab     "Cerebral atrophy (HCC)     Chronic, stable.   MRI 4/7/23 \"Age-related volume loss and chronic microvascular ischemic changes.\"  Mild aortic stenosis on TTE 3/2/23 -          Primary osteoarthritis of right knee     Chronic, ongoing. Has relief with topical nsaid. Continue this.         Relevant Medications    diclofenac sodium (VOLTAREN) 1 % Gel    Use of cane as ambulatory aid     Chronic, ongoing, as needed with increased sciatic pain. Reports she keeps cane in car as needed.         IgG lambda monoclonal gammopathy     Chronic, stable. Seeing oncology for ongoing management.         Obesity (BMI 30-39.9)     Chronic, stable. Discussed healthy lifestyle recommendations.            Relevant Orders    Patient identified as having weight management issue.  Appropriate orders and counseling given.     Other Visit Diagnoses       Encounter to establish care        Acute pain of right knee        Relevant Medications    diclofenac sodium (VOLTAREN) 1 % Gel          Patient was educated in proper administration of medication(s) ordered today including safety, possible SE, risks, benefits, rationale and alternatives to therapy.   Supportive care, differential diagnoses, and indications for immediate follow-up discussed with patient.    Pathogenesis of diagnosis discussed including typical length and natural progression.    Instructed to return to clinic or nearest emergency department for any change in condition, further concerns, or worsening of symptoms.  Patient states understanding of the plan of care and discharge instructions.    Return in about 6 months (around 11/21/2024) for Labs: thyroid, Annual Wellness.    Please note that this dictation was created using voice recognition software. I have made every reasonable attempt to correct obvious errors, but I expect that there are errors of grammar and possibly content that I did not discover before finalizing the note.        "

## 2024-06-11 ENCOUNTER — OFFICE VISIT (OUTPATIENT)
Dept: URGENT CARE | Facility: PHYSICIAN GROUP | Age: 89
End: 2024-06-11
Payer: MEDICARE

## 2024-06-11 ENCOUNTER — APPOINTMENT (OUTPATIENT)
Dept: RADIOLOGY | Facility: IMAGING CENTER | Age: 89
End: 2024-06-11
Payer: MEDICARE

## 2024-06-11 VITALS
TEMPERATURE: 98 F | RESPIRATION RATE: 16 BRPM | BODY MASS INDEX: 32.05 KG/M2 | HEART RATE: 98 BPM | WEIGHT: 159 LBS | SYSTOLIC BLOOD PRESSURE: 138 MMHG | DIASTOLIC BLOOD PRESSURE: 70 MMHG | OXYGEN SATURATION: 92 % | HEIGHT: 59 IN

## 2024-06-11 DIAGNOSIS — R19.7 DIARRHEA, UNSPECIFIED TYPE: ICD-10-CM

## 2024-06-11 DIAGNOSIS — R05.1 ACUTE COUGH: ICD-10-CM

## 2024-06-11 LAB
FLUAV RNA SPEC QL NAA+PROBE: NEGATIVE
FLUBV RNA SPEC QL NAA+PROBE: NEGATIVE
RSV RNA SPEC QL NAA+PROBE: NEGATIVE
SARS-COV-2 RNA RESP QL NAA+PROBE: NEGATIVE

## 2024-06-11 PROCEDURE — 99213 OFFICE O/P EST LOW 20 MIN: CPT

## 2024-06-11 PROCEDURE — 0241U POCT CEPHEID COV-2, FLU A/B, RSV - PCR: CPT

## 2024-06-11 PROCEDURE — 71046 X-RAY EXAM CHEST 2 VIEWS: CPT | Mod: TC

## 2024-06-11 ASSESSMENT — FIBROSIS 4 INDEX: FIB4 SCORE: 1.55

## 2024-06-11 NOTE — PROGRESS NOTES
"  Chief Complaint   Patient presents with    Cough     Couple of days , diarrhea           Subjective:   HISTORY OF PRESENT ILLNESS: Corrinne Alice Carmignani is a 88 y.o. female who presents for cough and diarrhea x 2 days.  Worsening last night.  She is tolerating PO well   Patient denies fevers and SOB.  No abdominal pain or dysuria.    Medications, Allergies, current problem list, Social and Family history reviewed today in Epic.     Objective:     /70 (BP Location: Right arm, Patient Position: Sitting, BP Cuff Size: Adult)   Pulse 98   Temp 36.7 °C (98 °F) (Temporal)   Resp 16   Ht 1.499 m (4' 11\")   Wt 72.1 kg (159 lb)   SpO2 92%     Physical Exam  Vitals reviewed.   Constitutional:       Appearance: Normal appearance. She is not toxic-appearing.   HENT:      Head:      Jaw: No trismus.      Right Ear: Tympanic membrane normal.      Left Ear: Tympanic membrane normal.      Nose: Rhinorrhea present.      Mouth/Throat:      Mouth: Mucous membranes are moist.      Pharynx: Uvula midline. Posterior oropharyngeal erythema present. No pharyngeal swelling, oropharyngeal exudate or uvula swelling.      Tonsils: No tonsillar exudate or tonsillar abscesses. 1+ on the right. 1+ on the left.      Comments: No muffled voice, trismus, unilateral deviation of the uvula, soft palate fullness or edema. No oral airway compromise, or drooling noted.   Eyes:      Conjunctiva/sclera: Conjunctivae normal.   Cardiovascular:      Rate and Rhythm: Normal rate and regular rhythm.      Heart sounds: Normal heart sounds.   Pulmonary:      Effort: Pulmonary effort is normal. No respiratory distress.      Breath sounds: Normal breath sounds. Decreased air movement present. No decreased breath sounds or wheezing.      Comments: Gernealized decrased air movement  Musculoskeletal:      Cervical back: Full passive range of motion without pain and neck supple.   Skin:     General: Skin is warm and dry.   Neurological:      Mental " Status: She is alert and oriented to person, place, and time.   Psychiatric:         Mood and Affect: Mood normal.          Assessment/Plan:     Diagnosis and associated orders    I personally reviewed prior external notes and test results pertinent to today's visit.     1. Acute cough  POCT CoV-2, Flu A/B, RSV by PCR    DX-CHEST-2 VIEWS      2. Diarrhea, unspecified type          Results for orders placed or performed in visit on 06/11/24   POCT CoV-2, Flu A/B, RSV by PCR   Result Value Ref Range    SARS-CoV-2 by PCR Negative Negative, Invalid    Influenza virus A RNA Negative Negative, Invalid    Influenza virus B, PCR Negative Negative, Invalid    RSV, PCR Negative Negative, Invalid      Today's radiology imaging personally reviewed by me today on day of visit and Radiology readings reviewed and discussed w/ patient today.     RADIOLOGY RESULTS   DX-CHEST-2 VIEWS    Result Date: 6/11/2024 6/11/2024 10:01 AM HISTORY/REASON FOR EXAM:  Cough. TECHNIQUE/EXAM DESCRIPTION AND NUMBER OF VIEWS: Two views of the chest. COMPARISON:  12/16/2018 FINDINGS: Cardiomediastinal silhouette is normal. Aortic calcified atherosclerotic plaque. No focal consolidation, pleural effusion, pulmonary edema or pneumothorax. No acute osseous abnormality.     No acute cardiopulmonary abnormality.            IMPRESSION:  Pt has stable vital signs and no red flag symptoms or exam findings identified.  Her o2 saturation is low at 92% so I did obtain a chest xray that shows no active disease.   Informed pt that symptoms are consistent with a viral illness and are self limiting.  Informed that no antibiotics are indicated at this time.  Educated on duration of illness and indications to RTC.  Recommended supportive therapies and preferred OTC medications for symptomatic relief.  She reports OTC seem to working well for her.       Differential diagnosis discussed. Pt was Educated on red flag symptoms. Pt has been Instructed to return to Urgent  Care or nearest Emergency Department if symptoms fail to improve, for any change in condition, further concerns, or new concerning symptoms. Patient states understanding of the plan of care and discharge instructions.  They are discharged in stable condition.         Please note that this dictation was created using voice recognition software. I have made a reasonable attempt to correct obvious errors, but I expect that there are errors of grammar and possibly content that I did not discover before finalizing the note.    This note was electronically signed by VICENTA Nichols

## 2024-06-17 ENCOUNTER — OFFICE VISIT (OUTPATIENT)
Dept: URGENT CARE | Facility: PHYSICIAN GROUP | Age: 89
End: 2024-06-17
Payer: MEDICARE

## 2024-06-17 VITALS
TEMPERATURE: 97.6 F | DIASTOLIC BLOOD PRESSURE: 80 MMHG | OXYGEN SATURATION: 92 % | BODY MASS INDEX: 31.25 KG/M2 | HEART RATE: 83 BPM | WEIGHT: 155 LBS | HEIGHT: 59 IN | RESPIRATION RATE: 16 BRPM | SYSTOLIC BLOOD PRESSURE: 150 MMHG

## 2024-06-17 DIAGNOSIS — I10 ELEVATED BLOOD PRESSURE READING WITH DIAGNOSIS OF HYPERTENSION: ICD-10-CM

## 2024-06-17 DIAGNOSIS — J22 LOWER RESPIRATORY TRACT INFECTION: ICD-10-CM

## 2024-06-17 PROCEDURE — 99214 OFFICE O/P EST MOD 30 MIN: CPT

## 2024-06-17 PROCEDURE — 3077F SYST BP >= 140 MM HG: CPT

## 2024-06-17 PROCEDURE — 3079F DIAST BP 80-89 MM HG: CPT

## 2024-06-17 RX ORDER — AMOXICILLIN AND CLAVULANATE POTASSIUM 875; 125 MG/1; MG/1
1 TABLET, FILM COATED ORAL 2 TIMES DAILY
Qty: 10 TABLET | Refills: 0 | Status: SHIPPED | OUTPATIENT
Start: 2024-06-17 | End: 2024-06-22

## 2024-06-17 RX ORDER — PREDNISONE 20 MG/1
20 TABLET ORAL 2 TIMES DAILY
Qty: 6 TABLET | Refills: 0 | Status: SHIPPED | OUTPATIENT
Start: 2024-06-17 | End: 2024-06-20

## 2024-06-17 ASSESSMENT — ENCOUNTER SYMPTOMS
CHILLS: 0
SHORTNESS OF BREATH: 0
ABDOMINAL PAIN: 0
COUGH: 1
VOMITING: 0
SORE THROAT: 0
NAUSEA: 0
FEVER: 0

## 2024-06-17 ASSESSMENT — FIBROSIS 4 INDEX: FIB4 SCORE: 1.55

## 2024-06-17 NOTE — PROGRESS NOTES
Chief Complaint   Patient presents with    Cough     Cough has not improved from last visit        HISTORY OF PRESENT ILLNESS: Patient is a pleasant 88 y.o. female who presents to urgent care today patient was seen here last week and treated for a viral illness.  She did have a chest x-ray at this time, this was negative for anything significant, she returns today stating ongoing cough with no major improvement of her symptoms despite OTC medications.  She denies any fevers, no major shortness of breath.    Patient Active Problem List    Diagnosis Date Noted    Chronic left-sided low back pain with left-sided sciatica 05/21/2024    Obesity (BMI 30-39.9) 05/21/2024    IgG lambda monoclonal gammopathy 09/12/2023    Hypomagnesemia 08/29/2023    Elevated serum immunoglobulin free light chain level 08/29/2023    Primary osteoarthritis of right knee 06/16/2023    Use of cane as ambulatory aid 06/16/2023    Cerebral atrophy (HCC) 04/14/2023    Elevated vitamin B12 level 04/14/2023    Aortic stenosis, mild 03/10/2023    Paresthesias 03/10/2023    Varicose veins of bilateral lower extremities with other complications 01/20/2023    Aortic atherosclerosis (HCC) 09/23/2022    Sigmoid diverticulosis 09/23/2022    BMI 31.0-31.9,adult 02/03/2022    History of vitamin D deficiency 02/03/2022    Aortic valve sclerosis 02/03/2022    Mild left ventricular hypertrophy 02/03/2022    Osteopenia of multiple sites 04/07/2020    Essential hypertension 10/11/2019    DDD (degenerative disc disease), lumbar 03/09/2018    Edema of both lower extremities due to peripheral venous insufficiency 03/09/2018    Hypothyroidism 02/23/2016    History of breast cancer 01/26/2016    Hyperlipidemia 02/22/2010       Allergies:Fosamax [alendronate sodium]    Current Outpatient Medications Ordered in Epic   Medication Sig Dispense Refill    amoxicillin-clavulanate (AUGMENTIN) 875-125 MG Tab Take 1 Tablet by mouth 2 times a day for 5 days. 10 Tablet 0     predniSONE (DELTASONE) 20 MG Tab Take 1 Tablet by mouth 2 times a day for 3 days. 6 Tablet 0    denosumab (PROLIA) 60 MG/ML Solution Prefilled Syringe injection Inject 60 mg under the skin every 6 months. Indications: Osteoporosis      Calcium Citrate-Vitamin D (CALCIUM + D PO) Take  by mouth.      Cyanocobalamin (VITAMIN B-12 PO) Take  by mouth.      VITAMIN E PO Take  by mouth.      chlorthalidone (HYGROTON) 25 MG Tab Take 1 Tablet by mouth every day. 90 Tablet 3    diclofenac sodium (VOLTAREN) 1 % Gel Apply 4 g topically 4 times a day as needed (joint pain). 300 g 3    levothyroxine (SYNTHROID) 25 MCG Tab Take 1 Tablet by mouth every morning on an empty stomach. 90 Tablet 3    simvastatin (ZOCOR) 20 MG Tab Take 1 Tablet by mouth every evening. 90 Tablet 3    gabapentin (NEURONTIN) 100 MG Cap       vitamin E 180 mg (400 units) Cap Take 180 mg by mouth every day.      aspirin (ASA) 81 MG Chew Tab chewable tablet Take 81 mg by mouth every day.      Multiple Vitamin (MULTI-VITAMIN DAILY PO) Take  by mouth.       No current Epic-ordered facility-administered medications on file.       Past Medical History:   Diagnosis Date    Arthritis     shoulder    Back pain     Cancer (HCC)     Cataract     genna IOLI    DDD (degenerative disc disease), lumbar 3/9/2018    Dental disorder     lower partial    Healthcare maintenance 3/9/2018    Hyperlipidemia     Hypothyroid     Lumbar spondylosis     with intermittent radiculopathy    Peripheral edema 3/9/2018    Prediabetes 2020    Snoring     sleep study done, negative, years ago       Social History     Tobacco Use    Smoking status: Former     Current packs/day: 0.00     Average packs/day: 0.5 packs/day for 45.0 years (22.5 ttl pk-yrs)     Types: Cigarettes     Start date: 1953     Quit date: 1998     Years since quittin.4    Smokeless tobacco: Never    Tobacco comments:     avoid all tobacco products   Vaping Use    Vaping status: Never Used   Substance Use  "Topics    Alcohol use: Yes     Alcohol/week: 0.0 oz     Comment: 7/year    Drug use: No       Family Status   Relation Name Status    Mo      Fa Peritenitus     Son      Son  Alive    Neg Hx  (Not Specified)     Family History   Problem Relation Age of Onset    Heart Disease Mother     Other Father     Drug abuse Son     Cancer Neg Hx        Review of Systems   Constitutional:  Negative for chills, fever and malaise/fatigue.   HENT:  Positive for congestion. Negative for ear pain and sore throat.    Respiratory:  Positive for cough. Negative for shortness of breath.    Gastrointestinal:  Negative for abdominal pain, nausea and vomiting.   Skin:  Negative for rash.       Exam:  BP (!) 150/80 (BP Location: Right arm, Patient Position: Sitting, BP Cuff Size: Adult)   Pulse 83   Temp 36.4 °C (97.6 °F) (Temporal)   Resp 16   Ht 1.499 m (4' 11\")   Wt 70.3 kg (155 lb)   SpO2 92%   Physical Exam  Constitutional:       Appearance: Normal appearance. She is normal weight. She is not toxic-appearing.   HENT:      Head: Normocephalic.      Right Ear: Tympanic membrane, ear canal and external ear normal. There is no impacted cerumen.      Left Ear: Tympanic membrane, ear canal and external ear normal. There is no impacted cerumen.      Nose: Congestion present. No nasal tenderness, mucosal edema or rhinorrhea.      Mouth/Throat:      Mouth: Mucous membranes are moist.      Pharynx: No oropharyngeal exudate or posterior oropharyngeal erythema.      Tonsils: No tonsillar exudate. 1+ on the right. 1+ on the left.   Eyes:      General:         Right eye: No discharge.         Left eye: No discharge.      Extraocular Movements: Extraocular movements intact.      Conjunctiva/sclera: Conjunctivae normal.      Pupils: Pupils are equal, round, and reactive to light.   Cardiovascular:      Rate and Rhythm: Normal rate and regular rhythm.      Pulses: Normal pulses.      Heart sounds: Normal heart sounds. No " murmur heard.  Pulmonary:      Effort: Pulmonary effort is normal. No respiratory distress.      Breath sounds: Normal breath sounds. No stridor. No wheezing, rhonchi or rales.      Comments: Positive congested cough  Musculoskeletal:         General: No swelling, tenderness, deformity or signs of injury.      Cervical back: Normal range of motion and neck supple. No tenderness.      Right lower leg: No edema.      Left lower leg: No edema.   Skin:     General: Skin is warm and dry.      Capillary Refill: Capillary refill takes less than 2 seconds.      Findings: No bruising, erythema, lesion or rash.   Neurological:      General: No focal deficit present.      Mental Status: She is alert.      Sensory: No sensory deficit.      Motor: No weakness.      Coordination: Coordination normal.      Gait: Gait normal.   Psychiatric:         Mood and Affect: Mood normal.         Behavior: Behavior normal.         Thought Content: Thought content normal.         Judgment: Judgment normal.           Assessment/Plan:  1. Lower respiratory tract infection  - amoxicillin-clavulanate (AUGMENTIN) 875-125 MG Tab; Take 1 Tablet by mouth 2 times a day for 5 days.  Dispense: 10 Tablet; Refill: 0  - predniSONE (DELTASONE) 20 MG Tab; Take 1 Tablet by mouth 2 times a day for 3 days.  Dispense: 6 Tablet; Refill: 0    2. Elevated blood pressure reading with diagnosis of hypertension  - Referral back to PCP    Based on physical exam along with review of systems I did provide Augmentin today as patient has been sick for over a week with no relief from over-the-counter cold medications.  Medications in the form of short course of prednisone sent as well.  Patient advised take medication with food, drink plenty of fluids.    Patient has noted elevated hypertension today here in the office.  They are currently on medications for this.  Education provided on uncontrolled hypertension as well as the need to keep a log.  Referral was placed for  primary care, patient advised to follow-up with them and present to the log for ongoing management.  Patient currently denies any symptoms associated with hypertension.    Supportive care, differential diagnoses, and indications for immediate follow-up discussed with patient.   Pathogenesis of diagnosis discussed including typical length and natural progression.   Instructed to return to clinic or nearest emergency department for any change in condition, further concerns, or worsening of symptoms.  Patient states understanding of the plan of care and discharge instructions.  Instructed to make an appointment, for follow up, with primary care provider.    Please note that this dictation was created using voice recognition software. I have made every reasonable attempt to correct obvious errors, but I expect that there are errors of grammar and possibly content that I did not discover before finalizing the note.      Celina BAKER

## 2024-08-12 NOTE — PROGRESS NOTES
E-Consult Response     After careful review of the patient's information available in the medical record, the following are my findings and recommendations:    Reason for consult:  87 female with an IgG lambda monoclonal protein    Summary of data reviewed:    Latest Reference Range & Units 03/10/23 09:52   Immunoglobulin A 68 - 408 mg/dL 76   Immunoglobulin G 768 - 1632 mg/dL 1114   Immunoglobulin M 35 - 263 mg/dL 49   : Monoclonal spike in the gamma region. 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 At age 87 you wouldn't do much, especially with a normal CBC, BUN, creatinine and Ca++. I would repeat an electrophoresis but order light chains as well maybe in 3 or 6 months. Or you can send her here and we can follow - it would be 3 months before I could see her anyway.     E-Consult Time: 10 minutes were spent with >50% of the total time spent reviewing items outlined in the summary of data reviewed (Use code [id number])    Rick Cabrera M.D.      
CHEST PAIN

## 2024-09-06 ENCOUNTER — HOSPITAL ENCOUNTER (OUTPATIENT)
Facility: MEDICAL CENTER | Age: 89
End: 2024-09-06
Attending: INTERNAL MEDICINE
Payer: MEDICARE

## 2024-09-06 LAB
ALBUMIN SERPL BCP-MCNC: 4 G/DL (ref 3.2–4.9)
ALBUMIN/GLOB SERPL: 1.7 G/DL
ALP SERPL-CCNC: 68 U/L (ref 30–99)
ALT SERPL-CCNC: 13 U/L (ref 2–50)
ANION GAP SERPL CALC-SCNC: 11 MMOL/L (ref 7–16)
AST SERPL-CCNC: 9 U/L (ref 12–45)
BILIRUB SERPL-MCNC: 1 MG/DL (ref 0.1–1.5)
BUN SERPL-MCNC: 19 MG/DL (ref 8–22)
CALCIUM ALBUM COR SERPL-MCNC: 9.9 MG/DL (ref 8.5–10.5)
CALCIUM SERPL-MCNC: 9.9 MG/DL (ref 8.5–10.5)
CHLORIDE SERPL-SCNC: 98 MMOL/L (ref 96–112)
CO2 SERPL-SCNC: 32 MMOL/L (ref 20–33)
CREAT SERPL-MCNC: 0.65 MG/DL (ref 0.5–1.4)
GFR SERPLBLD CREATININE-BSD FMLA CKD-EPI: 84 ML/MIN/1.73 M 2
GLOBULIN SER CALC-MCNC: 2.4 G/DL (ref 1.9–3.5)
GLUCOSE SERPL-MCNC: 100 MG/DL (ref 65–99)
MAGNESIUM SERPL-MCNC: 1.8 MG/DL (ref 1.5–2.5)
PHOSPHATE SERPL-MCNC: 4.1 MG/DL (ref 2.5–4.5)
POTASSIUM SERPL-SCNC: 4.1 MMOL/L (ref 3.6–5.5)
PROT SERPL-MCNC: 6.4 G/DL (ref 6–8.2)
SODIUM SERPL-SCNC: 141 MMOL/L (ref 135–145)

## 2024-09-06 PROCEDURE — 80053 COMPREHEN METABOLIC PANEL: CPT

## 2024-09-06 PROCEDURE — 84100 ASSAY OF PHOSPHORUS: CPT

## 2024-09-06 PROCEDURE — 83735 ASSAY OF MAGNESIUM: CPT

## 2024-10-03 ENCOUNTER — HOSPITAL ENCOUNTER (OUTPATIENT)
Dept: RADIOLOGY | Facility: MEDICAL CENTER | Age: 89
End: 2024-10-03
Attending: INTERNAL MEDICINE
Payer: MEDICARE

## 2024-10-03 DIAGNOSIS — Z12.31 VISIT FOR SCREENING MAMMOGRAM: ICD-10-CM

## 2024-10-03 PROCEDURE — 77067 SCR MAMMO BI INCL CAD: CPT

## 2024-10-31 ENCOUNTER — HOSPITAL ENCOUNTER (OUTPATIENT)
Dept: LAB | Facility: MEDICAL CENTER | Age: 89
End: 2024-10-31
Payer: MEDICARE

## 2024-10-31 DIAGNOSIS — E03.9 HYPOTHYROIDISM, UNSPECIFIED TYPE: ICD-10-CM

## 2024-10-31 PROCEDURE — 84443 ASSAY THYROID STIM HORMONE: CPT

## 2024-10-31 PROCEDURE — 36415 COLL VENOUS BLD VENIPUNCTURE: CPT

## 2024-11-01 LAB — TSH SERPL-ACNC: 5.73 UIU/ML (ref 0.35–5.5)

## 2024-11-11 SDOH — HEALTH STABILITY: PHYSICAL HEALTH: ON AVERAGE, HOW MANY DAYS PER WEEK DO YOU ENGAGE IN MODERATE TO STRENUOUS EXERCISE (LIKE A BRISK WALK)?: 3 DAYS

## 2024-11-11 SDOH — ECONOMIC STABILITY: FOOD INSECURITY: WITHIN THE PAST 12 MONTHS, THE FOOD YOU BOUGHT JUST DIDN'T LAST AND YOU DIDN'T HAVE MONEY TO GET MORE.: NEVER TRUE

## 2024-11-11 SDOH — HEALTH STABILITY: PHYSICAL HEALTH: ON AVERAGE, HOW MANY MINUTES DO YOU ENGAGE IN EXERCISE AT THIS LEVEL?: 20 MIN

## 2024-11-11 SDOH — ECONOMIC STABILITY: FOOD INSECURITY: WITHIN THE PAST 12 MONTHS, YOU WORRIED THAT YOUR FOOD WOULD RUN OUT BEFORE YOU GOT MONEY TO BUY MORE.: NEVER TRUE

## 2024-11-11 SDOH — ECONOMIC STABILITY: INCOME INSECURITY: HOW HARD IS IT FOR YOU TO PAY FOR THE VERY BASICS LIKE FOOD, HOUSING, MEDICAL CARE, AND HEATING?: NOT HARD AT ALL

## 2024-11-11 SDOH — ECONOMIC STABILITY: INCOME INSECURITY: IN THE LAST 12 MONTHS, WAS THERE A TIME WHEN YOU WERE NOT ABLE TO PAY THE MORTGAGE OR RENT ON TIME?: NO

## 2024-11-11 ASSESSMENT — SOCIAL DETERMINANTS OF HEALTH (SDOH)
HOW OFTEN DO YOU GET TOGETHER WITH FRIENDS OR RELATIVES?: THREE TIMES A WEEK
HOW OFTEN DO YOU ATTEND CHURCH OR RELIGIOUS SERVICES?: PATIENT DECLINED
HOW OFTEN DO YOU HAVE A DRINK CONTAINING ALCOHOL: MONTHLY OR LESS
HOW MANY DRINKS CONTAINING ALCOHOL DO YOU HAVE ON A TYPICAL DAY WHEN YOU ARE DRINKING: PATIENT DECLINED
HOW OFTEN DO YOU HAVE SIX OR MORE DRINKS ON ONE OCCASION: NEVER
HOW OFTEN DO YOU ATTENT MEETINGS OF THE CLUB OR ORGANIZATION YOU BELONG TO?: MORE THAN 4 TIMES PER YEAR
IN A TYPICAL WEEK, HOW MANY TIMES DO YOU TALK ON THE PHONE WITH FAMILY, FRIENDS, OR NEIGHBORS?: MORE THAN THREE TIMES A WEEK
HOW OFTEN DO YOU GET TOGETHER WITH FRIENDS OR RELATIVES?: THREE TIMES A WEEK
IN THE PAST 12 MONTHS, HAS THE ELECTRIC, GAS, OIL, OR WATER COMPANY THREATENED TO SHUT OFF SERVICE IN YOUR HOME?: NO
WITHIN THE PAST 12 MONTHS, YOU WORRIED THAT YOUR FOOD WOULD RUN OUT BEFORE YOU GOT THE MONEY TO BUY MORE: NEVER TRUE
HOW OFTEN DO YOU ATTEND CHURCH OR RELIGIOUS SERVICES?: PATIENT DECLINED
HOW HARD IS IT FOR YOU TO PAY FOR THE VERY BASICS LIKE FOOD, HOUSING, MEDICAL CARE, AND HEATING?: NOT HARD AT ALL
IN A TYPICAL WEEK, HOW MANY TIMES DO YOU TALK ON THE PHONE WITH FAMILY, FRIENDS, OR NEIGHBORS?: MORE THAN THREE TIMES A WEEK
DO YOU BELONG TO ANY CLUBS OR ORGANIZATIONS SUCH AS CHURCH GROUPS UNIONS, FRATERNAL OR ATHLETIC GROUPS, OR SCHOOL GROUPS?: YES
HOW OFTEN DO YOU ATTENT MEETINGS OF THE CLUB OR ORGANIZATION YOU BELONG TO?: MORE THAN 4 TIMES PER YEAR
DO YOU BELONG TO ANY CLUBS OR ORGANIZATIONS SUCH AS CHURCH GROUPS UNIONS, FRATERNAL OR ATHLETIC GROUPS, OR SCHOOL GROUPS?: YES

## 2024-11-11 ASSESSMENT — LIFESTYLE VARIABLES
HOW MANY STANDARD DRINKS CONTAINING ALCOHOL DO YOU HAVE ON A TYPICAL DAY: PATIENT DECLINED
SKIP TO QUESTIONS 9-10: 0
HOW OFTEN DO YOU HAVE A DRINK CONTAINING ALCOHOL: MONTHLY OR LESS
AUDIT-C TOTAL SCORE: -1
HOW OFTEN DO YOU HAVE SIX OR MORE DRINKS ON ONE OCCASION: NEVER

## 2024-11-14 ENCOUNTER — OFFICE VISIT (OUTPATIENT)
Dept: MEDICAL GROUP | Facility: PHYSICIAN GROUP | Age: 89
End: 2024-11-14
Payer: MEDICARE

## 2024-11-14 VITALS
DIASTOLIC BLOOD PRESSURE: 64 MMHG | OXYGEN SATURATION: 93 % | HEIGHT: 59 IN | WEIGHT: 164 LBS | BODY MASS INDEX: 33.06 KG/M2 | SYSTOLIC BLOOD PRESSURE: 136 MMHG | HEART RATE: 72 BPM | TEMPERATURE: 96.8 F | RESPIRATION RATE: 18 BRPM

## 2024-11-14 DIAGNOSIS — Z85.3 HISTORY OF BREAST CANCER: ICD-10-CM

## 2024-11-14 DIAGNOSIS — M85.89 OSTEOPENIA OF MULTIPLE SITES: ICD-10-CM

## 2024-11-14 DIAGNOSIS — I10 ESSENTIAL HYPERTENSION: ICD-10-CM

## 2024-11-14 DIAGNOSIS — R20.2 PARESTHESIAS: ICD-10-CM

## 2024-11-14 DIAGNOSIS — Z00.00 MEDICARE ANNUAL WELLNESS VISIT, SUBSEQUENT: Primary | ICD-10-CM

## 2024-11-14 DIAGNOSIS — E03.9 HYPOTHYROIDISM, UNSPECIFIED TYPE: ICD-10-CM

## 2024-11-14 DIAGNOSIS — E78.2 MIXED HYPERLIPIDEMIA: ICD-10-CM

## 2024-11-14 PROCEDURE — 3078F DIAST BP <80 MM HG: CPT

## 2024-11-14 PROCEDURE — 3075F SYST BP GE 130 - 139MM HG: CPT

## 2024-11-14 PROCEDURE — G0439 PPPS, SUBSEQ VISIT: HCPCS

## 2024-11-14 RX ORDER — LEVOTHYROXINE SODIUM 50 UG/1
50 TABLET ORAL
Qty: 100 TABLET | Refills: 1 | Status: SHIPPED | OUTPATIENT
Start: 2024-11-14

## 2024-11-14 ASSESSMENT — PATIENT HEALTH QUESTIONNAIRE - PHQ9: CLINICAL INTERPRETATION OF PHQ2 SCORE: 0

## 2024-11-14 ASSESSMENT — ACTIVITIES OF DAILY LIVING (ADL): BATHING_REQUIRES_ASSISTANCE: 0

## 2024-11-14 ASSESSMENT — ENCOUNTER SYMPTOMS: GENERAL WELL-BEING: GOOD

## 2024-11-14 ASSESSMENT — FIBROSIS 4 INDEX: FIB4 SCORE: 0.8

## 2024-11-14 NOTE — ASSESSMENT & PLAN NOTE
Chronic, continue daily levothyroxine, will increase to 50 mcg daily on an empty stomach, will recheck tsh in 6-8 weeks.

## 2024-11-14 NOTE — PROGRESS NOTES
Chief Complaint   Patient presents with    Annual Wellness Visit     Pt presents today for AW independently. She is feeling well.    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    Son lives close by.    HPI:  Corrinne Alice Carmignani is a 89 y.o. here for Medicare Annual Wellness Visit     Patient Active Problem List    Diagnosis Date Noted    Chronic left-sided low back pain with left-sided sciatica 05/21/2024    Obesity (BMI 30-39.9) 05/21/2024    IgG lambda monoclonal gammopathy 09/12/2023    Hypomagnesemia 08/29/2023    Elevated serum immunoglobulin free light chain level 08/29/2023    Primary osteoarthritis of right knee 06/16/2023    Use of cane as ambulatory aid 06/16/2023    Cerebral atrophy (HCC) 04/14/2023    Elevated vitamin B12 level 04/14/2023    Aortic stenosis, mild 03/10/2023    Paresthesias 03/10/2023    Varicose veins of bilateral lower extremities with other complications 01/20/2023    Aortic atherosclerosis (HCC) 09/23/2022    Sigmoid diverticulosis 09/23/2022    BMI 31.0-31.9,adult 02/03/2022    History of vitamin D deficiency 02/03/2022    Aortic valve sclerosis 02/03/2022    Mild left ventricular hypertrophy 02/03/2022    Osteopenia of multiple sites 04/07/2020    Essential hypertension 10/11/2019    DDD (degenerative disc disease), lumbar 03/09/2018    Edema of both lower extremities due to peripheral venous insufficiency 03/09/2018    Hypothyroidism 02/23/2016    History of breast cancer 01/26/2016    Hyperlipidemia 02/22/2010       Current Outpatient Medications   Medication Sig Dispense Refill    levothyroxine (SYNTHROID) 50 MCG Tab Take 1 Tablet by mouth every morning on an empty stomach. 100 Tablet 1    denosumab (PROLIA) 60 MG/ML Solution Prefilled Syringe injection Inject 60 mg under the skin every 6 months. Indications: Osteoporosis      Cyanocobalamin (VITAMIN B-12 PO) Take  by mouth.      chlorthalidone (HYGROTON) 25 MG Tab Take 1  Tablet by mouth every day. 90 Tablet 3    diclofenac sodium (VOLTAREN) 1 % Gel Apply 4 g topically 4 times a day as needed (joint pain). 300 g 3    simvastatin (ZOCOR) 20 MG Tab Take 1 Tablet by mouth every evening. 90 Tablet 3    gabapentin (NEURONTIN) 100 MG Cap       vitamin E 180 mg (400 units) Cap Take 180 mg by mouth every day.      aspirin (ASA) 81 MG Chew Tab chewable tablet Take 81 mg by mouth every day.      Multiple Vitamin (MULTI-VITAMIN DAILY PO) Take  by mouth.      VITAMIN E PO Take  by mouth.       No current facility-administered medications for this visit.          Current supplements as per medication list.     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     She  reports that she quit smoking about 26 years ago. Her smoking use included cigarettes. She started smoking about 71 years ago. She has a 22.5 pack-year smoking history. She has never used smokeless tobacco. She reports current alcohol use. She reports that she does not use drugs.  Counseling given: Not Answered  Tobacco comments: avoid all tobacco products      ROS:    Gait: Cane  Ostomy: No  Other tubes: No  Amputations: No  Chronic oxygen use: No  Last eye exam: 2023  Wears hearing aids: No   : Denies any urinary leakage during the last 6 months    Screening:  Depression Screening  Little interest or pleasure in doing things?  0 - not at all  Feeling down, depressed , or hopeless? 0 - not at all  Patient Health Questionnaire Score: 0     If depressive symptoms identified deferred to follow up visit unless specifically addressed in assessment and plan.    Interpretation of PHQ-9 Total Score   Score Severity   1-4 No Depression   5-9 Mild Depression   10-14 Moderate Depression   15-19 Moderately Severe Depression   20-27 Severe Depression    Screening for Cognitive Impairment  Do you or any of your friends or family members have  any concern about your memory? No  Three Minute Recall (Leader, Season, Table) 1/3    Miguel clock face with all 12 numbers and set the hands to show 10 minutes after 11.  No    Cognitive concerns identified deferred for follow up unless specifically addressed in assessment and plan.    Fall Risk Assessment  Has the patient had two or more falls in the last year or any fall with injury in the last year?  No    Safety Assessment  Do you always wear your seatbelt?  Yes  Any changes to home needed to function safely? No  Difficulty hearing.  No  Patient counseled about all safety risks that were identified.    Functional Assessment ADLs  Are there any barriers preventing you from cooking for yourself or meeting nutritional needs?  No.    Are there any barriers preventing you from driving safely or obtaining transportation?  No.    Are there any barriers preventing you from using a telephone or calling for help?  No    Are there any barriers preventing you from shopping?  No.    Are there any barriers preventing you from taking care of your own finances?  No    Are there any barriers preventing you from managing your medications?  No    Are there any barriers preventing you from showering, bathing or dressing yourself? No    Are there any barriers preventing you from doing housework or laundry? No  Are there any barriers preventing you from using the toilet?No  Are you currently engaging in any exercise or physical activity?  No.      Self-Assessment of Health  What is your perception of your health? Good    Do you sleep more than six hours a night? No    In the past 7 days, how much did pain keep you from doing your normal work? None    Do you spend quality time with family or friends (virtually or in person)? Yes    Do you usually eat a heart healthy diet that constists of a variety of fruits, vegetables, whole grains and fiber? Yes    Do you eat foods high in fat and/or Fast Food more than three times per week? No     How concerned are you that your medical conditions are not being well managed? Not at all    Are you worried that in the next 2 months, you may not have stable housing that you own, rent, or stay in as part of a household? No      Advance Care Planning  Do you have an Advance Directive, Living Will, Durable Power of , or POLST? Yes      Durable Power of           Health Maintenance Summary            Postponed - COVID-19 Vaccine (6 - 2024-25 season) Postponed until 11/14/2025 09/13/2024  Outside Immunization: COVID-19(MOD) 12yrs \T\ up    12/13/2022  Imm Admin: MODERNA BIVALENT BOOSTER SARS-COV-2 VACCINE (6+)    05/10/2022  Imm Admin: MODERNA SARS-COV-2 VACCINE (12+)    12/03/2021  Imm Admin: MODERNA SARS-COV-2 VACCINE (12+)    03/05/2021  Imm Admin: MODERNA SARS-COV-2 VACCINE (12+)    Only the first 5 history entries have been loaded, but more history exists.              Bone Density Scan (Every 5 Years) Tentatively due on 9/10/2025      09/10/2020  DS-BONE DENSITY STUDY (DEXA)    04/12/2016  DS-BONE DENSITY STUDY (DEXA)    07/11/2013  DS-BONE DENSITY STUDY (DEXA)              Annual Wellness Visit (Yearly) Next due on 11/14/2025 11/14/2024  Level of Service: [place] ANNUAL WELLNESS VISIT-INCLUDES PPPS SUBSEQUE*    05/12/2022  Level of Service: [place] ANNUAL WELLNESS VISIT-INCLUDES PPPS SUBSEQUE*    06/13/2019  Done - Completed by Dr. Isiah Wolff    09/03/2015  Visit Dx: Encounter for Medicare annual wellness exam              IMM DTaP/Tdap/Td Vaccine (3 - Td or Tdap) Next due on 3/14/2034      03/14/2024  Imm Admin: Tdap Vaccine    07/09/2013  Imm Admin: Tdap Vaccine              Pneumococcal Vaccine: 65+ Years (Series Information) Completed      10/11/2019  Imm Admin: Pneumococcal polysaccharide vaccine (PPSV-23)    09/08/2016  Imm Admin: Pneumococcal Conjugate Vaccine (Prevnar/PCV-13)              Zoster (Shingles) Vaccines (Series Information) Completed      11/02/2021  Imm Admin: Zoster  Vaccine Recombinant (RZV) (SHINGRIX)    12/12/2020  Imm Admin: Zoster Vaccine Recombinant (RZV) (SHINGRIX)    09/08/2016  Imm Admin: Zoster Vaccine Live (ZVL) (Zostavax) - HISTORICAL DATA              Influenza Vaccine (Series Information) Completed      09/13/2024  Outside Immunization: Influenza, High Dose    10/12/2023  Imm Admin: Influenza Vaccine Adult HD    09/23/2022  Imm Admin: Influenza Vaccine Adult HD    11/02/2021  Imm Admin: Influenza Vaccine Adult HD    09/24/2020  Imm Admin: Influenza Vaccine Adult HD    Only the first 5 history entries have been loaded, but more history exists.              Hepatitis A Vaccine (Hep A) (Series Information) Aged Out      No completion history exists for this topic.              HPV Vaccines (Series Information) Aged Out      No completion history exists for this topic.              Polio Vaccine (Inactivated Polio) (Series Information) Aged Out      No completion history exists for this topic.              Meningococcal Immunization (Series Information) Aged Out      No completion history exists for this topic.              Discontinued - Mammogram  Discontinued        Frequency changed to Never automatically (Topic No Longer Applies)    10/03/2024  MA-SCREENING MAMMO BILAT W/TOMOSYNTHESIS W/CAD    09/29/2023  MA-SCREENING MAMMO BILAT W/TOMOSYNTHESIS W/CAD    09/27/2022  MA-SCREENING MAMMO BILAT W/TOMOSYNTHESIS W/CAD    09/23/2021  MA-SCREENING MAMMO BILAT W/TOMOSYNTHESIS W/CAD    Only the first 5 history entries have been loaded, but more history exists.              Discontinued - Colorectal Cancer Screening  Ordered on 4/7/2020        Frequency changed to Never automatically (Topic No Longer Applies)    10/02/2013  AMB REFERRAL TO GI FOR COLONOSCOPY              Discontinued - Hepatitis B Vaccine (Hep B)  Discontinued      11/03/2022  Imm Admin: Hepatitis B Vaccine (Adol/Adult)    09/23/2022  Imm Admin: Hepatitis B Vaccine (Adol/Adult)                    Patient  Care Team:  VICENTA Choi as PCP - General (Nurse Practitioner Family)  Ysabel Márquez M.D. as Consulting Physician (Medical Oncology)  Dr. Arvizu (Dentistry)  Rosy Parry M.D. as Attending Team Physician (Surgery)  Grant Womack M.D. as Attending Team Physician (Radiation Oncology)  Conrad Canseco M.D. as Attending Team Physician (Anesthesiology)  Yonathan Garg M.D. as Attending Team Physician (Neurosurgery)  Herber Perez M.D. (Neurology)        Social History     Tobacco Use    Smoking status: Former     Current packs/day: 0.00     Average packs/day: 0.5 packs/day for 45.0 years (22.5 ttl pk-yrs)     Types: Cigarettes     Start date: 1953     Quit date: 1998     Years since quittin.8    Smokeless tobacco: Never    Tobacco comments:     avoid all tobacco products   Vaping Use    Vaping status: Never Used   Substance Use Topics    Alcohol use: Yes     Alcohol/week: 0.0 oz     Comment: 7/year    Drug use: No     Family History   Problem Relation Age of Onset    Heart Disease Mother     Other Father     Drug abuse Son     Cancer Neg Hx      She  has a past medical history of Arthritis, Back pain, Cancer (HCC), Cataract, DDD (degenerative disc disease), lumbar (3/9/2018), Dental disorder, Healthcare maintenance (3/9/2018), Hyperlipidemia, Hypothyroid, Lumbar spondylosis, Peripheral edema (3/9/2018), Prediabetes (2020), and Snoring.   Past Surgical History:   Procedure Laterality Date    MASTECTOMY Right 2016    Procedure: MASTECTOMY partial  ;  Surgeon: Rosy Parry M.D.;  Location: SURGERY SAME DAY [facility] ORS;  Service:     NODE BIOPSY SENTINEL Right 2016    Procedure: NODE BIOPSY SENTINEL Lymph  ;  Surgeon: Rosy Parry M.D.;  Location: SURGERY SAME DAY [facility] ORS;  Service:     OOPHORECTOMY  1977    cyst    ABDOMINAL HYSTERECTOMY TOTAL  1972    non-cancer    TONSILLECTOMY  89    OTHER  , 2010    cataract IOLI genna     "US-NEEDLE CORE BX-BREAST PANEL         Exam:   /64 (BP Location: Right arm, Patient Position: Sitting, BP Cuff Size: Adult)   Pulse 72   Temp 36 °C (96.8 °F) (Temporal)   Resp 18   Ht 1.499 m (4' 11\")   Wt 74.4 kg (164 lb)   SpO2 93%  Body mass index is 33.12 kg/m².    Hearing good.    Dentition good  Alert, oriented in no acute distress.  Eye contact is good, speech goal directed, affect calm    Assessment and Plan. The following treatment and monitoring plan is recommended:    Problem List Items Addressed This Visit          Family Medicine Problems    Essential hypertension     Chronic, stable. Bp in clinic 136/64. Continue chlorthalidone 25 mg daily            Other    Hyperlipidemia     Chronic, stable. Continue simvastatin 20 mg nightly, aspirin 81 mg daily         History of breast cancer     Stable. Mammogram 10/24 R1         Hypothyroidism     Chronic, continue daily levothyroxine, will increase to 50 mcg daily on an empty stomach, will recheck tsh in 6-8 weeks.         Relevant Medications    levothyroxine (SYNTHROID) 50 MCG Tab    Other Relevant Orders    TSH    Osteopenia of multiple sites     Chronic, stable. Continue prolia 60 mg every 6 months.          Paresthesias     Chronic, stable. Continue gabapentin as needed           Services suggested: No services needed at this time  Health Care Screening: Age-appropriate preventive services recommended by USPTF and ACIP covered by Medicare were discussed today. Services ordered if indicated and agreed upon by the patient.  Referrals offered: Community-based lifestyle interventions to reduce health risks and promote self-management and wellness, fall prevention, nutrition, physical activity, tobacco-use cessation, weight loss, and mental health services as per orders if indicated.    Discussion today about general wellness and lifestyle habits:    Prevent falls and reduce trip hazards; Cautioned about securing or removing rugs.  Have a working " fire alarm and carbon monoxide detector;   Engage in regular physical activity and social activities     Follow-up: Return in about 6 months (around 5/14/2025), or if symptoms worsen or fail to improve.

## 2025-02-20 ENCOUNTER — HOSPITAL ENCOUNTER (OUTPATIENT)
Dept: LAB | Facility: MEDICAL CENTER | Age: OVER 89
End: 2025-02-20
Payer: MEDICARE

## 2025-02-20 DIAGNOSIS — E03.9 HYPOTHYROIDISM, UNSPECIFIED TYPE: ICD-10-CM

## 2025-02-20 PROCEDURE — 36415 COLL VENOUS BLD VENIPUNCTURE: CPT

## 2025-02-20 PROCEDURE — 84443 ASSAY THYROID STIM HORMONE: CPT

## 2025-02-21 ENCOUNTER — RESULTS FOLLOW-UP (OUTPATIENT)
Dept: MEDICAL GROUP | Facility: PHYSICIAN GROUP | Age: OVER 89
End: 2025-02-21

## 2025-02-21 LAB — TSH SERPL-ACNC: 3.66 UIU/ML (ref 0.35–5.5)

## 2025-03-24 DIAGNOSIS — E78.2 MIXED HYPERLIPIDEMIA: ICD-10-CM

## 2025-03-25 NOTE — TELEPHONE ENCOUNTER
Received request via: Pharmacy    Was the patient seen in the last year in this department? Yes    Does the patient have an active prescription (recently filled or refills available) for medication(s) requested? No    Pharmacy Name:      [facility] - [place], [place] - [address]  [address] [address]  Phone: [phone] Fax: [phone]       Does the patient have jail Plus and need 100-day supply? (This applies to ALL medications) Patient does not have SCP

## 2025-03-26 RX ORDER — SIMVASTATIN 20 MG
20 TABLET ORAL EVERY EVENING
Qty: 100 TABLET | Refills: 3 | Status: SHIPPED | OUTPATIENT
Start: 2025-03-26

## 2025-03-31 DIAGNOSIS — E03.9 HYPOTHYROIDISM, UNSPECIFIED TYPE: ICD-10-CM

## 2025-04-01 RX ORDER — LEVOTHYROXINE SODIUM 50 UG/1
50 TABLET ORAL
Qty: 100 TABLET | Refills: 3 | Status: SHIPPED | OUTPATIENT
Start: 2025-04-01

## 2025-04-01 NOTE — TELEPHONE ENCOUNTER
Received request via: Pharmacy    Was the patient seen in the last year in this department? Yes    Does the patient have an active prescription (recently filled or refills available) for medication(s) requested? No    Pharmacy Name:      [facility] - [place], [place] - [address]  [address] [address]  Phone: [phone] Fax: [phone]       Does the patient have FPC Plus and need 100-day supply? (This applies to ALL medications) Patient does not have SCP

## 2025-05-08 ENCOUNTER — OFFICE VISIT (OUTPATIENT)
Dept: MEDICAL GROUP | Facility: PHYSICIAN GROUP | Age: OVER 89
End: 2025-05-08
Payer: MEDICARE

## 2025-05-08 VITALS
OXYGEN SATURATION: 90 % | TEMPERATURE: 97 F | SYSTOLIC BLOOD PRESSURE: 130 MMHG | HEART RATE: 76 BPM | DIASTOLIC BLOOD PRESSURE: 62 MMHG | WEIGHT: 162 LBS | RESPIRATION RATE: 20 BRPM | HEIGHT: 59 IN | BODY MASS INDEX: 32.66 KG/M2

## 2025-05-08 DIAGNOSIS — R20.2 PARESTHESIAS: ICD-10-CM

## 2025-05-08 DIAGNOSIS — I10 ESSENTIAL HYPERTENSION: ICD-10-CM

## 2025-05-08 DIAGNOSIS — E78.2 MIXED HYPERLIPIDEMIA: ICD-10-CM

## 2025-05-08 DIAGNOSIS — E03.9 HYPOTHYROIDISM, UNSPECIFIED TYPE: ICD-10-CM

## 2025-05-08 DIAGNOSIS — I35.0 AORTIC STENOSIS, MILD: ICD-10-CM

## 2025-05-08 DIAGNOSIS — M85.89 OSTEOPENIA OF MULTIPLE SITES: ICD-10-CM

## 2025-05-08 PROCEDURE — 3078F DIAST BP <80 MM HG: CPT

## 2025-05-08 PROCEDURE — 99214 OFFICE O/P EST MOD 30 MIN: CPT

## 2025-05-08 PROCEDURE — 3075F SYST BP GE 130 - 139MM HG: CPT

## 2025-05-08 RX ORDER — CHLORTHALIDONE 25 MG/1
25 TABLET ORAL DAILY
Qty: 90 TABLET | Refills: 3 | Status: SHIPPED | OUTPATIENT
Start: 2025-05-08

## 2025-05-08 RX ORDER — MAGNESIUM OXIDE 400 MG/1
400 TABLET ORAL DAILY
COMMUNITY

## 2025-05-08 ASSESSMENT — ENCOUNTER SYMPTOMS
FALLS: 0
SHORTNESS OF BREATH: 0
PALPITATIONS: 0
BACK PAIN: 1
COUGH: 0
ORTHOPNEA: 0
WEAKNESS: 0

## 2025-05-08 ASSESSMENT — PATIENT HEALTH QUESTIONNAIRE - PHQ9: CLINICAL INTERPRETATION OF PHQ2 SCORE: 0

## 2025-05-08 ASSESSMENT — FIBROSIS 4 INDEX: FIB4 SCORE: 0.8

## 2025-05-08 NOTE — PROGRESS NOTES
CC: 6 month follow up       HPI: Corrinne is a very pleasant 89 year old female who presents today for a 6 month follow up. She has been doing well since her last visit. She lives alone with family nearby and stays very busy volunteering for several clubs and organizations in town. She has travel plans later this year including a trip to [place] next month and cruise to [place] in the fall. She reports over the last 6 months waking once at night to use the bathroom but easily goes back to sleep and is not too bothersome.     Current Outpatient Medications   Medication Sig Dispense Refill    chlorthalidone (HYGROTON) 25 MG Tab Take 1 Tablet by mouth every day. 90 Tablet 3    Calcium-Vitamin D (CALTRATE 600 PLUS-VIT D PO) Take  by mouth.      magnesium oxide (MAG-OX) 400 MG Tab tablet Take 400 mg by mouth every day.      levothyroxine (SYNTHROID) 50 MCG Tab TAKE 1 TABLET BY MOUTH IN THE  MORNING ON AN EMPTY STOMACH 100 Tablet 3    simvastatin (ZOCOR) 20 MG Tab TAKE 1 TABLET BY MOUTH IN THE  EVENING 100 Tablet 3    denosumab (PROLIA) 60 MG/ML Solution Prefilled Syringe injection Inject 60 mg under the skin every 6 months. Indications: Osteoporosis      gabapentin (NEURONTIN) 100 MG Cap       aspirin (ASA) 81 MG Chew Tab chewable tablet Take 81 mg by mouth every day.      Cyanocobalamin (VITAMIN B-12 PO) Take  by mouth.      diclofenac sodium (VOLTAREN) 1 % Gel Apply 4 g topically 4 times a day as needed (joint pain). 300 g 3    vitamin E 180 mg (400 units) Cap Take 180 mg by mouth every day.      Multiple Vitamin (MULTI-VITAMIN DAILY PO) Take  by mouth.       No current facility-administered medications for this visit.       Allergies as of 05/08/2025 - Reviewed 05/08/2025   Allergen Reaction Noted    Fosamax [alendronate sodium]  04/07/2020        Review of Systems   Respiratory:  Negative for cough and shortness of breath.    Cardiovascular:  Negative for chest pain, palpitations and orthopnea.   Genitourinary:   "Negative for dysuria, frequency, hematuria and urgency.        Nocturia   Musculoskeletal:  Positive for back pain. Negative for falls.   Skin:         Weak nails   Neurological:  Negative for weakness.     All systems negative except as addressed in HPI, assessment, and plan.     /62 (BP Location: Left arm, Patient Position: Sitting, BP Cuff Size: Adult)   Pulse 76   Temp 36.1 °C (97 °F) (Temporal)   Resp 20   Ht 1.499 m (4' 11\")   Wt 73.5 kg (162 lb)   SpO2 90%   BMI 32.72 kg/m²     Physical Exam  Vitals reviewed.   Constitutional:       General: She is not in acute distress.     Appearance: Normal appearance. She is not ill-appearing or toxic-appearing.   HENT:      Head: Normocephalic and atraumatic.      Mouth/Throat:      Mouth: Mucous membranes are moist.   Eyes:      Extraocular Movements: Extraocular movements intact.      Pupils: Pupils are equal, round, and reactive to light.   Cardiovascular:      Rate and Rhythm: Normal rate and regular rhythm.      Heart sounds: Murmur (3/6 right sternal boarder) heard.   Pulmonary:      Effort: Pulmonary effort is normal. No respiratory distress.      Breath sounds: Normal breath sounds. No wheezing.   Musculoskeletal:      Cervical back: Normal range of motion and neck supple.      Comments: Wearing bilateral compression stockings   Skin:     General: Skin is warm and dry.      Capillary Refill: Capillary refill takes less than 2 seconds.      Comments: onychoschizia   Neurological:      General: No focal deficit present.      Mental Status: She is alert and oriented to person, place, and time.   Psychiatric:         Mood and Affect: Mood normal.         Behavior: Behavior normal.       Assessment and Plan:  89 y.o. female with the following issues.    1. Essential hypertension  - chronic, stable  - BP today 130/62  - continue chlorthalidone 25mg daily    - chlorthalidone (HYGROTON) 25 MG Tab; Take 1 Tablet by mouth every day.  Dispense: 90 Tablet; " Refill: 3    2. Hypothyroidism, unspecified type  - chronic, stable  - TSH 2/20/25 3.66  - continue levothyroxine 50mcg daily    3. Mixed hyperlipidemia  - chronic, stable  - continue simvastatin 20mg daily    4. Paresthesias  - chronic, stable  - continue gabapentin 100mg 1-3 times daily PRN    5. Osteopenia of multiple sites  - chronic, stable  - continue denosumab 60mg/ml every 6 months  - due for 5 year bone density scan in September, order placed    - DS-BONE DENSITY STUDY (DEXA); Future    6. Aortic stenosis, mild  - chronic, stable. Patient denies SOB, fatigue, palpitations, CP, dizziness, lightheadedness  - murmur on exam 3/6  - last echo 3/2/22 with mild AS    - EC-ECHOCARDIOGRAM COMPLETE W/O CONT; Future        Return in about 6 months (around 11/8/2025), or if symptoms worsen or fail to improve.

## 2025-05-18 DIAGNOSIS — M25.561 ACUTE PAIN OF RIGHT KNEE: ICD-10-CM

## 2025-05-18 DIAGNOSIS — M17.11 PRIMARY OSTEOARTHRITIS OF RIGHT KNEE: ICD-10-CM

## 2025-05-19 NOTE — TELEPHONE ENCOUNTER
Received request via: Pharmacy    Was the patient seen in the last year in this department? Yes    Does the patient have an active prescription (recently filled or refills available) for medication(s) requested? No    Pharmacy Name: [facility]    Does the patient have skilled nursing Plus and need 100-day supply? (This applies to ALL medications) Patient does not have SCP

## 2025-05-21 ENCOUNTER — OFFICE VISIT (OUTPATIENT)
Dept: URGENT CARE | Facility: PHYSICIAN GROUP | Age: OVER 89
End: 2025-05-21
Payer: MEDICARE

## 2025-05-21 VITALS
SYSTOLIC BLOOD PRESSURE: 128 MMHG | BODY MASS INDEX: 30.36 KG/M2 | WEIGHT: 165 LBS | HEART RATE: 78 BPM | OXYGEN SATURATION: 97 % | HEIGHT: 62 IN | TEMPERATURE: 98 F | RESPIRATION RATE: 18 BRPM | DIASTOLIC BLOOD PRESSURE: 68 MMHG

## 2025-05-21 DIAGNOSIS — J01.00 ACUTE NON-RECURRENT MAXILLARY SINUSITIS: Primary | ICD-10-CM

## 2025-05-21 PROCEDURE — 3074F SYST BP LT 130 MM HG: CPT | Performed by: STUDENT IN AN ORGANIZED HEALTH CARE EDUCATION/TRAINING PROGRAM

## 2025-05-21 PROCEDURE — 3078F DIAST BP <80 MM HG: CPT | Performed by: STUDENT IN AN ORGANIZED HEALTH CARE EDUCATION/TRAINING PROGRAM

## 2025-05-21 PROCEDURE — 99213 OFFICE O/P EST LOW 20 MIN: CPT | Performed by: STUDENT IN AN ORGANIZED HEALTH CARE EDUCATION/TRAINING PROGRAM

## 2025-05-21 ASSESSMENT — FIBROSIS 4 INDEX: FIB4 SCORE: 0.8

## 2025-05-21 ASSESSMENT — ENCOUNTER SYMPTOMS
COUGH: 1
SINUS PAIN: 1

## 2025-05-21 NOTE — PROGRESS NOTES
Subjective:   Corrinne Alice Carmignani is a 89 y.o. female who presents for Seasonal Allergies (Runny nose and Pressure in nose area. )      HPI:  89-year-old female presents with congestion and sinus pressure for the past 2 weeks initially started as seasonal allergies which is similar to what she had last year As well.  Lots of runny nose lots of congestion and mild cough.  Cough is waking her up at night.  But over the past week has gotten significantly worse with increased nasal pressure and discomfort.  The cough is still ongoing but has small amount of production at this time.  She is currently taking Mucinex as well as cough syrup with mild help.  She is also taking a daily antihistamine and Flonase with some mild improvement of her symptoms though the pressure is still present.       Review of Systems   HENT:  Positive for congestion and sinus pain.    Respiratory:  Positive for cough.        Medications:    aspirin Chew  CALTRATE 600 PLUS-VIT D PO  chlorthalidone Tabs  diclofenac sodium Gel  gabapentin Caps  levothyroxine Tabs  magnesium oxide Tabs  MULTI-VITAMIN DAILY PO  Prolia Sosy  simvastatin Tabs  VITAMIN B-12 PO  vitamin E Caps    Allergies: Fosamax [alendronate sodium]    Problem List: Corrinne Alice Carmignani does not have any pertinent problems on file.    Surgical History:  Past Surgical History:   Procedure Laterality Date    MASTECTOMY Right 1/26/2016    Procedure: MASTECTOMY partial  ;  Surgeon: Rosy Parry M.D.;  Location: SURGERY SAME DAY [facility]S;  Service:     NODE BIOPSY SENTINEL Right 1/26/2016    Procedure: NODE BIOPSY SENTINEL Lymph  ;  Surgeon: Rosy Parry M.D.;  Location: SURGERY SAME DAY [facility]S;  Service:     OOPHORECTOMY  04/18/1977    cyst    ABDOMINAL HYSTERECTOMY TOTAL  11/05/1972    non-cancer    TONSILLECTOMY  1946    OTHER  2009, 2010    cataract IOLI genna    US-NEEDLE CORE BX-BREAST PANEL         Past Social Hx: Corrinne Alice Carmignani  reports that she  "quit smoking about 27 years ago. Her smoking use included cigarettes. She started smoking about 72 years ago. She has a 22.5 pack-year smoking history. She has never used smokeless tobacco. She reports current alcohol use. She reports that she does not use drugs.     Past Family Hx:  Corrinne Alice Carmignani family history includes Drug abuse in her son; Heart Disease in her mother; Other in her father.     Problem list, medications, and allergies reviewed by myself today in Epic.     Objective:     /68 (BP Location: Left arm, Patient Position: Sitting, BP Cuff Size: Adult)   Pulse 78   Temp 36.7 °C (98 °F) (Temporal)   Resp 18   Ht 1.575 m (5' 2\")   Wt 74.8 kg (165 lb)   SpO2 97%   BMI 30.18 kg/m²     Physical Exam  Constitutional:       Appearance: Normal appearance.   HENT:      Head: Normocephalic and atraumatic.      Right Ear: Tympanic membrane normal.      Left Ear: Tympanic membrane normal.      Nose: Congestion and rhinorrhea present.      Comments: Turbinates bilateral swollen, maxillary sinus tenderness     Mouth/Throat:      Mouth: Mucous membranes are moist.      Pharynx: Oropharynx is clear. No posterior oropharyngeal erythema.   Eyes:      Extraocular Movements: Extraocular movements intact.      Conjunctiva/sclera: Conjunctivae normal.   Cardiovascular:      Rate and Rhythm: Normal rate and regular rhythm.      Pulses: Normal pulses.      Heart sounds: Normal heart sounds.   Pulmonary:      Effort: Pulmonary effort is normal.      Breath sounds: Normal breath sounds.   Neurological:      Mental Status: She is alert.         Assessment/Plan:     Diagnosis and associated orders:     1. Acute non-recurrent maxillary sinusitis  amoxicillin-clavulanate (AUGMENTIN) 875-125 MG Tab    DISCONTINUED: amoxicillin-clavulanate (AUGMENTIN) 875-125 MG Tab         Comments/MDM:     1. Acute non-recurrent maxillary sinusitis (Primary)  Contingent antibiotic prescription given to patient to fill upon " meeting criteria of guidelines discussed.  We discussed continuing with the decongestants and allergy medicine for the next 24 to 48 hours if no improvement or any worsening may start antibiotics    Symptomatic Management  - Nasal decongestants (e.g., pseudoephedrine 30 mg every 4-6 hours as tolerated) for congestion.  - Saline nasal irrigation: Recommend using saline spray or neti pot to flush out nasal passages.  - Intranasal corticosteroids (e.g., Fluticasone nasal spray) to reduce inflammation, especially if there is a history of allergies.  - Pain relief: Recommend acetaminophen or ibuprofen for pain and fever.  -Warm humidified air or steam inhalation for relief for congestion- Use of once daily antihistamine.  -Follow up in 7-10 days or sooner if worsening    Education     - Advise the patient on proper use of nasal sprays and decongestants to avoid overuse.     - Discuss the importance of completing the full course of antibiotics to prevent resistance and relapse.     - Encourage adequate hydration and rest.     - Advise avoiding triggers such as smoking or environmental irritants.    Patient educated on on signs of complications (e.g., fever, worsening headache, facial swelling, vision changes) and when to seek further medical attention.    - amoxicillin-clavulanate (AUGMENTIN) 875-125 MG Tab; Take 1 Tablet by mouth 2 times a day for 5 days.  Dispense: 10 Tablet; Refill: 0           Differential diagnosis, natural history, supportive care, and indications for immediate follow-up discussed.    Advised the patient to follow-up with the primary care physician for recheck, reevaluation, and consideration of further management.    Please note that this dictation was created using voice recognition software. I have made a reasonable attempt to correct obvious errors, but I expect that there are errors of grammar and possibly content that I did not discover before finalizing the note.    Kye Rivera,  M.D.

## 2025-06-18 ENCOUNTER — OFFICE VISIT (OUTPATIENT)
Dept: URGENT CARE | Facility: PHYSICIAN GROUP | Age: OVER 89
End: 2025-06-18
Payer: MEDICARE

## 2025-06-18 ENCOUNTER — RESULTS FOLLOW-UP (OUTPATIENT)
Dept: URGENT CARE | Facility: PHYSICIAN GROUP | Age: OVER 89
End: 2025-06-18

## 2025-06-18 VITALS
RESPIRATION RATE: 16 BRPM | HEART RATE: 102 BPM | DIASTOLIC BLOOD PRESSURE: 78 MMHG | HEIGHT: 62 IN | SYSTOLIC BLOOD PRESSURE: 136 MMHG | TEMPERATURE: 99.9 F | WEIGHT: 165 LBS | OXYGEN SATURATION: 91 % | BODY MASS INDEX: 30.36 KG/M2

## 2025-06-18 DIAGNOSIS — J06.9 URI WITH COUGH AND CONGESTION: Primary | ICD-10-CM

## 2025-06-18 DIAGNOSIS — U07.1 COVID: Primary | ICD-10-CM

## 2025-06-18 LAB
FLUAV RNA SPEC QL NAA+PROBE: NEGATIVE
FLUBV RNA SPEC QL NAA+PROBE: NEGATIVE
RSV RNA SPEC QL NAA+PROBE: NEGATIVE
SARS-COV-2 RNA RESP QL NAA+PROBE: POSITIVE

## 2025-06-18 PROCEDURE — 99213 OFFICE O/P EST LOW 20 MIN: CPT

## 2025-06-18 PROCEDURE — 3075F SYST BP GE 130 - 139MM HG: CPT

## 2025-06-18 PROCEDURE — 0241U POCT CEPHEID COV-2, FLU A/B, RSV - PCR: CPT

## 2025-06-18 PROCEDURE — 3078F DIAST BP <80 MM HG: CPT

## 2025-06-18 RX ORDER — BENZONATATE 100 MG/1
100 CAPSULE ORAL 3 TIMES DAILY PRN
Qty: 60 CAPSULE | Refills: 0 | Status: SHIPPED | OUTPATIENT
Start: 2025-06-18

## 2025-06-18 ASSESSMENT — FIBROSIS 4 INDEX: FIB4 SCORE: 0.8

## 2025-06-18 NOTE — RESULT ENCOUNTER NOTE
Gregg Matute,  I just reviewed the results of your viral swab, and it does look like you came back positive for COVID.  You are still in the window to take the antiviral medication, called Paxlovid.  I did send a prescription for this medication along with instructions to the [facility] next to the urgent care.  Please continue the plan of care that was discussed during visit and reach out for any further questions or concerns.  Take care and feel better!    Obdulio BAKER.

## 2025-06-18 NOTE — PROGRESS NOTES
"Subjective:   Corrinne Alice Carmignani is a 89 y.o. female who presents for URI (Saturday, cough getting worse, runny nose, chest congestion )    Patient presents to the clinic for complaints of cough and runny nose x 5 days.  Cough is been productive with chest congestion, worsening over the last few days.  Recently went to a cruise a week ago.   Has taken OTC cough/cold meds.   Patient denies chest pain, SOB, dizziness/lightheadedness/vertigo, nausea/vomiting/diarrhea, difficulty breathing or swallowing, palpitations or racing heart rate, wheezing, fever, chills, or body aches.       URI         ROS  Refer to [facility] for additional details.    During this visit, appropriate PPE was worn, and hand hygiene was performed.    PMH:  has a past medical history of Arthritis, Back pain, Cancer (HCC), Cataract, DDD (degenerative disc disease), lumbar (3/9/2018), Dental disorder, Healthcare maintenance (3/9/2018), Hyperlipidemia, Hypothyroid, Lumbar spondylosis, Peripheral edema (3/9/2018), Prediabetes (9/29/2020), and Snoring.    MEDS: Current Medications[1]    ALLERGIES: Allergies[2]  SURGHX: Past Surgical History[3]  SOCHX:  reports that she quit smoking about 27 years ago. Her smoking use included cigarettes. She started smoking about 72 years ago. She has a 22.5 pack-year smoking history. She has never used smokeless tobacco. She reports current alcohol use. She reports that she does not use drugs.    FH: Per [facility] as applicable/pertinent.    Medications, Allergies, and current problem list reviewed today in Epic.     Objective:     /78 (BP Location: Right arm, Patient Position: Sitting, BP Cuff Size: Adult)   Pulse (!) 102   Temp 37.7 °C (99.9 °F) (Temporal)   Resp 16   Ht 1.575 m (5' 2\")   Wt 74.8 kg (165 lb)   SpO2 91%     Physical Exam  Constitutional:       Appearance: She is ill-appearing.   HENT:      Head: Normocephalic.      Right Ear: Tympanic membrane, ear canal and external ear normal.      Left Ear: " Tympanic membrane, ear canal and external ear normal.      Nose: Congestion present. No rhinorrhea.      Mouth/Throat:      Mouth: Mucous membranes are dry.      Pharynx: Oropharynx is clear.   Eyes:      Extraocular Movements: Extraocular movements intact.      Conjunctiva/sclera: Conjunctivae normal.   Cardiovascular:      Rate and Rhythm: Regular rhythm. Tachycardia present.      Pulses: Normal pulses.      Heart sounds: Normal heart sounds.   Pulmonary:      Effort: Pulmonary effort is normal.      Breath sounds: Normal breath sounds.      Comments: Coughing in clinic.   Abdominal:      General: Abdomen is flat.   Musculoskeletal:         General: Normal range of motion.      Cervical back: No rigidity.   Lymphadenopathy:      Cervical: No cervical adenopathy.   Skin:     General: Skin is warm and dry.      Capillary Refill: Capillary refill takes less than 2 seconds.      Coloration: Skin is not jaundiced or pale.   Neurological:      General: No focal deficit present.      Mental Status: She is alert and oriented to person, place, and time.   Psychiatric:         Mood and Affect: Mood normal.         Behavior: Behavior normal.         Thought Content: Thought content normal.         Judgment: Judgment normal.         Assessment/Plan:     Diagnosis and associated orders:     1. URI with cough and congestion  - POCT CoV-2, Flu A/B, RSV by PCR  - benzonatate (TESSALON) 100 MG Cap; Take 1 Capsule by mouth 3 times a day as needed for Cough.  Dispense: 60 Capsule; Refill: 0     Comments/MDM:     Discussed that likely etiology of the patient's symptoms is URI w/ cough. POC Viral swab collected in clinic. Discussed that they will receive a phone call or [facility] [employer] In[facility] message from this provider regarding the results of the tests along with any changes with medication or treatment plan as appropriate.  Tessalon PRN prescribed. Discussed proper administration and dosing as well as associated adverse/side effects of prescribed  medications.  Advised patient to continue OTC pharmacologic and nonpharmacologic treatment of her symptoms, including but not limited to Tylenol, Motrin, OTC cough and cold medication, and rest with plenty of fluids.  Patient is exhibiting systemic symptoms, including slightly elevated heart rate on physical exam. Symptoms are likely contributed due to URI.  Patient agreeable to this plan of care.  All questions answered.  Return to clinic if symptoms persist or worsen.  Red flag symptoms warranting emergency medical services discussed, including but not limited to chest pain, SOB, wheezing, difficulty breathing or swallowing, sensation of throat closing or mass in the throat, severe intractable headache, changes to vision or hearing, palpitations or racing heart rate, abdominal pain, fever greater than 103F despite medication management, dizziness/lightheadedness/vertigo, or nausea/vomiting/diarrhea.           Differential diagnosis, natural history, supportive care, and indications for immediate follow-up discussed.    Advised the patient to follow-up with the primary care physician for recheck, reevaluation, and consideration of further management.    Instructed patient to seek emergency medical attention via calling EMS or going to the Emergency Room for red flag symptoms, including but not limited to: chest pain, palpitations, fever greater than 103F, shortness of breath, wheezing, new or worsened numbness/tingling, focal or unilateral weakness, and bloody vomit/stool.     Please note that this dictation was created using voice recognition software. I have made a reasonable attempt to correct obvious errors, but I expect that there are errors of grammar and possibly content that I did not discover before finalizing the note.    This note was electronically signed by VICENTA Bejarano           [1]   Current Outpatient Medications:     benzonatate (TESSALON) 100 MG Cap, Take 1 Capsule by mouth 3 times a  day as needed for Cough., Disp: 60 Capsule, Rfl: 0    diclofenac sodium (VOLTAREN) 1 % Gel, Apply 4 g topically 4 times a day as needed (joint pain)., Disp: 300 g, Rfl: 3    chlorthalidone (HYGROTON) 25 MG Tab, Take 1 Tablet by mouth every day., Disp: 90 Tablet, Rfl: 3    Calcium-Vitamin D (CALTRATE 600 PLUS-VIT D PO), Take  by mouth., Disp: , Rfl:     magnesium oxide (MAG-OX) 400 MG Tab tablet, Take 400 mg by mouth every day., Disp: , Rfl:     levothyroxine (SYNTHROID) 50 MCG Tab, TAKE 1 TABLET BY MOUTH IN THE  MORNING ON AN EMPTY STOMACH, Disp: 100 Tablet, Rfl: 3    simvastatin (ZOCOR) 20 MG Tab, TAKE 1 TABLET BY MOUTH IN THE  EVENING, Disp: 100 Tablet, Rfl: 3    denosumab (PROLIA) 60 MG/ML Solution Prefilled Syringe injection, Inject 60 mg under the skin every 6 months. Indications: Osteoporosis, Disp: , Rfl:     Cyanocobalamin (VITAMIN B-12 PO), Take  by mouth., Disp: , Rfl:     gabapentin (NEURONTIN) 100 MG Cap, , Disp: , Rfl:     vitamin E 180 mg (400 units) Cap, Take 180 mg by mouth every day., Disp: , Rfl:     aspirin (ASA) 81 MG Chew Tab chewable tablet, Take 81 mg by mouth every day., Disp: , Rfl:     Multiple Vitamin (MULTI-VITAMIN DAILY PO), Take  by mouth., Disp: , Rfl:   [2]   Allergies  Allergen Reactions    Fosamax [Alendronate Sodium]      Acid reflux    [3]   Past Surgical History:  Procedure Laterality Date    MASTECTOMY Right 1/26/2016    Procedure: MASTECTOMY partial  ;  Surgeon: Rosy Parry M.D.;  Location: SURGERY SAME DAY [facility]S;  Service:     NODE BIOPSY SENTINEL Right 1/26/2016    Procedure: NODE BIOPSY SENTINEL Lymph  ;  Surgeon: Rosy Parry M.D.;  Location: SURGERY SAME DAY [facility] ORS;  Service:     OOPHORECTOMY  04/18/1977    cyst    ABDOMINAL HYSTERECTOMY TOTAL  11/05/1972    non-cancer    TONSILLECTOMY  1946    OTHER  2009, 2010    cataract IOLI genna    US-NEEDLE CORE BX-BREAST PANEL

## 2025-06-25 ENCOUNTER — OFFICE VISIT (OUTPATIENT)
Dept: MEDICAL GROUP | Facility: PHYSICIAN GROUP | Age: OVER 89
End: 2025-06-25
Payer: MEDICARE

## 2025-06-25 VITALS
DIASTOLIC BLOOD PRESSURE: 70 MMHG | WEIGHT: 160 LBS | HEART RATE: 91 BPM | HEIGHT: 62 IN | TEMPERATURE: 97.9 F | SYSTOLIC BLOOD PRESSURE: 136 MMHG | RESPIRATION RATE: 12 BRPM | OXYGEN SATURATION: 93 % | BODY MASS INDEX: 29.44 KG/M2

## 2025-06-25 DIAGNOSIS — E78.2 MIXED HYPERLIPIDEMIA: ICD-10-CM

## 2025-06-25 DIAGNOSIS — I10 ESSENTIAL HYPERTENSION: ICD-10-CM

## 2025-06-25 DIAGNOSIS — G89.29 CHRONIC LEFT-SIDED LOW BACK PAIN WITH LEFT-SIDED SCIATICA: ICD-10-CM

## 2025-06-25 DIAGNOSIS — Z13.0 SCREENING FOR DEFICIENCY ANEMIA: ICD-10-CM

## 2025-06-25 DIAGNOSIS — Z86.39 HISTORY OF VITAMIN D DEFICIENCY: ICD-10-CM

## 2025-06-25 DIAGNOSIS — M54.42 CHRONIC LEFT-SIDED LOW BACK PAIN WITH LEFT-SIDED SCIATICA: ICD-10-CM

## 2025-06-25 DIAGNOSIS — U07.1 COVID-19: Primary | ICD-10-CM

## 2025-06-25 DIAGNOSIS — Z13.1 SCREENING FOR DIABETES MELLITUS (DM): ICD-10-CM

## 2025-06-25 DIAGNOSIS — E03.9 HYPOTHYROIDISM, UNSPECIFIED TYPE: ICD-10-CM

## 2025-06-25 PROCEDURE — 3078F DIAST BP <80 MM HG: CPT

## 2025-06-25 PROCEDURE — 99214 OFFICE O/P EST MOD 30 MIN: CPT

## 2025-06-25 PROCEDURE — 3075F SYST BP GE 130 - 139MM HG: CPT

## 2025-06-25 ASSESSMENT — ENCOUNTER SYMPTOMS
BLURRED VISION: 0
BACK PAIN: 1
INSOMNIA: 0
FEVER: 0
PALPITATIONS: 0
COUGH: 1
CONSTIPATION: 0
HEARTBURN: 0
VOMITING: 0
DIZZINESS: 0
NAUSEA: 0
ABDOMINAL PAIN: 0
HEADACHES: 0
CHILLS: 0
DOUBLE VISION: 0
DEPRESSION: 0
SHORTNESS OF BREATH: 0
NERVOUS/ANXIOUS: 0
SPUTUM PRODUCTION: 0
DIARRHEA: 0

## 2025-06-25 ASSESSMENT — FIBROSIS 4 INDEX: FIB4 SCORE: 0.8

## 2025-06-25 NOTE — PROGRESS NOTES
"      Subjective:     CC: The primary encounter diagnosis was COVID-19. Diagnoses of Essential hypertension, Mixed hyperlipidemia, Hypothyroidism, unspecified type, BMI 31.0-31.9,adult, History of vitamin D deficiency, Chronic left-sided low back pain with left-sided sciatica, Screening for deficiency anemia, and Screening for diabetes mellitus (DM) were also pertinent to this visit.    HPI:   Corrinne presents today for a follow up. She was diagnosed with Covid-19 after international travel on 6/18/2025. She reports improvement with her symptoms after paxlovid treatment. Denies SOB, CP. Cough is improving, continues to have some fatigue.     Current Medications[1]    ROS:  Review of Systems   Constitutional:  Positive for malaise/fatigue. Negative for chills and fever.        Tested positive for Covid -19 on 6/18/2025   HENT:  Negative for ear pain.    Eyes:  Negative for blurred vision and double vision.   Respiratory:  Positive for cough. Negative for sputum production and shortness of breath.         Improving, non productive   Cardiovascular:  Negative for chest pain, palpitations and leg swelling.   Gastrointestinal:  Negative for abdominal pain, constipation, diarrhea, heartburn, nausea and vomiting.   Genitourinary:  Negative for dysuria, frequency, hematuria and urgency.   Musculoskeletal:  Positive for back pain.        Chronic back pain  Followed by pain managment   Skin: Negative.    Neurological:  Negative for dizziness and headaches.   Psychiatric/Behavioral:  Negative for depression. The patient is not nervous/anxious and does not have insomnia.        Objective:     Exam:  /70 (BP Location: Left arm, Patient Position: Sitting, BP Cuff Size: Adult)   Pulse 91   Temp 36.6 °C (97.9 °F) (Temporal)   Resp 12   Ht 1.575 m (5' 2\")   Wt 72.6 kg (160 lb)   SpO2 93%   BMI 29.26 kg/m²  Body mass index is 29.26 kg/m².    Physical Exam  Constitutional:       Appearance: She is obese.   HENT:      " Head: Normocephalic.      Right Ear: Tympanic membrane normal.      Left Ear: Tympanic membrane normal.      Mouth/Throat:      Mouth: Mucous membranes are moist.      Pharynx: Oropharynx is clear.   Eyes:      Conjunctiva/sclera: Conjunctivae normal.      Pupils: Pupils are equal, round, and reactive to light.   Cardiovascular:      Rate and Rhythm: Normal rate and regular rhythm.      Pulses: Normal pulses.      Heart sounds: Normal heart sounds.   Pulmonary:      Effort: Pulmonary effort is normal.   Abdominal:      General: Abdomen is flat.   Musculoskeletal:         General: Normal range of motion.   Skin:     General: Skin is warm and dry.      Capillary Refill: Capillary refill takes less than 2 seconds.   Neurological:      General: No focal deficit present.      Mental Status: She is alert and oriented to person, place, and time.   Psychiatric:         Mood and Affect: Mood normal.       Labs:    Latest Reference Range & Units Most Recent   RSV, PCR Negative, Invalid  Negative  6/18/25 15:05   SARS-CoV-2 by PCR Negative, Invalid  Positive !  6/18/25 15:05   !: Data is abnormal      Assessment & Plan:     89 y.o. female with the following -     Problem List Items Addressed This Visit          Family Medicine Problems    Essential hypertension    Chronic, stable. Bp in clinic 136/70. Continue chlorthalidone 25 mg daily   Relevant Orders    Comp Metabolic Panel    Chronic left-sided low back pain with left-sided sciatica  Chronic, ongoing. Has seen pain specialist for this, steroid injections periodically for pain relief. Also has pain relief with neurontin 100 mg 1-3 times daily as needed. Continue this.          Other    Hyperlipidemia    Relevant Orders    Lipid Profile    HEMOGLOBIN A1C    Hypothyroidism    Relevant Orders    TSH    HEMOGLOBIN A1C    BMI 31.0-31.9,adult    Relevant Orders    Comp Metabolic Panel    Lipid Profile    HEMOGLOBIN A1C    History of vitamin D deficiency    Relevant Orders     VITAMIN D,25 HYDROXY (DEFICIENCY)     Other Visit Diagnoses         COVID-19    -  Primary    Acute, improving. Reports improvement in cough and fatigue. No fevers. Discussed resting, when to wear a mask, viral load, home covid tests, and when she can return to her normal activities. Discussed when to seek medical attention, if needed.  Relevant Orders    CBC WITH DIFFERENTIAL    Comp Metabolic Panel            Patient was educated in proper administration of medication(s) ordered today including safety, possible SE, risks, benefits, rationale and alternatives to therapy.   Supportive care, differential diagnoses, and indications for immediate follow-up discussed with patient.    Pathogenesis of diagnosis discussed including typical length and natural progression.    Instructed to return to clinic or nearest emergency department for any change in condition, further concerns, or worsening of symptoms.  Patient states understanding of the plan of care and discharge instructions.    Return in about 6 months (around 12/25/2025), or if symptoms worsen or fail to improve.    Please note that this dictation was created using voice recognition software. I have made every reasonable attempt to correct obvious errors, but I expect that there are errors of grammar and possibly content that I did not discover before finalizing the note.              [1]   Current Outpatient Medications   Medication Sig Dispense Refill    benzonatate (TESSALON) 100 MG Cap Take 1 Capsule by mouth 3 times a day as needed for Cough. 60 Capsule 0    diclofenac sodium (VOLTAREN) 1 % Gel Apply 4 g topically 4 times a day as needed (joint pain). 300 g 3    chlorthalidone (HYGROTON) 25 MG Tab Take 1 Tablet by mouth every day. 90 Tablet 3    Calcium-Vitamin D (CALTRATE 600 PLUS-VIT D PO) Take  by mouth.      magnesium oxide (MAG-OX) 400 MG Tab tablet Take 400 mg by mouth every day.      levothyroxine (SYNTHROID) 50 MCG Tab TAKE 1 TABLET BY MOUTH IN THE   MORNING ON AN EMPTY STOMACH 100 Tablet 3    simvastatin (ZOCOR) 20 MG Tab TAKE 1 TABLET BY MOUTH IN THE  EVENING 100 Tablet 3    denosumab (PROLIA) 60 MG/ML Solution Prefilled Syringe injection Inject 60 mg under the skin every 6 months. Indications: Osteoporosis      Cyanocobalamin (VITAMIN B-12 PO) Take  by mouth.      gabapentin (NEURONTIN) 100 MG Cap       vitamin E 180 mg (400 units) Cap Take 180 mg by mouth every day.      aspirin (ASA) 81 MG Chew Tab chewable tablet Take 81 mg by mouth every day.      Multiple Vitamin (MULTI-VITAMIN DAILY PO) Take  by mouth.      Nirmatrelvir&Ritonavir 300/100 20 x 150 MG & 10 x 100MG Tablet Therapy Pack Take 300 mg nirmatrelvir (two 150 mg tablets) with 100 mg ritonavir (one 100 mg tablet) by mouth, with all three tablets taken together twice daily for 5 days. (Patient not taking: Reported on 6/25/2025) 30 Each 0     No current facility-administered medications for this visit.

## 2025-06-28 ENCOUNTER — APPOINTMENT (OUTPATIENT)
Dept: URGENT CARE | Facility: PHYSICIAN GROUP | Age: OVER 89
End: 2025-06-28
Payer: MEDICARE